# Patient Record
Sex: MALE | Race: BLACK OR AFRICAN AMERICAN
[De-identification: names, ages, dates, MRNs, and addresses within clinical notes are randomized per-mention and may not be internally consistent; named-entity substitution may affect disease eponyms.]

---

## 2019-07-13 ENCOUNTER — HOSPITAL ENCOUNTER (INPATIENT)
Dept: HOSPITAL 72 - EMR | Age: 59
LOS: 17 days | Discharge: NURSING, REHAB OR LONG-TERM CARE | DRG: 720 | End: 2019-07-30
Payer: MEDICAID

## 2019-07-13 VITALS — SYSTOLIC BLOOD PRESSURE: 122 MMHG | DIASTOLIC BLOOD PRESSURE: 70 MMHG

## 2019-07-13 VITALS — BODY MASS INDEX: 20.54 KG/M2 | HEIGHT: 73 IN | WEIGHT: 155.01 LBS

## 2019-07-13 DIAGNOSIS — E87.6: ICD-10-CM

## 2019-07-13 DIAGNOSIS — E11.622: ICD-10-CM

## 2019-07-13 DIAGNOSIS — Z59.0: ICD-10-CM

## 2019-07-13 DIAGNOSIS — F15.10: ICD-10-CM

## 2019-07-13 DIAGNOSIS — B19.20: ICD-10-CM

## 2019-07-13 DIAGNOSIS — E87.1: ICD-10-CM

## 2019-07-13 DIAGNOSIS — E88.09: ICD-10-CM

## 2019-07-13 DIAGNOSIS — A41.9: Primary | ICD-10-CM

## 2019-07-13 DIAGNOSIS — L97.829: ICD-10-CM

## 2019-07-13 DIAGNOSIS — L03.116: ICD-10-CM

## 2019-07-13 DIAGNOSIS — E11.610: ICD-10-CM

## 2019-07-13 DIAGNOSIS — D64.9: ICD-10-CM

## 2019-07-13 LAB
ADD MANUAL DIFF: YES
ALBUMIN SERPL-MCNC: 3 G/DL (ref 3.4–5)
ALBUMIN/GLOB SERPL: 0.7 {RATIO} (ref 1–2.7)
ALP SERPL-CCNC: 92 U/L (ref 46–116)
ALT SERPL-CCNC: 16 U/L (ref 12–78)
ANION GAP SERPL CALC-SCNC: 11 MMOL/L (ref 5–15)
APTT BLD: 36 SEC (ref 23–33)
AST SERPL-CCNC: 22 U/L (ref 15–37)
BILIRUB SERPL-MCNC: 0.9 MG/DL (ref 0.2–1)
BUN SERPL-MCNC: 17 MG/DL (ref 7–18)
CALCIUM SERPL-MCNC: 9.1 MG/DL (ref 8.5–10.1)
CHLORIDE SERPL-SCNC: 93 MMOL/L (ref 98–107)
CO2 SERPL-SCNC: 27 MMOL/L (ref 21–32)
CREAT SERPL-MCNC: 1 MG/DL (ref 0.55–1.3)
ERYTHROCYTE [DISTWIDTH] IN BLOOD BY AUTOMATED COUNT: 11.4 % (ref 11.6–14.8)
GLOBULIN SER-MCNC: 4.3 G/DL
HCT VFR BLD CALC: 36.6 % (ref 42–52)
HGB BLD-MCNC: 12.3 G/DL (ref 14.2–18)
INR PPP: 1 (ref 0.9–1.1)
MCV RBC AUTO: 90 FL (ref 80–99)
PLATELET # BLD: 191 K/UL (ref 150–450)
POTASSIUM SERPL-SCNC: 3.1 MMOL/L (ref 3.5–5.1)
RBC # BLD AUTO: 4.07 M/UL (ref 4.7–6.1)
SODIUM SERPL-SCNC: 131 MMOL/L (ref 136–145)
WBC # BLD AUTO: 18.8 K/UL (ref 4.8–10.8)

## 2019-07-13 PROCEDURE — 82728 ASSAY OF FERRITIN: CPT

## 2019-07-13 PROCEDURE — 82607 VITAMIN B-12: CPT

## 2019-07-13 PROCEDURE — 87070 CULTURE OTHR SPECIMN AEROBIC: CPT

## 2019-07-13 PROCEDURE — 85610 PROTHROMBIN TIME: CPT

## 2019-07-13 PROCEDURE — 80307 DRUG TEST PRSMV CHEM ANLYZR: CPT

## 2019-07-13 PROCEDURE — 82746 ASSAY OF FOLIC ACID SERUM: CPT

## 2019-07-13 PROCEDURE — 80202 ASSAY OF VANCOMYCIN: CPT

## 2019-07-13 PROCEDURE — 83880 ASSAY OF NATRIURETIC PEPTIDE: CPT

## 2019-07-13 PROCEDURE — 81001 URINALYSIS AUTO W/SCOPE: CPT

## 2019-07-13 PROCEDURE — 87081 CULTURE SCREEN ONLY: CPT

## 2019-07-13 PROCEDURE — 83540 ASSAY OF IRON: CPT

## 2019-07-13 PROCEDURE — 96375 TX/PRO/DX INJ NEW DRUG ADDON: CPT

## 2019-07-13 PROCEDURE — 96361 HYDRATE IV INFUSION ADD-ON: CPT

## 2019-07-13 PROCEDURE — 86709 HEPATITIS A IGM ANTIBODY: CPT

## 2019-07-13 PROCEDURE — 84443 ASSAY THYROID STIM HORMONE: CPT

## 2019-07-13 PROCEDURE — 82140 ASSAY OF AMMONIA: CPT

## 2019-07-13 PROCEDURE — 86803 HEPATITIS C AB TEST: CPT

## 2019-07-13 PROCEDURE — 93005 ELECTROCARDIOGRAM TRACING: CPT

## 2019-07-13 PROCEDURE — 82962 GLUCOSE BLOOD TEST: CPT

## 2019-07-13 PROCEDURE — 86705 HEP B CORE ANTIBODY IGM: CPT

## 2019-07-13 PROCEDURE — 99285 EMERGENCY DEPT VISIT HI MDM: CPT

## 2019-07-13 PROCEDURE — 83690 ASSAY OF LIPASE: CPT

## 2019-07-13 PROCEDURE — 36415 COLL VENOUS BLD VENIPUNCTURE: CPT

## 2019-07-13 PROCEDURE — 85025 COMPLETE CBC W/AUTO DIFF WBC: CPT

## 2019-07-13 PROCEDURE — 83550 IRON BINDING TEST: CPT

## 2019-07-13 PROCEDURE — 86850 RBC ANTIBODY SCREEN: CPT

## 2019-07-13 PROCEDURE — 93306 TTE W/DOPPLER COMPLETE: CPT

## 2019-07-13 PROCEDURE — 83735 ASSAY OF MAGNESIUM: CPT

## 2019-07-13 PROCEDURE — 80061 LIPID PANEL: CPT

## 2019-07-13 PROCEDURE — 87040 BLOOD CULTURE FOR BACTERIA: CPT

## 2019-07-13 PROCEDURE — 80048 BASIC METABOLIC PNL TOTAL CA: CPT

## 2019-07-13 PROCEDURE — 83036 HEMOGLOBIN GLYCOSYLATED A1C: CPT

## 2019-07-13 PROCEDURE — 86703 HIV-1/HIV-2 1 RESULT ANTBDY: CPT

## 2019-07-13 PROCEDURE — 85730 THROMBOPLASTIN TIME PARTIAL: CPT

## 2019-07-13 PROCEDURE — 87205 SMEAR GRAM STAIN: CPT

## 2019-07-13 PROCEDURE — 94664 DEMO&/EVAL PT USE INHALER: CPT

## 2019-07-13 PROCEDURE — 82977 ASSAY OF GGT: CPT

## 2019-07-13 PROCEDURE — 96366 THER/PROPH/DIAG IV INF ADDON: CPT

## 2019-07-13 PROCEDURE — 86140 C-REACTIVE PROTEIN: CPT

## 2019-07-13 PROCEDURE — 86901 BLOOD TYPING SEROLOGIC RH(D): CPT

## 2019-07-13 PROCEDURE — 93970 EXTREMITY STUDY: CPT

## 2019-07-13 PROCEDURE — 84550 ASSAY OF BLOOD/URIC ACID: CPT

## 2019-07-13 PROCEDURE — 83605 ASSAY OF LACTIC ACID: CPT

## 2019-07-13 PROCEDURE — 80053 COMPREHEN METABOLIC PANEL: CPT

## 2019-07-13 PROCEDURE — 87340 HEPATITIS B SURFACE AG IA: CPT

## 2019-07-13 PROCEDURE — 84100 ASSAY OF PHOSPHORUS: CPT

## 2019-07-13 PROCEDURE — 85651 RBC SED RATE NONAUTOMATED: CPT

## 2019-07-13 PROCEDURE — 85007 BL SMEAR W/DIFF WBC COUNT: CPT

## 2019-07-13 PROCEDURE — 86900 BLOOD TYPING SEROLOGIC ABO: CPT

## 2019-07-13 PROCEDURE — 80076 HEPATIC FUNCTION PANEL: CPT

## 2019-07-13 PROCEDURE — 87181 SC STD AGAR DILUTION PER AGT: CPT

## 2019-07-13 PROCEDURE — 96367 TX/PROPH/DG ADDL SEQ IV INF: CPT

## 2019-07-13 PROCEDURE — 96365 THER/PROPH/DIAG IV INF INIT: CPT

## 2019-07-13 SDOH — ECONOMIC STABILITY - HOUSING INSECURITY: HOMELESSNESS: Z59.0

## 2019-07-13 NOTE — NUR
ED Nurse Note:

Patient presents with complaints of left foot pain and swelling. Edema of the 
left foot apparent, patient reports feeling like he had recent fever. States 
leg/foot has been swollen for a month. Patient was BIBA from the streets, has 
no place to stay.
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
## 2019-07-14 VITALS — SYSTOLIC BLOOD PRESSURE: 107 MMHG | DIASTOLIC BLOOD PRESSURE: 70 MMHG

## 2019-07-14 VITALS — DIASTOLIC BLOOD PRESSURE: 76 MMHG | SYSTOLIC BLOOD PRESSURE: 129 MMHG

## 2019-07-14 VITALS — DIASTOLIC BLOOD PRESSURE: 73 MMHG | SYSTOLIC BLOOD PRESSURE: 126 MMHG

## 2019-07-14 VITALS — SYSTOLIC BLOOD PRESSURE: 102 MMHG | DIASTOLIC BLOOD PRESSURE: 65 MMHG

## 2019-07-14 VITALS — SYSTOLIC BLOOD PRESSURE: 101 MMHG | DIASTOLIC BLOOD PRESSURE: 69 MMHG

## 2019-07-14 VITALS — DIASTOLIC BLOOD PRESSURE: 70 MMHG | SYSTOLIC BLOOD PRESSURE: 109 MMHG

## 2019-07-14 LAB
APPEARANCE UR: CLEAR
APTT PPP: YELLOW S
GLUCOSE UR STRIP-MCNC: NEGATIVE MG/DL
KETONES UR QL STRIP: NEGATIVE
LEUKOCYTE ESTERASE UR QL STRIP: NEGATIVE
NITRITE UR QL STRIP: NEGATIVE
PH UR STRIP: 7 [PH] (ref 4.5–8)
PROT UR QL STRIP: (no result)
SP GR UR STRIP: 1.01 (ref 1–1.03)
UROBILINOGEN UR-MCNC: 12 MG/DL (ref 0–1)

## 2019-07-14 RX ADMIN — MORPHINE SULFATE PRN MG: 2 INJECTION, SOLUTION INTRAMUSCULAR; INTRAVENOUS at 09:38

## 2019-07-14 RX ADMIN — MORPHINE SULFATE PRN MG: 2 INJECTION, SOLUTION INTRAMUSCULAR; INTRAVENOUS at 13:46

## 2019-07-14 RX ADMIN — MORPHINE SULFATE PRN MG: 2 INJECTION, SOLUTION INTRAMUSCULAR; INTRAVENOUS at 17:37

## 2019-07-14 RX ADMIN — SODIUM CHLORIDE SCH MLS/HR: 0.9 INJECTION INTRAVENOUS at 09:39

## 2019-07-14 RX ADMIN — SODIUM CHLORIDE SCH MLS/HR: 9 INJECTION, SOLUTION INTRAVENOUS at 20:48

## 2019-07-14 RX ADMIN — HEPARIN SODIUM SCH UNITS: 5000 INJECTION INTRAVENOUS; SUBCUTANEOUS at 09:39

## 2019-07-14 RX ADMIN — HEPARIN SODIUM SCH UNITS: 5000 INJECTION INTRAVENOUS; SUBCUTANEOUS at 20:57

## 2019-07-14 RX ADMIN — DOCUSATE SODIUM SCH MG: 100 CAPSULE, LIQUID FILLED ORAL at 17:36

## 2019-07-14 RX ADMIN — MORPHINE SULFATE PRN MG: 2 INJECTION, SOLUTION INTRAMUSCULAR; INTRAVENOUS at 22:11

## 2019-07-14 NOTE — NUR
NURSE NOTES:

RECEIVED PATIENT FROM ER VIA GURNEY. ADM DX CELLULITES OF L FOOT, UNDER PRIMARY DR JIANG. 
A/O X3, ON RA, NO SOB, NO ACUTE DISTRESS. BELONGINGS CHECKED. ORIENTED PATIENT TO 
SURROUNDINGS. RAC SALINE LOCK IV INTACT, PATENT. BED IN LOWEST POSITION, LOCKED, ALARMS ON. 
CALL LIGHT IN REACH. LEFT MSG TO DR JIANG AND DR ARAMBULA FOR ADM ORDER.

## 2019-07-14 NOTE — DIAGNOSTIC IMAGING REPORT
Indication: Foot pain

 

Comparison: None

 

Findings: 3  views of the left foot were obtained. 

 

There is no acute fracture identified. There is truncation of the distal phalangeal

tuft of the hallux which may be postsurgical defect. There is generalized soft tissue

swelling present. There is loss of the plantar arch. There is slight diastases of the

talonavicular joint. There is arthrosis of the first MTP joint.

 

IMPRESSION:

 

No acute injury identified. Soft tissue swelling.

 

Multiple incidental findings as described

## 2019-07-14 NOTE — NUR
NURSE NOTES:

Dr. Weston evaluated pt, notified MD regarding WBC 18.8 and Dr. Coe ordered Cefepime 
and Vanco IV. Dr. Weston ordered MRI of foot, ankle, and leg, WCx and Wound care. Orders 
entered and carried out.



WC: NS and Xeroform



RN performed WCx and WC according to order.

## 2019-07-14 NOTE — NUR
NURSE NOTES:

Received report from IMER Busby. Pt in bed, awake, talkative, no admission orders for pt, 
physicians have been called multiple times for orders, RN will contact to follow up. Pt 
states has pain in left leg, discussed plan of care for pt and need for admission orders. Pt 
is not in respiratory distress, bed in lowest position, call light within reach.

## 2019-07-14 NOTE — HISTORY AND PHYSICAL REPORT
DATE OF ADMISSION:  07/13/2019

TIME SEEN:  At 9 a.m.



CONSULTANTS:

1. Pia Coe M.D.

2. Magdiel Randolph D.P.M.

3. Peterson King M.D.



CHIEF COMPLAINT:  Left foot cellulitis, sepsis, leukocytosis.



BRIEF HISTORY:  This is a 59-year-old male, who lives at home, presents

with three days of increased left foot cellulitis and he had a wound on

the dorsum of his left foot about three days ago and it got worse and

redness, swelling occurred.  The patient came to [place], diagnosed with

the above, and leukocytosis of 18, admitted to medical floor for further

treatment.  Currently, calm in bed, slight general pain.  No complaint

otherwise.



REVIEW OF SYSTEMS:  No chest pain.  No shortness of breath.  No nausea,

vomiting, or diarrhea.



PAST MEDICAL HISTORY:  Nothing.



PAST SURGICAL HISTORY:  Left hip.



MEDICATIONS:  Include vancomycin, cefepime, IV fluid, nitroglycerin,

temazepam, Zofran, morphine, albuterol, Tylenol, and clindamycin.



ALLERGIES:  Denies.



SOCIAL HISTORY:  Positive smoking.  Positive alcohol.  No intravenous drug

abuse.



FAMILY HISTORY:  Noncontributory.



PHYSICAL EXAMINATION:

GENERAL:  Calm in bed, oriented x3, no acute distress.

VITAL SIGNS:  Temperature is 99, pulse 95, respirations 18, blood pressure

102/65.

CARDIOVASCULAR:  No murmur.

LUNGS:  Distant and clear.

ABDOMEN:  Bowel sounds positive.  Soft, nontender, nondistended.

EXTREMITIES:  No cyanosis, clubbing.  A 1+ edema left foot dorsum, slight

_____ calf swelling up to mid shin, redness noted, slightly warm.

NEUROLOGIC:  The patient moves all extremities, slightly weak.



LABORATORY AND DIAGNOSTIC DATA:  White count 18, hemoglobin and hematocrit

12/36, platelets 191.  Sodium 131, potassium 3.1, chloride 93, glucose

128.  Lactic acid 2.9.  Albumin 3.0.  INR is 1.0, PTT is 36.



ASSESSMENT:  Left foot cellulitis, edema, leukocytosis, anemia,

malnutrition, hyponatremia, and hypokalemia.



PLAN:

1. Wound care.

2. Antibiotic per Infectious Disease.

3. Blood pressure and pain control.

4. Dietary followup.

5. Nephrology evaluation.

6. Podiatry evaluation.

7. CBC and BMP in the morning.









  ______________________________________________

  Freddy Muñiz D.O.





DR:  IDANIA

D:  07/14/2019 09:40

T:  07/15/2019 01:41

JOB#:  [id number]

CC:
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
## 2019-07-14 NOTE — NUR
NURSE NOTES:

Received report from IMER Joel. Patient alert, awake, and verbally responsive to let his 
needs known. Patient is breathing unlabored and evenly without signs of distress, 
discomfort, or SOB noted at this time. Patient verbalized mild pain on the left calf and 
food after the pain medication given on the previous shift at 1737. Patient was provided 
non-pharmacological measures such as rest, repositioning, elevating left leg, and 
distraction to alleviate the pain at this time. Patient's swollen left leg noted and left 
leg cellulitis noted. Patient's 20g IV on the RAC noted running fluid as ordered. Bed is 
placed at the lowest with brakes on and side rails up x 2 for safety measures. Call light 
placed within reach. Will continue to monitor for pain management and provide care as 
ordered.

## 2019-07-14 NOTE — NUR
NURSE NOTES:

Notified Dr. Carolin Munoz 131, K3.1, Cl 93, CRP 61.1, Lactic acid 2.50. asked about replacing 
K
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
## 2019-07-15 VITALS — DIASTOLIC BLOOD PRESSURE: 72 MMHG | SYSTOLIC BLOOD PRESSURE: 109 MMHG

## 2019-07-15 VITALS — DIASTOLIC BLOOD PRESSURE: 75 MMHG | SYSTOLIC BLOOD PRESSURE: 112 MMHG

## 2019-07-15 VITALS — SYSTOLIC BLOOD PRESSURE: 117 MMHG | DIASTOLIC BLOOD PRESSURE: 71 MMHG

## 2019-07-15 VITALS — SYSTOLIC BLOOD PRESSURE: 104 MMHG | DIASTOLIC BLOOD PRESSURE: 67 MMHG

## 2019-07-15 VITALS — SYSTOLIC BLOOD PRESSURE: 112 MMHG | DIASTOLIC BLOOD PRESSURE: 74 MMHG

## 2019-07-15 VITALS — SYSTOLIC BLOOD PRESSURE: 130 MMHG | DIASTOLIC BLOOD PRESSURE: 77 MMHG

## 2019-07-15 LAB
% IRON SATURATION: 19 % (ref 15–50)
ADD MANUAL DIFF: NO
ALBUMIN SERPL-MCNC: 2.2 G/DL (ref 3.4–5)
ALBUMIN/GLOB SERPL: 0.6 {RATIO} (ref 1–2.7)
ALP SERPL-CCNC: 80 U/L (ref 46–116)
ALT SERPL-CCNC: 15 U/L (ref 12–78)
AMMONIA PLAS-SCNC: 39 UMOL/L (ref 11–32)
ANION GAP SERPL CALC-SCNC: 7 MMOL/L (ref 5–15)
AST SERPL-CCNC: 23 U/L (ref 15–37)
BASOPHILS NFR BLD AUTO: 0.5 % (ref 0–2)
BILIRUB SERPL-MCNC: 0.7 MG/DL (ref 0.2–1)
BUN SERPL-MCNC: 9 MG/DL (ref 7–18)
CALCIUM SERPL-MCNC: 8.7 MG/DL (ref 8.5–10.1)
CHLORIDE SERPL-SCNC: 101 MMOL/L (ref 98–107)
CHOLEST SERPL-MCNC: 78 MG/DL (ref ?–200)
CO2 SERPL-SCNC: 28 MMOL/L (ref 21–32)
CREAT SERPL-MCNC: 0.9 MG/DL (ref 0.55–1.3)
EOSINOPHIL NFR BLD AUTO: 0.1 % (ref 0–3)
ERYTHROCYTE [DISTWIDTH] IN BLOOD BY AUTOMATED COUNT: 12.2 % (ref 11.6–14.8)
FERRITIN SERPL-MCNC: 324 NG/ML (ref 8–388)
GLOBULIN SER-MCNC: 4 G/DL
HCT VFR BLD CALC: 35.5 % (ref 42–52)
HDLC SERPL-MCNC: 12 MG/DL (ref 40–60)
HGB BLD-MCNC: 11.6 G/DL (ref 14.2–18)
IRON SERPL-MCNC: 25 UG/DL (ref 50–175)
LYMPHOCYTES NFR BLD AUTO: 12.5 % (ref 20–45)
MCV RBC AUTO: 93 FL (ref 80–99)
MONOCYTES NFR BLD AUTO: 14.3 % (ref 1–10)
NEUTROPHILS NFR BLD AUTO: 72.7 % (ref 45–75)
PHOSPHATE SERPL-MCNC: 3 MG/DL (ref 2.5–4.9)
PLATELET # BLD: 204 K/UL (ref 150–450)
POTASSIUM SERPL-SCNC: 3.6 MMOL/L (ref 3.5–5.1)
RBC # BLD AUTO: 3.84 M/UL (ref 4.7–6.1)
SODIUM SERPL-SCNC: 136 MMOL/L (ref 136–145)
TIBC SERPL-MCNC: 135 UG/DL (ref 250–450)
TRIGL SERPL-MCNC: 80 MG/DL (ref 30–150)
UNSATURATED IRON BINDING: 110 UG/DL (ref 112–346)
WBC # BLD AUTO: 12.6 K/UL (ref 4.8–10.8)

## 2019-07-15 RX ADMIN — HEPARIN SODIUM SCH UNITS: 5000 INJECTION INTRAVENOUS; SUBCUTANEOUS at 19:57

## 2019-07-15 RX ADMIN — DOCUSATE SODIUM SCH MG: 100 CAPSULE, LIQUID FILLED ORAL at 12:09

## 2019-07-15 RX ADMIN — DOCUSATE SODIUM SCH MG: 100 CAPSULE, LIQUID FILLED ORAL at 17:15

## 2019-07-15 RX ADMIN — HEPARIN SODIUM SCH UNITS: 5000 INJECTION INTRAVENOUS; SUBCUTANEOUS at 08:30

## 2019-07-15 RX ADMIN — MORPHINE SULFATE PRN MG: 2 INJECTION, SOLUTION INTRAMUSCULAR; INTRAVENOUS at 12:09

## 2019-07-15 RX ADMIN — SODIUM CHLORIDE SCH MLS/HR: 0.9 INJECTION INTRAVENOUS at 09:42

## 2019-07-15 RX ADMIN — MORPHINE SULFATE PRN MG: 2 INJECTION, SOLUTION INTRAMUSCULAR; INTRAVENOUS at 06:33

## 2019-07-15 RX ADMIN — DOCUSATE SODIUM SCH MG: 100 CAPSULE, LIQUID FILLED ORAL at 08:29

## 2019-07-15 RX ADMIN — MORPHINE SULFATE PRN MG: 2 INJECTION, SOLUTION INTRAMUSCULAR; INTRAVENOUS at 16:07

## 2019-07-15 RX ADMIN — SODIUM CHLORIDE SCH MLS/HR: 9 INJECTION, SOLUTION INTRAVENOUS at 19:56

## 2019-07-15 RX ADMIN — MORPHINE SULFATE PRN MG: 2 INJECTION, SOLUTION INTRAMUSCULAR; INTRAVENOUS at 02:16

## 2019-07-15 RX ADMIN — MORPHINE SULFATE PRN MG: 2 INJECTION, SOLUTION INTRAMUSCULAR; INTRAVENOUS at 20:13

## 2019-07-15 NOTE — NUR
Social Service Note



SW met with patient to assess for homelessness.  Patient is alert, oriented and verbally 
responsive.  Patient states he has been homeless for about two years.  Patient states he is 
able to live on the side of a friend's apartment located at [address] [place] [address] as long as he 
keeps it clean and he is quite at night.  Patient states he receives SSI of almost a $1,000 
a month.  Patient states he has poor money management and usually owe people money by the 
following month which makes housing options difficult.  Patient states he is out of money 
for the month.  Patient admits to substance abuse use, opiates and meth.  Patient states he 
only smokes meth a few times a month when he is cold and in pain.  Patient states he had a 
prescription for pain medication.   Patient last since at his clinic [facility], [address] [place] [address], [id number] [place] [id number], [phone] October 2018.  Pending MRI to rule 
out Osteo.  If patient requires IV antibiotics he will require SNF placement.  Patient is 
open to sober/independent living however states he can't commit to paying each month.  
Patient denies mental health disorder.  Patient provided his brother Kobi Terrazas 
[phone] as an emergency contact.  Will continue to monitor and assess patient needs 
upon discharge.

## 2019-07-15 NOTE — NUR
NURSE NOTES:

Pt resting in bed. Denies pain, no SOB. Dressing LLE , swelling, redness, elevated on 
pillow. call light within  reach, bed in low position, fall  precaution maintained. Will 
continue to monitor.

## 2019-07-15 NOTE — NUR
NURSE NOTES:

Patient had mild fever of 100.6 at midnight vitals. Tylenol was given at 0052 and cooling 
measures been applied such as light blanket, encouraging hydration, and ice packs. Patient's 
temperature at 0122 was 100.1. Patient's current temperature at 0258 is 98.6. Will continue 
to monitor and provide care as ordered.

## 2019-07-15 NOTE — INFECTIOUS DISEASES PROG NOTE
Assessment/Plan


Assessment/Plan





Assessment/Plan:


60 yo male who presnted to the ED on 7/12/19 with left leg cellulitis.





Left foot cellulitis


    Leukocytosis ;improving


    Low grade fevers


        -Foot xray: No acute injury identified. Soft tissue swelling. Multiple 

incidental findings as described











PLAN:





 - Continue Cefepime #2 and Vancomycin #2


 - f/u wound cx


 - Monitor CBC and Temps


- f/u MRI to R/O OM





Thank you for this consult. We will continue to follow the patient during this 

hospitalization.





Subjective


Allergies:  


Coded Allergies:  


     No Known Allergies (Unverified , 7/13/19)


Subjective


Tm 100.8


WBC improving


BCx NTD





Objective


Vital Signs





Last 24 Hour Vital Signs








  Date Time  Temp Pulse Resp B/P (MAP) Pulse Ox O2 Delivery O2 Flow Rate FiO2


 


7/15/19 16:37 98.1       


 


7/15/19 16:00 98.5 76 19 112/74 (87) 96   


 


7/15/19 12:00 98.1 64 19 109/72 (84) 97   


 


7/15/19 09:00      Room Air  


 


7/15/19 08:00 98.1 93 19 104/67 (79) 98   


 


7/15/19 07:30  83 17  93 Room Air  21


 


7/15/19 04:00 99.4 79 19 112/75 (87) 97   


 


7/15/19 01:22 100.1       


 


7/15/19 00:00 100.6 96 19 130/77 (94) 96   


 


7/14/19 21:00      Room Air  


 


7/14/19 20:00 100.8 104 19 129/76 (93) 96   








Height (Feet):  6


Height (Inches):  1.00


Weight (Pounds):  98


Objective


Gen:  NAD


HEENT: NCAT, MMM, EOMI, PERRL, No Oral lesion, no scleral icterus 


NECK:  full range of motion, supple, no meningismus, No LAD, No JVD


LUNGS:  CTAB, No W/C, No Accessory muscle use


CARDS: RRR, S1, S2, No M/R/G, 


ABD: Soft, NT, ND, No R/G, + BS, No HSM, No Masses


: Deferred


Ext: C/C/E, Pulses 2+ B/L (DP, Rad): LLE swelling with erythema and pain, 

Ulceration present with purulence.


NEURO: A/O x 4, Strength and Sensation Grossly intact


PSYCH: Normal mood and affect


SKIN:  Warm/dry, No rashes





Microbiology








 Date/Time


Source Procedure


Growth Status


 


 


 7/13/19 20:27


Blood Blood Culture - Preliminary


NO GROWTH AFTER 24 HOURS Resulted


 


 7/13/19 20:19


Blood Blood Culture - Preliminary


NO GROWTH AFTER 24 HOURS Resulted





 7/14/19 11:30


Leg Left Gram Stain - Final Resulted


 


 7/14/19 11:30


Leg Left Wound Culture


Pending Resulted


 


 7/13/19 20:25


Rectum  Received











Laboratory Tests








Test


  7/15/19


05:40


 


White Blood Count


  12.6 K/UL


(4.8-10.8)  H


 


Red Blood Count


  3.84 M/UL


(4.70-6.10)  L


 


Hemoglobin


  11.6 G/DL


(14.2-18.0)  L


 


Hematocrit


  35.5 %


(42.0-52.0)  L


 


Mean Corpuscular Volume 93 FL (80-99)  


 


Mean Corpuscular Hemoglobin


  30.3 PG


(27.0-31.0)


 


Mean Corpuscular Hemoglobin


Concent 32.7 G/DL


(32.0-36.0)


 


Red Cell Distribution Width


  12.2 %


(11.6-14.8)


 


Platelet Count


  204 K/UL


(150-450)


 


Mean Platelet Volume


  5.6 FL


(6.5-10.1)  L


 


Neutrophils (%) (Auto)


  72.7 %


(45.0-75.0)


 


Lymphocytes (%) (Auto)


  12.5 %


(20.0-45.0)  L


 


Monocytes (%) (Auto)


  14.3 %


(1.0-10.0)  H


 


Eosinophils (%) (Auto)


  0.1 %


(0.0-3.0)


 


Basophils (%) (Auto)


  0.5 %


(0.0-2.0)


 


Sodium Level


  136 MMOL/L


(136-145)


 


Potassium Level


  3.6 MMOL/L


(3.5-5.1)


 


Chloride Level


  101 MMOL/L


()


 


Carbon Dioxide Level


  28 MMOL/L


(21-32)


 


Anion Gap


  7 mmol/L


(5-15)


 


Blood Urea Nitrogen


  9 mg/dL (7-18)


 


 


Creatinine


  0.9 MG/DL


(0.55-1.30)


 


Estimat Glomerular Filtration


Rate > 60 mL/min


(>60)


 


Glucose Level


  95 MG/DL


()


 


Hemoglobin A1c


  6.4 %


(4.3-6.0)  H


 


Uric Acid


  3.5 MG/DL


(2.6-7.2)


 


Calcium Level


  8.7 MG/DL


(8.5-10.1)


 


Phosphorus Level


  3.0 MG/DL


(2.5-4.9)


 


Magnesium Level


  2.0 MG/DL


(1.8-2.4)


 


Iron Level


  25 ug/dL


()  L


 


Total Iron Binding Capacity


  135 ug/dL


(250-450)  L


 


Percent Iron Saturation 19 % (15-50)  


 


Unsaturated Iron Binding


  110 ug/dL


(112-346)  L


 


Ferritin


  324 NG/ML


(8-388)


 


Total Bilirubin


  0.7 MG/DL


(0.2-1.0)


 


Aspartate Amino Transf


(AST/SGOT) 23 U/L (15-37)


 


 


Alanine Aminotransferase


(ALT/SGPT) 15 U/L (12-78)


 


 


Alkaline Phosphatase


  80 U/L


()


 


Ammonia


  39 umol/L


(11-32)  H


 


C-Reactive Protein,


Quantitative 19.3 mg/dL


(0.00-0.90)  H


 


Pro-B-Type Natriuretic Peptide


  773 pg/mL


(0-125)  H


 


Total Protein


  6.2 G/DL


(6.4-8.2)  L


 


Albumin


  2.2 G/DL


(3.4-5.0)  L


 


Globulin 4.0 g/dL  


 


Albumin/Globulin Ratio


  0.6 (1.0-2.7)


L


 


Triglycerides Level


  80 MG/DL


()


 


Cholesterol Level


  78 MG/DL (<


200)


 


LDL Cholesterol


  47 mg/dL


(<100)


 


HDL Cholesterol


  12 MG/DL


(40-60)  L


 


Cholesterol/HDL Ratio


  6.5 (3.3-4.4)


H


 


Vitamin B12 Level


  427 PG/ML


(193-986)


 


Folate


  8.4 NG/ML


(8.6-58.9)  L


 


Thyroid Stimulating Hormone


(TSH) 0.516 uiU/mL


(0.358-3.740)











Current Medications








 Medications


  (Trade)  Dose


 Ordered  Sig/Herlinda


 Route


 PRN Reason  Start Time


 Stop Time Status Last Admin


Dose Admin


 


 Acetaminophen


  (Tylenol)  650 mg  Q4H  PRN


 ORAL


 fever  7/14/19 08:15


 8/13/19 08:14  7/15/19 00:52


 


 


 Albuterol/


 Ipratropium


  (Albuterol/


 Ipratropium)  3 ml  Q4H  PRN


 HHN


 Shortness of Breath  7/14/19 08:15


 7/19/19 08:14   


 


 


 Cefepime HCl 1 gm/


 Dextrose  55 ml @ 


 110 mls/hr  EVERY 12  HOURS


 IVPB


   7/16/19 09:00


 7/21/19 10:00   


 


 


 Dextrose


  (Dextrose 50%)  25 ml  Q30M  PRN


 IV


 Hypoglycemia  7/14/19 08:30


 8/13/19 08:16   


 


 


 Dextrose


  (Dextrose 50%)  50 ml  Q30M  PRN


 IV


 hypoglycemia  7/14/19 08:30


 8/13/19 08:29   


 


 


 Dextrose/


 Electrolytes  1,000 ml @ 


 50 mls/hr  Q20H


 IV


   7/15/19 10:00


 8/13/19 09:59  7/15/19 09:39


 


 


 Docusate Sodium


  (Colace)  100 mg  THREE TIMES A  DAY


 ORAL


   7/14/19 18:00


 8/13/19 17:59  7/15/19 12:09


 


 


 Folic Acid


  (Folate)  3 mg  DAILY


 ORAL


   7/15/19 09:15


 8/14/19 09:14  7/15/19 09:41


 


 


 Heparin Sodium


  (Porcine)


  (Heparin 5000


 units/ml)  5,000 units  EVERY 12  HOURS


 SUBQ


   7/14/19 09:00


 8/13/19 08:59  7/15/19 08:30


 


 


 Morphine Sulfate


  (Morphine


 Sulfate)  2 mg  Q4H  PRN


 IVP


 Moderate Pain (Pain Scale 4-6)  7/14/19 08:15


 7/21/19 08:14  7/15/19 16:07


 


 


 Nitroglycerin


  (Ntg)  0.4 mg  Q5M  PRN


 SL


 Prn Chest Pain  7/14/19 08:15


 8/13/19 08:14   


 


 


 Ondansetron HCl


  (Zofran)  4 mg  Q6H  PRN


 IVP


 Nausea & Vomiting  7/14/19 08:15


 8/13/19 08:14   


 


 


 Pantoprazole


  (Protonix)  40 mg  EVERY 12  HOURS


 ORAL


   7/14/19 21:00


 8/13/19 20:59  7/15/19 08:29


 


 


 Polyethylene


 Glycol


  (Miralax)  17 gm  DAILYPRN  PRN


 ORAL


 Constipation  7/14/19 08:15


 8/13/19 08:14  7/14/19 11:32


 


 


 Temazepam


  (Restoril)  15 mg  HSPRN  PRN


 ORAL


 Insomnia  7/14/19 08:15


 7/21/19 08:14   


 


 


 Vancomycin HCl


  (Vanco rx to


 dose)  1 ea  DAILY  PRN


 MISC


 .  7/14/19 08:30


 8/13/19 08:29   


 


 


 Vancomycin HCl 1


 gm/Dextrose  275 ml @ 


 183.3 mls/


 hr  Q24H


 IVPB


   7/14/19 20:00


 7/19/19 19:59  7/14/19 20:48


 

















Sarah Sinclair M.D. Jul 15, 2019 18:30
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
## 2019-07-15 NOTE — CONSULTATION
History of Present Illness


General


Date patient seen:  Jul 15, 2019


Chief Complaint:  Skin Rash/Abscess


Reason for Consultation:  cellulitis





Present Illness


HPI


59 year old male, homeless, no major medical problems presented to [place] [facility] 

with selling and redness of his left foot.  he was diagnosed to have cellulitis 

and sepsis and admitted for further management.


Allergies:  


Coded Allergies:  


     No Known Allergies (Unverified , 7/13/19)





Patient History


Healthcare decision maker





Resuscitation status


Full Code


Advanced Directive on File








Review of Systems


All Other Systems:  negative except mentioned in HPI





Physical Exam


General Appearance:  no apparent distress, alert


Lines, tubes and drains:  peripheral


HEENT:  normocephalic, atraumatic


Neck:  non-tender, normal alignment


Respiratory/Chest:  chest wall non-tender


Cardiovascular/Chest:  normal peripheral pulses, normal rate


Abdomen:  normal bowel sounds


Genitourinary/Rectal:  normal genital exam


Skin Exam:  rash





Last 24 Hour Vital Signs








  Date Time  Temp Pulse Resp B/P (MAP) Pulse Ox O2 Delivery O2 Flow Rate FiO2


 


7/15/19 12:00 98.1 64 19 109/72 (84) 97   


 


7/15/19 09:00      Room Air  


 


7/15/19 08:00 98.1 93 19 104/67 (79) 98   


 


7/15/19 07:30  83 17  93 Room Air  21


 


7/15/19 04:00 99.4 79 19 112/75 (87) 97   


 


7/15/19 01:22 100.1       


 


7/15/19 00:00 100.6 96 19 130/77 (94) 96   


 


7/14/19 21:00      Room Air  


 


7/14/19 20:00 100.8 104 19 129/76 (93) 96   


 


7/14/19 16:00 98.6  17 109/70 (83) 96   


 


7/14/19 14:16 98.2       

















Intake and Output  


 


 7/14/19 7/15/19





 18:59 06:59


 


Intake Total 1630 ml 2395.0 ml


 


Output Total 1200 ml 1300 ml


 


Balance 430 ml 1095.0 ml


 


  


 


Intake Oral 1520 ml 1520 ml


 


IV Total 110 ml 875.0 ml


 


Output Urine Total 1200 ml 1300 ml


 


# Voids 5 2











Laboratory Tests








Test


  7/14/19


15:00 7/15/19


05:40


 


Urine Color Yellow   


 


Urine Appearance Clear   


 


Urine pH 7 (4.5-8.0)   


 


Urine Specific Gravity


  1.015


(1.005-1.035) 


 


 


Urine Protein


  2+ (NEGATIVE)


H 


 


 


Urine Glucose (UA)


  Negative


(NEGATIVE) 


 


 


Urine Ketones


  Negative


(NEGATIVE) 


 


 


Urine Blood


  Negative


(NEGATIVE) 


 


 


Urine Nitrite


  Negative


(NEGATIVE) 


 


 


Urine Bilirubin


  Negative


(NEGATIVE) 


 


 


Urine Urobilinogen


  12 MG/DL


(0.0-1.0)  H 


 


 


Urine Leukocyte Esterase


  Negative


(NEGATIVE) 


 


 


Urine RBC


  0-2 /HPF (0 -


0)  H 


 


 


Urine WBC


  0-2 /HPF (0 -


0) 


 


 


Urine Squamous Epithelial


Cells None /LPF


(NONE/OCC) 


 


 


Urine Bacteria


  Occasional


/HPF (NONE) 


 


 


Urine Opiates Screen


  Positive


(NEGATIVE)  H 


 


 


Urine Barbiturates Screen


  Negative


(NEGATIVE) 


 


 


Phencyclidine (PCP) Screen


  Negative


(NEGATIVE) 


 


 


Urine Amphetamines Screen


  Positive


(NEGATIVE)  H 


 


 


Urine Benzodiazepines Screen


  Negative


(NEGATIVE) 


 


 


Urine Cocaine Screen


  Negative


(NEGATIVE) 


 


 


Urine Marijuana (THC) Screen


  Negative


(NEGATIVE) 


 


 


White Blood Count


  


  12.6 K/UL


(4.8-10.8)  H


 


Red Blood Count


  


  3.84 M/UL


(4.70-6.10)  L


 


Hemoglobin


  


  11.6 G/DL


(14.2-18.0)  L


 


Hematocrit


  


  35.5 %


(42.0-52.0)  L


 


Mean Corpuscular Volume  93 FL (80-99)  


 


Mean Corpuscular Hemoglobin


  


  30.3 PG


(27.0-31.0)


 


Mean Corpuscular Hemoglobin


Concent 


  32.7 G/DL


(32.0-36.0)


 


Red Cell Distribution Width


  


  12.2 %


(11.6-14.8)


 


Platelet Count


  


  204 K/UL


(150-450)


 


Mean Platelet Volume


  


  5.6 FL


(6.5-10.1)  L


 


Neutrophils (%) (Auto)


  


  72.7 %


(45.0-75.0)


 


Lymphocytes (%) (Auto)


  


  12.5 %


(20.0-45.0)  L


 


Monocytes (%) (Auto)


  


  14.3 %


(1.0-10.0)  H


 


Eosinophils (%) (Auto)


  


  0.1 %


(0.0-3.0)


 


Basophils (%) (Auto)


  


  0.5 %


(0.0-2.0)


 


Sodium Level


  


  136 MMOL/L


(136-145)


 


Potassium Level


  


  3.6 MMOL/L


(3.5-5.1)


 


Chloride Level


  


  101 MMOL/L


()


 


Carbon Dioxide Level


  


  28 MMOL/L


(21-32)


 


Anion Gap


  


  7 mmol/L


(5-15)


 


Blood Urea Nitrogen


  


  9 mg/dL (7-18)


 


 


Creatinine


  


  0.9 MG/DL


(0.55-1.30)


 


Estimat Glomerular Filtration


Rate 


  > 60 mL/min


(>60)


 


Glucose Level


  


  95 MG/DL


()


 


Hemoglobin A1c


  


  6.4 %


(4.3-6.0)  H


 


Uric Acid


  


  3.5 MG/DL


(2.6-7.2)


 


Calcium Level


  


  8.7 MG/DL


(8.5-10.1)


 


Phosphorus Level


  


  3.0 MG/DL


(2.5-4.9)


 


Magnesium Level


  


  2.0 MG/DL


(1.8-2.4)


 


Iron Level


  


  25 ug/dL


()  L


 


Total Iron Binding Capacity


  


  135 ug/dL


(250-450)  L


 


Percent Iron Saturation  19 % (15-50)  


 


Unsaturated Iron Binding


  


  110 ug/dL


(112-346)  L


 


Ferritin


  


  324 NG/ML


(8-388)


 


Total Bilirubin


  


  0.7 MG/DL


(0.2-1.0)


 


Aspartate Amino Transf


(AST/SGOT) 


  23 U/L (15-37)


 


 


Alanine Aminotransferase


(ALT/SGPT) 


  15 U/L (12-78)


 


 


Alkaline Phosphatase


  


  80 U/L


()


 


Ammonia


  


  39 umol/L


(11-32)  H


 


C-Reactive Protein,


Quantitative 


  19.3 mg/dL


(0.00-0.90)  H


 


Pro-B-Type Natriuretic Peptide


  


  773 pg/mL


(0-125)  H


 


Total Protein


  


  6.2 G/DL


(6.4-8.2)  L


 


Albumin


  


  2.2 G/DL


(3.4-5.0)  L


 


Globulin  4.0 g/dL  


 


Albumin/Globulin Ratio


  


  0.6 (1.0-2.7)


L


 


Triglycerides Level


  


  80 MG/DL


()


 


Cholesterol Level


  


  78 MG/DL (<


200)


 


LDL Cholesterol


  


  47 mg/dL


(<100)


 


HDL Cholesterol


  


  12 MG/DL


(40-60)  L


 


Cholesterol/HDL Ratio


  


  6.5 (3.3-4.4)


H


 


Vitamin B12 Level


  


  427 PG/ML


(193-986)


 


Folate


  


  8.4 NG/ML


(8.6-58.9)  L


 


Thyroid Stimulating Hormone


(TSH) 


  0.516 uiU/mL


(0.358-3.740)








Height (Feet):  6


Height (Inches):  1.00


Weight (Pounds):  98


Medications





Current Medications








 Medications


  (Trade)  Dose


 Ordered  Sig/Herlinda


 Route


 PRN Reason  Start Time


 Stop Time Status Last Admin


Dose Admin


 


 Acetaminophen


  (Tylenol)  650 mg  Q4H  PRN


 ORAL


 fever  7/14/19 08:15


 8/13/19 08:14  7/15/19 00:52


 


 


 Albuterol/


 Ipratropium


  (Albuterol/


 Ipratropium)  3 ml  Q4H  PRN


 HHN


 Shortness of Breath  7/14/19 08:15


 7/19/19 08:14   


 


 


 Cefepime HCl 2 gm/


 Dextrose  110 ml @ 


 220 mls/hr  Q24H


 IV


   7/14/19 10:00


 7/21/19 09:59  7/15/19 09:42


 


 


 Dextrose


  (Dextrose 50%)  25 ml  Q30M  PRN


 IV


 Hypoglycemia  7/14/19 08:30


 8/13/19 08:16   


 


 


 Dextrose


  (Dextrose 50%)  50 ml  Q30M  PRN


 IV


 hypoglycemia  7/14/19 08:30


 8/13/19 08:29   


 


 


 Dextrose/


 Electrolytes  1,000 ml @ 


 50 mls/hr  Q20H


 IV


   7/15/19 10:00


 8/13/19 09:59  7/15/19 09:39


 


 


 Docusate Sodium


  (Colace)  100 mg  THREE TIMES A  DAY


 ORAL


   7/14/19 18:00


 8/13/19 17:59  7/15/19 12:09


 


 


 Folic Acid


  (Folate)  3 mg  DAILY


 ORAL


   7/15/19 09:15


 8/14/19 09:14  7/15/19 09:41


 


 


 Heparin Sodium


  (Porcine)


  (Heparin 5000


 units/ml)  5,000 units  EVERY 12  HOURS


 SUBQ


   7/14/19 09:00


 8/13/19 08:59  7/15/19 08:30


 


 


 Morphine Sulfate


  (Morphine


 Sulfate)  2 mg  Q4H  PRN


 IVP


 Moderate Pain (Pain Scale 4-6)  7/14/19 08:15


 7/21/19 08:14  7/15/19 12:09


 


 


 Nitroglycerin


  (Ntg)  0.4 mg  Q5M  PRN


 SL


 Prn Chest Pain  7/14/19 08:15


 8/13/19 08:14   


 


 


 Ondansetron HCl


  (Zofran)  4 mg  Q6H  PRN


 IVP


 Nausea & Vomiting  7/14/19 08:15


 8/13/19 08:14   


 


 


 Pantoprazole


  (Protonix)  40 mg  EVERY 12  HOURS


 ORAL


   7/14/19 21:00


 8/13/19 20:59  7/15/19 08:29


 


 


 Polyethylene


 Glycol


  (Miralax)  17 gm  DAILYPRN  PRN


 ORAL


 Constipation  7/14/19 08:15


 8/13/19 08:14  7/14/19 11:32


 


 


 Temazepam


  (Restoril)  15 mg  HSPRN  PRN


 ORAL


 Insomnia  7/14/19 08:15


 7/21/19 08:14   


 


 


 Vancomycin HCl


  (Vanco rx to


 dose)  1 ea  DAILY  PRN


 MISC


 .  7/14/19 08:30


 8/13/19 08:29   


 


 


 Vancomycin HCl 1


 gm/Dextrose  275 ml @ 


 183.3 mls/


 hr  Q24H


 IVPB


   7/14/19 20:00


 7/19/19 19:59  7/14/19 20:48


 











Assessment/Plan


Problem List:  


(1) Sepsis


ICD Codes:  A41.9 - Sepsis, unspecified organism


SNOMED:  28602570


(2) Cellulitis of left foot


ICD Codes:  L03.116 - Cellulitis of left lower limb


SNOMED:  098504242


Assessment/Plan:


iv  abx


wound care


dvt prophylaxis


symptomatic treatment.











Pia Coe MD Jul 15, 2019 12:42

## 2019-07-15 NOTE — NEPHROLOGY PROGRESS NOTE
Assessment/Plan


Problem List:  


(1) Cellulitis of left foot


(2) Sepsis


(3) Hyponatremia


(4) Hypoalbuminemia


Assessment


Left foot cellulitis / Leukocytosis  / No fever


Low Na


Low K


Anemia


High Lactic


Low Albumin


Plan








recheck BMI


Isotonic solution


K supplement


monitor lytes


avoid nephrotoxics


urine tox screen positive for Amphetamines and Opiates


per orders





Objective


Objective





Last 24 Hour Vital Signs








  Date Time  Temp Pulse Resp B/P (MAP) Pulse Ox O2 Delivery O2 Flow Rate FiO2


 


7/15/19 12:00 98.1 64 19 109/72 (84) 97   


 


7/15/19 09:00      Room Air  


 


7/15/19 08:00 98.1 93 19 104/67 (79) 98   


 


7/15/19 07:30  83 17  93 Room Air  21


 


7/15/19 04:00 99.4 79 19 112/75 (87) 97   


 


7/15/19 01:22 100.1       


 


7/15/19 00:00 100.6 96 19 130/77 (94) 96   


 


7/14/19 21:00      Room Air  


 


7/14/19 20:00 100.8 104 19 129/76 (93) 96   


 


7/14/19 16:00 98.6  17 109/70 (83) 96   


 


7/14/19 14:16 98.2       

















Intake and Output  


 


 7/14/19 7/15/19





 18:59 06:59


 


Intake Total 1630 ml 2395.0 ml


 


Output Total 1200 ml 1300 ml


 


Balance 430 ml 1095.0 ml


 


  


 


Intake Oral 1520 ml 1520 ml


 


IV Total 110 ml 875.0 ml


 


Output Urine Total 1200 ml 1300 ml


 


# Voids 5 2








Laboratory Tests


7/14/19 15:00: 


Urine Color Yellow, Urine Appearance Clear, Urine pH 7, Urine Specific Gravity 

1.015, Urine Protein 2+H, Urine Glucose (UA) Negative, Urine Ketones Negative, 

Urine Blood Negative, Urine Nitrite Negative, Urine Bilirubin Negative, Urine 

Urobilinogen 12H, Urine Leukocyte Esterase Negative, Urine RBC 0-2H, Urine WBC 0

-2, Urine Squamous Epithelial Cells None, Urine Bacteria Occasional, Urine 

Opiates Screen PositiveH, Urine Barbiturates Screen Negative, Phencyclidine (PCP

) Screen Negative, Urine Amphetamines Screen PositiveH, Urine Benzodiazepines 

Screen Negative, Urine Cocaine Screen Negative, Urine Marijuana (THC) Screen 

Negative


7/15/19 05:40: 


White Blood Count 12.6H, Red Blood Count 3.84L, Hemoglobin 11.6L, Hematocrit 

35.5L, Mean Corpuscular Volume 93, Mean Corpuscular Hemoglobin 30.3, Mean 

Corpuscular Hemoglobin Concent 32.7, Red Cell Distribution Width 12.2, Platelet 

Count 204, Mean Platelet Volume 5.6L, Neutrophils (%) (Auto) 72.7, Lymphocytes (

%) (Auto) 12.5L, Monocytes (%) (Auto) 14.3H, Eosinophils (%) (Auto) 0.1, 

Basophils (%) (Auto) 0.5, Sodium Level 136, Potassium Level 3.6, Chloride Level 

101, Carbon Dioxide Level 28, Anion Gap 7, Blood Urea Nitrogen 9, Creatinine 0.9

, Estimat Glomerular Filtration Rate > 60, Glucose Level 95, Hemoglobin A1c 6.4H

, Uric Acid 3.5, Calcium Level 8.7, Phosphorus Level 3.0, Magnesium Level 2.0, 

Iron Level 25L, Total Iron Binding Capacity 135L, Percent Iron Saturation 19, 

Unsaturated Iron Binding 110L, Ferritin 324, Total Bilirubin 0.7, Aspartate 

Amino Transf (AST/SGOT) 23, Alanine Aminotransferase (ALT/SGPT) 15, Alkaline 

Phosphatase 80, Ammonia 39H, C-Reactive Protein, Quantitative 19.3H, Pro-B-Type 

Natriuretic Peptide 773H, Total Protein 6.2L, Albumin 2.2L, Globulin 4.0, 

Albumin/Globulin Ratio 0.6L, Triglycerides Level 80, Cholesterol Level 78, LDL 

Cholesterol 47, HDL Cholesterol 12L, Cholesterol/HDL Ratio 6.5H, Vitamin B12 

Level 427, Folate 8.4L, Thyroid Stimulating Hormone (TSH) 0.516


Height (Feet):  6


Height (Inches):  1.00


Weight (Pounds):  98











Manuel Norton MD Jul 15, 2019 12:44

## 2019-07-15 NOTE — GENERAL PROGRESS NOTE
Assessment/Plan


Problem List:  


(1) Sepsis


ICD Codes:  A41.9 - Sepsis, unspecified organism


SNOMED:  15686029


(2) Cellulitis of left foot


ICD Codes:  L03.116 - Cellulitis of left lower limb


SNOMED:  145639462


Status:  stable, progressing


Assessment/Plan:


wound care abx pain control cbc bmp am





Subjective


Constitutional:  Reports: weakness


Allergies:  


Coded Allergies:  


     No Known Allergies (Unverified , 7/13/19)


All Systems:  reviewed and negative except above


Subjective


sleepy calm





Objective





Last 24 Hour Vital Signs








  Date Time  Temp Pulse Resp B/P (MAP) Pulse Ox O2 Delivery O2 Flow Rate FiO2


 


7/15/19 09:00      Room Air  


 


7/15/19 08:00 98.1 93 19 104/67 (79) 98   


 


7/15/19 07:30  83 17  93 Room Air  21


 


7/15/19 04:00 99.4 79 19 112/75 (87) 97   


 


7/15/19 01:22 100.1       


 


7/15/19 00:00 100.6 96 19 130/77 (94) 96   


 


7/14/19 21:00      Room Air  


 


7/14/19 20:00 100.8 104 19 129/76 (93) 96   


 


7/14/19 16:00 98.6  17 109/70 (83) 96   


 


7/14/19 14:16 98.2       


 


7/14/19 12:00 98.2 93 26 101/69 (80) 98   

















Intake and Output  


 


 7/14/19 7/15/19





 19:00 07:00


 


Intake Total 1630 ml 2395.0 ml


 


Output Total 1200 ml 1300 ml


 


Balance 430 ml 1095.0 ml


 


  


 


Intake Oral 1520 ml 1520 ml


 


IV Total 110 ml 875.0 ml


 


Output Urine Total 1200 ml 1300 ml


 


# Voids 5 2








Laboratory Tests


7/14/19 15:00: 


Urine Color Yellow, Urine Appearance Clear, Urine pH 7, Urine Specific Gravity 

1.015, Urine Protein 2+H, Urine Glucose (UA) Negative, Urine Ketones Negative, 

Urine Blood Negative, Urine Nitrite Negative, Urine Bilirubin Negative, Urine 

Urobilinogen 12H, Urine Leukocyte Esterase Negative, Urine RBC 0-2H, Urine WBC 0

-2, Urine Squamous Epithelial Cells None, Urine Bacteria Occasional, Urine 

Opiates Screen PositiveH, Urine Barbiturates Screen Negative, Phencyclidine (PCP

) Screen Negative, Urine Amphetamines Screen PositiveH, Urine Benzodiazepines 

Screen Negative, Urine Cocaine Screen Negative, Urine Marijuana (THC) Screen 

Negative


7/15/19 05:40: 


White Blood Count 12.6H, Red Blood Count 3.84L, Hemoglobin 11.6L, Hematocrit 

35.5L, Mean Corpuscular Volume 93, Mean Corpuscular Hemoglobin 30.3, Mean 

Corpuscular Hemoglobin Concent 32.7, Red Cell Distribution Width 12.2, Platelet 

Count 204, Mean Platelet Volume 5.6L, Neutrophils (%) (Auto) 72.7, Lymphocytes (

%) (Auto) 12.5L, Monocytes (%) (Auto) 14.3H, Eosinophils (%) (Auto) 0.1, 

Basophils (%) (Auto) 0.5, Sodium Level 136, Potassium Level 3.6, Chloride Level 

101, Carbon Dioxide Level 28, Anion Gap 7, Blood Urea Nitrogen 9, Creatinine 0.9

, Estimat Glomerular Filtration Rate > 60, Glucose Level 95, Hemoglobin A1c 6.4H

, Uric Acid 3.5, Calcium Level 8.7, Phosphorus Level 3.0, Magnesium Level 2.0, 

Iron Level 25L, Total Iron Binding Capacity 135L, Percent Iron Saturation 19, 

Unsaturated Iron Binding 110L, Ferritin 324, Total Bilirubin 0.7, Aspartate 

Amino Transf (AST/SGOT) 23, Alanine Aminotransferase (ALT/SGPT) 15, Alkaline 

Phosphatase 80, Ammonia 39H, C-Reactive Protein, Quantitative 19.3H, Pro-B-Type 

Natriuretic Peptide 773H, Total Protein 6.2L, Albumin 2.2L, Globulin 4.0, 

Albumin/Globulin Ratio 0.6L, Triglycerides Level 80, Cholesterol Level 78, LDL 

Cholesterol 47, HDL Cholesterol 12L, Cholesterol/HDL Ratio 6.5H, Vitamin B12 

Level 427, Folate 8.4L, Thyroid Stimulating Hormone (TSH) 0.516


Height (Feet):  6


Height (Inches):  1.00


Weight (Pounds):  98


General Appearance:  lethargic


EENT:  normal ENT inspection


Neck:  normal alignment


Cardiovascular:  normal peripheral pulses, normal rate, regular rhythm


Respiratory/Chest:  chest wall non-tender, lungs clear, normal breath sounds


Abdomen:  normal bowel sounds, non tender, soft


Extremities:  normal inspection


Edema:  1+ Arm (L), 1+ Arm (R), 1+ Leg (L), 1+ Leg (R), 1+ Pedal (L), 1+ Pedal (

R), 1+ Generalized


Neurologic:  motor weakness


Skin:  normal pigmentation, warm/dry











Freddy Muñiz DO Jul 15, 2019 09:11

## 2019-07-16 VITALS — DIASTOLIC BLOOD PRESSURE: 82 MMHG | SYSTOLIC BLOOD PRESSURE: 129 MMHG

## 2019-07-16 VITALS — SYSTOLIC BLOOD PRESSURE: 129 MMHG | DIASTOLIC BLOOD PRESSURE: 75 MMHG

## 2019-07-16 VITALS — DIASTOLIC BLOOD PRESSURE: 93 MMHG | SYSTOLIC BLOOD PRESSURE: 146 MMHG

## 2019-07-16 VITALS — DIASTOLIC BLOOD PRESSURE: 82 MMHG | SYSTOLIC BLOOD PRESSURE: 141 MMHG

## 2019-07-16 VITALS — SYSTOLIC BLOOD PRESSURE: 137 MMHG | DIASTOLIC BLOOD PRESSURE: 84 MMHG

## 2019-07-16 VITALS — SYSTOLIC BLOOD PRESSURE: 123 MMHG | DIASTOLIC BLOOD PRESSURE: 77 MMHG

## 2019-07-16 LAB
ADD MANUAL DIFF: NO
ALBUMIN SERPL-MCNC: 2.2 G/DL (ref 3.4–5)
ALBUMIN/GLOB SERPL: 0.5 {RATIO} (ref 1–2.7)
ALP SERPL-CCNC: 88 U/L (ref 46–116)
ALT SERPL-CCNC: 20 U/L (ref 12–78)
ANION GAP SERPL CALC-SCNC: 7 MMOL/L (ref 5–15)
AST SERPL-CCNC: 30 U/L (ref 15–37)
BASOPHILS NFR BLD AUTO: 0.8 % (ref 0–2)
BILIRUB SERPL-MCNC: 0.5 MG/DL (ref 0.2–1)
BUN SERPL-MCNC: 8 MG/DL (ref 7–18)
CALCIUM SERPL-MCNC: 8.6 MG/DL (ref 8.5–10.1)
CHLORIDE SERPL-SCNC: 98 MMOL/L (ref 98–107)
CO2 SERPL-SCNC: 28 MMOL/L (ref 21–32)
CREAT SERPL-MCNC: 0.7 MG/DL (ref 0.55–1.3)
EOSINOPHIL NFR BLD AUTO: 0.2 % (ref 0–3)
ERYTHROCYTE [DISTWIDTH] IN BLOOD BY AUTOMATED COUNT: 12 % (ref 11.6–14.8)
GLOBULIN SER-MCNC: 4.4 G/DL
HCT VFR BLD CALC: 34.8 % (ref 42–52)
HGB BLD-MCNC: 11.5 G/DL (ref 14.2–18)
LYMPHOCYTES NFR BLD AUTO: 13.1 % (ref 20–45)
MCV RBC AUTO: 92 FL (ref 80–99)
MONOCYTES NFR BLD AUTO: 14.9 % (ref 1–10)
NEUTROPHILS NFR BLD AUTO: 70.9 % (ref 45–75)
PHOSPHATE SERPL-MCNC: 3.2 MG/DL (ref 2.5–4.9)
PLATELET # BLD: 255 K/UL (ref 150–450)
POTASSIUM SERPL-SCNC: 3.5 MMOL/L (ref 3.5–5.1)
RBC # BLD AUTO: 3.78 M/UL (ref 4.7–6.1)
SODIUM SERPL-SCNC: 133 MMOL/L (ref 136–145)
WBC # BLD AUTO: 12.4 K/UL (ref 4.8–10.8)

## 2019-07-16 RX ADMIN — HEPARIN SODIUM SCH UNITS: 5000 INJECTION INTRAVENOUS; SUBCUTANEOUS at 21:05

## 2019-07-16 RX ADMIN — SODIUM CHLORIDE SCH MLS/HR: 9 INJECTION, SOLUTION INTRAVENOUS at 20:00

## 2019-07-16 RX ADMIN — DIPHENHYDRAMINE HYDROCHLORIDE PRN MG: 50 INJECTION INTRAMUSCULAR; INTRAVENOUS at 17:12

## 2019-07-16 RX ADMIN — HEPARIN SODIUM SCH UNITS: 5000 INJECTION INTRAVENOUS; SUBCUTANEOUS at 08:58

## 2019-07-16 RX ADMIN — SODIUM CHLORIDE SCH MLS/HR: 0.9 INJECTION INTRAVENOUS at 08:59

## 2019-07-16 RX ADMIN — SODIUM CHLORIDE SCH MLS/HR: 0.9 INJECTION INTRAVENOUS at 21:06

## 2019-07-16 RX ADMIN — DOCUSATE SODIUM SCH MG: 100 CAPSULE, LIQUID FILLED ORAL at 13:09

## 2019-07-16 RX ADMIN — DIPHENHYDRAMINE HYDROCHLORIDE PRN MG: 50 INJECTION INTRAMUSCULAR; INTRAVENOUS at 13:09

## 2019-07-16 RX ADMIN — DIPHENHYDRAMINE HYDROCHLORIDE PRN MG: 50 INJECTION INTRAMUSCULAR; INTRAVENOUS at 09:03

## 2019-07-16 RX ADMIN — DOCUSATE SODIUM SCH MG: 100 CAPSULE, LIQUID FILLED ORAL at 08:58

## 2019-07-16 RX ADMIN — SODIUM CHLORIDE SCH MLS/HR: 9 INJECTION, SOLUTION INTRAVENOUS at 21:56

## 2019-07-16 RX ADMIN — DIPHENHYDRAMINE HYDROCHLORIDE PRN MG: 50 INJECTION INTRAMUSCULAR; INTRAVENOUS at 21:33

## 2019-07-16 RX ADMIN — DIPHENHYDRAMINE HYDROCHLORIDE PRN MG: 50 INJECTION INTRAMUSCULAR; INTRAVENOUS at 04:35

## 2019-07-16 RX ADMIN — DOCUSATE SODIUM SCH MG: 100 CAPSULE, LIQUID FILLED ORAL at 17:11

## 2019-07-16 RX ADMIN — DIPHENHYDRAMINE HYDROCHLORIDE PRN MG: 50 INJECTION INTRAMUSCULAR; INTRAVENOUS at 00:07

## 2019-07-16 NOTE — DIAGNOSTIC IMAGING REPORT
Indication: Wound on left leg, with discharge, redness and swelling around the ankle,

pain

 

Technique: Sagittal, coronal, and axial T1 FSE and FSE STIR images of the distal leg

 

Comparison: none

 

Findings: A marker marks an ulcer located anteromedial to the tibial shaft. At the

marker, there is some slight superficial edema of the skin surface and slight surface

irregularity, but the edema in this location is not extensive. There is more

extensive edema of the subcutaneous fat anteriorly and anterolaterally, becoming more

circumferential approaching the ankle. No abnormal bone marrow signal is

demonstrated. No localized fluid collection to suggest abscess is demonstrated. The

calcaneus tendon is intact.

 

Impression: Negative for evidence of osteomyelitis

 

Evidence of superficial soft tissue ulcer, marked by a marker at the anteromedial

shin region

 

Considerable subcutaneous fat edema. Given stated clinical history, probably on the

basis of cellulitis. Correlate with clinical findings. No evidence of drainable

abscess.

## 2019-07-16 NOTE — DIAGNOSTIC IMAGING REPORT
Indication: Dorsal left foot ulcer/blister, with cellulitis

 

Technique: Sagittal, coronal, and axial T1 FSE and FSE STIR images of the left

forefoot

 

Comparison: Foot radiograph dated 7/13/2019

 

Findings: . Superficial fluid collection in the dorsum of the right foot likely

relates to stated clinical history of a blister. Multiple other similar areas are

noted throughout the dorsum of the right foot. There is diffuse edema of the

subcutaneous fat, particularly dorsally and extending into the digits

circumferentially. There is also some deep compartment edema as well. No discrete

collection to suggest abscess demonstrated. No marrow signal abnormality to suggest

acute osteomyelitis is demonstrated

 

Impression: Extensive soft tissue edema, as described. This is in keeping with stated

clinical history of cellulitis. No findings to suggest abscess

 

Very superficial fluid collections likely relate to stated clinical history of dorsal

blisters.

 

No marrow abnormality to suggest acute osteomyelitis demonstrated.

## 2019-07-16 NOTE — PULMONOLOGY PROGRESS NOTE
Assessment/Plan


Problems:  


(1) Sepsis


(2) Cellulitis of left foot


Assessment/Plan


on abx


check cultures


wound care


dvt prophylaxis.





Subjective


ROS Limited/Unobtainable:  No


Constitutional:  Reports: no symptoms


HEENT:  Repors: no symptoms


Allergies:  


Coded Allergies:  


     No Known Allergies (Unverified , 7/13/19)





Objective





Last 24 Hour Vital Signs








  Date Time  Temp Pulse Resp B/P (MAP) Pulse Ox O2 Delivery O2 Flow Rate FiO2


 


7/16/19 12:00 97.4 53 19 129/82 (98) 98   


 


7/16/19 09:00      Room Air  


 


7/16/19 08:00 99.0 90 18 129/75 (93) 98   


 


7/16/19 07:05  86 15  95 Room Air  21


 


7/16/19 05:05 98.1       


 


7/16/19 04:00 99.1 89 20 123/77 (92) 95   


 


7/16/19 00:00 98.6 80 20 141/82 (101) 95   


 


7/15/19 20:55      Room Air  


 


7/15/19 20:47  90 16  96 Room Air  21


 


7/15/19 20:43 98.1       


 


7/15/19 20:00 99.0 91 20 117/71 (86) 96   


 


7/15/19 16:00 98.5 76 19 112/74 (87) 96   

















Intake and Output  


 


 7/15/19 7/16/19





 19:00 07:00


 


Intake Total 650 ml 1195.0 ml


 


Output Total  2500 ml


 


Balance 650 ml -1305.0 ml


 


  


 


Intake Oral  420 ml


 


IV Total 50 ml 775.0 ml


 


Other 600 ml 


 


Output Urine Total  2500 ml


 


# Voids  4








General Appearance:  WD/WN


HEENT:  normocephalic


Respiratory/Chest:  chest wall non-tender, normal breath sounds


Cardiovascular:  normal rate


Abdomen:  normal bowel sounds, no organomegaly


Extremities:  no cyanosis





Microbiology








 Date/Time


Source Procedure


Growth Status


 


 


 7/13/19 20:27


Blood Blood Culture - Preliminary


NO GROWTH AFTER 48 HOURS Resulted


 


 7/13/19 20:19


Blood Blood Culture - Preliminary


NO GROWTH AFTER 48 HOURS Resulted





 7/14/19 11:30


Leg Left Gram Stain - Final Resulted


 


 7/14/19 11:30 Wound Culture - Preliminary


Gram Negative Kenneth Resulted


 


 7/13/19 20:25


Rectum VRE Culture - Final


NO VANCOMYCIN RESISTANT ENTEROCOCCUS ... Complete


 


 7/13/19 20:25


Rectum  Received








Laboratory Tests


7/16/19 05:35: 


White Blood Count 12.4H, Red Blood Count 3.78L, Hemoglobin 11.5L, Hematocrit 

34.8L, Mean Corpuscular Volume 92, Mean Corpuscular Hemoglobin 30.4, Mean 

Corpuscular Hemoglobin Concent 33.0, Red Cell Distribution Width 12.0, Platelet 

Count 255, Mean Platelet Volume 5.5L, Neutrophils (%) (Auto) 70.9, Lymphocytes (

%) (Auto) 13.1L, Monocytes (%) (Auto) 14.9H, Eosinophils (%) (Auto) 0.2, 

Basophils (%) (Auto) 0.8, Erythrocyte Sedimentation Rate 47H, Sodium Level 133L

, Potassium Level 3.5, Chloride Level 98, Carbon Dioxide Level 28, Anion Gap 7, 

Blood Urea Nitrogen 8, Creatinine 0.7, Estimat Glomerular Filtration Rate > 60, 

Glucose Level 96, Uric Acid 3.2, Calcium Level 8.6, Phosphorus Level 3.2, 

Magnesium Level 1.9, Total Bilirubin 0.5, Aspartate Amino Transf (AST/SGOT) 30, 

Alanine Aminotransferase (ALT/SGPT) 20, Alkaline Phosphatase 88, C-Reactive 

Protein, Quantitative 16.4H, Total Protein 6.6, Albumin 2.2L, Globulin 4.4, 

Albumin/Globulin Ratio 0.5L





Current Medications








 Medications


  (Trade)  Dose


 Ordered  Sig/Hrelinda


 Route


 PRN Reason  Start Time


 Stop Time Status Last Admin


Dose Admin


 


 Acetaminophen


  (Tylenol)  650 mg  Q4H  PRN


 ORAL


 fever  7/14/19 08:15


 8/13/19 08:14  7/15/19 00:52


 


 


 Albuterol/


 Ipratropium


  (Albuterol/


 Ipratropium)  3 ml  Q4H  PRN


 HHN


 Shortness of Breath  7/14/19 08:15


 7/19/19 08:14   


 


 


 Cefepime HCl 1 gm/


 Dextrose  55 ml @ 


 110 mls/hr  EVERY 12  HOURS


 IVPB


   7/16/19 09:00


 7/21/19 10:00  7/16/19 08:59


 


 


 Dextrose


  (Dextrose 50%)  25 ml  Q30M  PRN


 IV


 Hypoglycemia  7/14/19 08:30


 8/13/19 08:16   


 


 


 Dextrose


  (Dextrose 50%)  50 ml  Q30M  PRN


 IV


 hypoglycemia  7/14/19 08:30


 8/13/19 08:29   


 


 


 Dextrose/


 Electrolytes  1,000 ml @ 


 50 mls/hr  Q20H


 IV


   7/15/19 10:00


 8/13/19 09:59  7/15/19 09:39


 


 


 Docusate Sodium


  (Colace)  100 mg  THREE TIMES A  DAY


 ORAL


   7/14/19 18:00


 8/13/19 17:59  7/16/19 13:09


 


 


 Folic Acid


  (Folate)  3 mg  DAILY


 ORAL


   7/15/19 09:15


 8/14/19 09:14  7/16/19 08:58


 


 


 Heparin Sodium


  (Porcine)


  (Heparin 5000


 units/ml)  5,000 units  EVERY 12  HOURS


 SUBQ


   7/14/19 09:00


 8/13/19 08:59  7/16/19 08:58


 


 


 Hydromorphone HCl


  (Dilaudid)  1 mg  Q4H  PRN


 IVP


 Severe Pain (Pain Scale 7-10)  7/15/19 22:15


 7/22/19 22:14  7/16/19 13:09


 


 


 Nitroglycerin


  (Ntg)  0.4 mg  Q5M  PRN


 SL


 Prn Chest Pain  7/14/19 08:15


 8/13/19 08:14   


 


 


 Ondansetron HCl


  (Zofran)  4 mg  Q6H  PRN


 IVP


 Nausea & Vomiting  7/14/19 08:15


 8/13/19 08:14   


 


 


 Pantoprazole


  (Protonix)  40 mg  EVERY 12  HOURS


 ORAL


   7/14/19 21:00


 8/13/19 20:59  7/16/19 08:58


 


 


 Polyethylene


 Glycol


  (Miralax)  17 gm  DAILYPRN  PRN


 ORAL


 Constipation  7/14/19 08:15


 8/13/19 08:14  7/14/19 11:32


 


 


 Temazepam


  (Restoril)  15 mg  HSPRN  PRN


 ORAL


 Insomnia  7/14/19 08:15


 7/21/19 08:14   


 


 


 Vancomycin HCl


  (Vanco rx to


 dose)  1 ea  DAILY  PRN


 MISC


 .  7/14/19 08:30


 8/13/19 08:29   


 


 


 Vancomycin HCl 1


 gm/Dextrose  275 ml @ 


 183.3 mls/


 hr  Q24H


 IVPB


   7/14/19 20:00


 7/19/19 19:59  7/15/19 19:56


 

















Pia Coe MD Jul 16, 2019 13:36

## 2019-07-16 NOTE — DIAGNOSTIC IMAGING REPORT
Indication: Left ankle cellulitis

 

Technique: Sagittal, axial, and coronal T1 FSE and FSE STIR images obtained of the

ankle and hindfoot

 

Comparison: none

 

Findings: Patchy areas of increased STIR and decreased T1 signal are seen within the

anterior talus, anterior calcaneus, posterior navicular bones, with minimal

involvement of the cuboid. The cuneiforms appear unremarkable. There is considerable

edema of the fat of the dorsum of the foot and circumferentially surrounding the

ankle. No definite discrete fluid collection demonstrated.

 

Impression: Patchy areas of marrow edema, as described. Periarticular distribution of

this is suggestive of arthropathy which may be degenerative, inflammatory, or, most

likely, on the basis of Charcot-type changes. Per technologist, there are no ulcers

in the area so osteomyelitis is deemed much less likely.

 

Extensive edema of the subcutaneous fat. Given stated clinical history of cellulitis,

most likely on the basis of such. Correlate with clinical findings. No drainable

abscess collection demonstrated.

## 2019-07-16 NOTE — GENERAL PROGRESS NOTE
Assessment/Plan


Problem List:  


(1) Sepsis


ICD Codes:  A41.9 - Sepsis, unspecified organism


SNOMED:  58142982


(2) Cellulitis of left foot


ICD Codes:  L03.116 - Cellulitis of left lower limb


SNOMED:  341172533


Status:  stable, progressing


Assessment/Plan:


wound care abx pain control cbc bmp am





Subjective


Constitutional:  Reports: weakness


Allergies:  


Coded Allergies:  


     No Known Allergies (Unverified , 7/13/19)


All Systems:  reviewed and negative except above


Subjective


sleepy calm





Objective





Last 24 Hour Vital Signs








  Date Time  Temp Pulse Resp B/P (MAP) Pulse Ox O2 Delivery O2 Flow Rate FiO2


 


7/16/19 12:00 97.4 53 19 129/82 (98) 98   


 


7/16/19 09:00      Room Air  


 


7/16/19 08:00 99.0 90 18 129/75 (93) 98   


 


7/16/19 07:05  86 15  95 Room Air  21


 


7/16/19 05:05 98.1       


 


7/16/19 04:00 99.1 89 20 123/77 (92) 95   


 


7/16/19 00:00 98.6 80 20 141/82 (101) 95   


 


7/15/19 20:55      Room Air  


 


7/15/19 20:47  90 16  96 Room Air  21


 


7/15/19 20:43 98.1       


 


7/15/19 20:00 99.0 91 20 117/71 (86) 96   


 


7/15/19 16:00 98.5 76 19 112/74 (87) 96   

















Intake and Output  


 


 7/15/19 7/16/19





 19:00 07:00


 


Intake Total 650 ml 1195.0 ml


 


Output Total  2500 ml


 


Balance 650 ml -1305.0 ml


 


  


 


Intake Oral  420 ml


 


IV Total 50 ml 775.0 ml


 


Other 600 ml 


 


Output Urine Total  2500 ml


 


# Voids  4








Laboratory Tests


7/16/19 05:35: 


White Blood Count 12.4H, Red Blood Count 3.78L, Hemoglobin 11.5L, Hematocrit 

34.8L, Mean Corpuscular Volume 92, Mean Corpuscular Hemoglobin 30.4, Mean 

Corpuscular Hemoglobin Concent 33.0, Red Cell Distribution Width 12.0, Platelet 

Count 255, Mean Platelet Volume 5.5L, Neutrophils (%) (Auto) 70.9, Lymphocytes (

%) (Auto) 13.1L, Monocytes (%) (Auto) 14.9H, Eosinophils (%) (Auto) 0.2, 

Basophils (%) (Auto) 0.8, Erythrocyte Sedimentation Rate 47H, Sodium Level 133L

, Potassium Level 3.5, Chloride Level 98, Carbon Dioxide Level 28, Anion Gap 7, 

Blood Urea Nitrogen 8, Creatinine 0.7, Estimat Glomerular Filtration Rate > 60, 

Glucose Level 96, Uric Acid 3.2, Calcium Level 8.6, Phosphorus Level 3.2, 

Magnesium Level 1.9, Total Bilirubin 0.5, Aspartate Amino Transf (AST/SGOT) 30, 

Alanine Aminotransferase (ALT/SGPT) 20, Alkaline Phosphatase 88, C-Reactive 

Protein, Quantitative 16.4H, Total Protein 6.6, Albumin 2.2L, Globulin 4.4, 

Albumin/Globulin Ratio 0.5L


Height (Feet):  6


Height (Inches):  1.00


Weight (Pounds):  169


General Appearance:  lethargic


EENT:  normal ENT inspection


Neck:  normal alignment


Cardiovascular:  normal peripheral pulses, normal rate, regular rhythm


Respiratory/Chest:  chest wall non-tender, lungs clear, normal breath sounds


Abdomen:  normal bowel sounds, non tender, soft


Extremities:  normal inspection


Edema:  1+ Arm (L), 1+ Arm (R), 1+ Leg (L), 1+ Leg (R), 1+ Pedal (L), 1+ Pedal (

R), 1+ Generalized


Neurologic:  responsive, motor weakness


Skin:  normal pigmentation, warm/dry


Objective


l foor sl red and swollen up to ankle











Freddy Muñiz DO Jul 16, 2019 14:25

## 2019-07-16 NOTE — NUR
SI:SEPSIS . CELLULITIS OF LEFT FOOT 

VS: /82, P 53, T 97.4, RR 19, SpO2 98

WBC 12.4, RBC 3.78, H&H 11.5/34.8, Na 133



IS:DILAUDID 1mg IVP

CEFEPIME 55ml IVPB

HEPARIN SUBQ

PROTONIX 40mg

FOLATE 3mg



PLAN:  WOUND CARE

ABX

DVT PROPHYLAXIS



***************MED/SURG STATUS***************

## 2019-07-16 NOTE — NUR
*** INSURANCE ***



UPDATED CLINICALS and REVIEW HAVE BEEN FAXED TO:

PLEASE FAX THE REVIEW AND CLINICAL TO





F/S FAXED TO [facility]: KATHERYN

P-  X [phone]

F ............REVIEW/CLINICAL

## 2019-07-16 NOTE — NEPHROLOGY PROGRESS NOTE
Assessment/Plan


Problem List:  


(1) Cellulitis of left foot


(2) Sepsis


(3) Hyponatremia


(4) Hypoalbuminemia


Assessment


Left foot cellulitis / Leukocytosis  / No fever


Low Na


Low K


Anemia


High Lactic


Low Albumin


Plan








recheck BMI


Isotonic solution


K supplement


monitor lytes


avoid nephrotoxics


urine tox screen positive for Amphetamines and Opiates


per orders





Subjective


ROS Limited/Unobtainable:  No


Constitutional:  Reports: malaise





Objective


Objective





Last 24 Hour Vital Signs








  Date Time  Temp Pulse Resp B/P (MAP) Pulse Ox O2 Delivery O2 Flow Rate FiO2


 


7/16/19 12:00 97.4 53 19 129/82 (98) 98   


 


7/16/19 09:00      Room Air  


 


7/16/19 08:00 99.0 90 18 129/75 (93) 98   


 


7/16/19 07:05  86 15  95 Room Air  21


 


7/16/19 05:05 98.1       


 


7/16/19 04:00 99.1 89 20 123/77 (92) 95   


 


7/16/19 00:00 98.6 80 20 141/82 (101) 95   


 


7/15/19 20:55      Room Air  


 


7/15/19 20:47  90 16  96 Room Air  21


 


7/15/19 20:43 98.1       


 


7/15/19 20:00 99.0 91 20 117/71 (86) 96   


 


7/15/19 16:00 98.5 76 19 112/74 (87) 96   

















Intake and Output  


 


 7/15/19 7/16/19





 19:00 07:00


 


Intake Total 650 ml 1195.0 ml


 


Output Total  2500 ml


 


Balance 650 ml -1305.0 ml


 


  


 


Intake Oral  420 ml


 


IV Total 50 ml 775.0 ml


 


Other 600 ml 


 


Output Urine Total  2500 ml


 


# Voids  4








Laboratory Tests


7/16/19 05:35: 


White Blood Count 12.4H, Red Blood Count 3.78L, Hemoglobin 11.5L, Hematocrit 

34.8L, Mean Corpuscular Volume 92, Mean Corpuscular Hemoglobin 30.4, Mean 

Corpuscular Hemoglobin Concent 33.0, Red Cell Distribution Width 12.0, Platelet 

Count 255, Mean Platelet Volume 5.5L, Neutrophils (%) (Auto) 70.9, Lymphocytes (

%) (Auto) 13.1L, Monocytes (%) (Auto) 14.9H, Eosinophils (%) (Auto) 0.2, 

Basophils (%) (Auto) 0.8, Erythrocyte Sedimentation Rate 47H, Sodium Level 133L

, Potassium Level 3.5, Chloride Level 98, Carbon Dioxide Level 28, Anion Gap 7, 

Blood Urea Nitrogen 8, Creatinine 0.7, Estimat Glomerular Filtration Rate > 60, 

Glucose Level 96, Uric Acid 3.2, Calcium Level 8.6, Phosphorus Level 3.2, 

Magnesium Level 1.9, Total Bilirubin 0.5, Aspartate Amino Transf (AST/SGOT) 30, 

Alanine Aminotransferase (ALT/SGPT) 20, Alkaline Phosphatase 88, C-Reactive 

Protein, Quantitative 16.4H, Total Protein 6.6, Albumin 2.2L, Globulin 4.4, 

Albumin/Globulin Ratio 0.5L


Height (Feet):  6


Height (Inches):  1.00


Weight (Pounds):  98


General Appearance:  no apparent distress


Cardiovascular:  normal rate


Respiratory/Chest:  lungs clear


Abdomen:  soft


Extremities:  other - unchanged


Objective


no change











Manuel Norton MD Jul 16, 2019 13:09

## 2019-07-16 NOTE — NUR
NURSE NOTES:

received patient resting in bed.patient awake, alert oriented, no sign of distress, denies 
pain, dressing on LLE dry and intact, elevated on pillow. call light within  reach, on fall 
precaution, bed in low position,  Will continue to monitor.



genevieve rea

## 2019-07-16 NOTE — NUR
HAND-OFF: 

Report given to Ms Brayan RN

patient resting comfortably no sign of distress



genevieve rea

## 2019-07-16 NOTE — NUR
NURSE NOTES:Patient received  from ESTEPHANIE SALVADOR. Patient denies any pain at this time . no s/s 
of distress  rac g#20 ivf infusing well . left foot dressing C/D/I/and elevated with two 
pillows . call light within reach . bed in low position  at all times .will continue to 
monitor .

## 2019-07-16 NOTE — INFECTIOUS DISEASES PROG NOTE
Assessment/Plan


Assessment/Plan





Assessment/Plan:


60 yo male who presnted to the ED on 7/12/19 with left leg cellulitis.





Left foot cellulitis


    Leukocytosis ;improving


    Low grade fevers


       -Tibia/fibula MRI: Negative for evidence of osteomyelitis. Evidence of 

superficial soft tissue ulcer, marked by a marker at the anteromedial shin 

region.Considerable subcutaneous fat edema. Given stated clinical history, 

probably on the basis of cellulitis. Correlate with clinical findings. No 

evidence of drainable abscess.


      -foot MRI:  Extensive soft tissue edema, as described. This is in keeping 

with stated clinical history of cellulitis. No findings to suggest abscess Very 

superficial fluid collections likely relate to stated clinical history of 

dorsal blisters. No marrow abnormality to suggest acute osteomyelitis 

demonstrated.


      -ANkle MRI:  Patchy areas of marrow edema, as described. Periarticular 

distribution of this is suggestive of arthropathy which may be degenerative, 

inflammatory, or, most likely, on the basis of Charcot-type changes. Per 

technologist, there are no ulcers in the area so osteomyelitis is deemed much 

less likely. Extensive edema of the subcutaneous fat. Given stated clinical 

history of cellulitis most likely on the basis of such. Correlate with clinical 

findings. No drainable


abscess collection demonstrated.


 


        -Foot xray: No acute injury identified. Soft tissue swelling. Multiple 

incidental findings as described











PLAN:





 - Continue Cefepime #3 and Vancomycin #3


 - f/u wound cx


 - Monitor CBC and Temps


 -Podiatry f/u





Thank you for this consult. We will continue to follow the patient during this 

hospitalization.





Subjective


Allergies:  


Coded Allergies:  


     No Known Allergies (Unverified , 7/13/19)


Subjective


Tm 100.8


WBC improving


BCx NTD





Objective


Vital Signs





Last 24 Hour Vital Signs








  Date Time  Temp Pulse Resp B/P (MAP) Pulse Ox O2 Delivery O2 Flow Rate FiO2


 


7/16/19 16:13 100.9 90 19 137/84 (101) 98   


 


7/16/19 12:00 97.4 53 19 129/82 (98) 98   


 


7/16/19 09:00      Room Air  


 


7/16/19 08:00 99.0 90 18 129/75 (93) 98   


 


7/16/19 07:05  86 15  95 Room Air  21


 


7/16/19 05:05 98.1       


 


7/16/19 04:00 99.1 89 20 123/77 (92) 95   


 


7/16/19 00:00 98.6 80 20 141/82 (101) 95   


 


7/15/19 20:55      Room Air  


 


7/15/19 20:47  90 16  96 Room Air  21


 


7/15/19 20:43 98.1       


 


7/15/19 20:00 99.0 91 20 117/71 (86) 96   








Height (Feet):  6


Height (Inches):  1.00


Weight (Pounds):  169


Objective


Gen:  NAD


HEENT: NCAT, MMM, EOMI, PERRL, No Oral lesion, no scleral icterus 


NECK:  full range of motion, supple, no meningismus, No LAD, No JVD


LUNGS:  CTAB, No W/C, No Accessory muscle use


CARDS: RRR, S1, S2, No M/R/G, 


ABD: Soft, NT, ND, No R/G, + BS, No HSM, No Masses


: Deferred


Ext: C/C/E, Pulses 2+ B/L (DP, Rad): LLE swelling with erythema and pain, 

Ulceration present with purulence.


NEURO: A/O x 4, Strength and Sensation Grossly intact


PSYCH: Normal mood and affect


SKIN:  Warm/dry, No rashes





Microbiology








 Date/Time


Source Procedure


Growth Status


 


 


 7/13/19 20:27


Blood Blood Culture - Preliminary


NO GROWTH AFTER 48 HOURS Resulted


 


 7/13/19 20:19


Blood Blood Culture - Preliminary


NO GROWTH AFTER 48 HOURS Resulted





 7/14/19 11:30


Leg Left Gram Stain - Final Resulted


 


 7/14/19 11:30 Wound Culture - Preliminary


Gram Negative Kenneth Resulted


 


 7/13/19 20:25


Rectum VRE Culture - Final


NO VANCOMYCIN RESISTANT ENTEROCOCCUS ... Complete


 


 7/13/19 20:25


Rectum  Received











Laboratory Tests








Test


  7/16/19


05:35


 


White Blood Count


  12.4 K/UL


(4.8-10.8)  H


 


Red Blood Count


  3.78 M/UL


(4.70-6.10)  L


 


Hemoglobin


  11.5 G/DL


(14.2-18.0)  L


 


Hematocrit


  34.8 %


(42.0-52.0)  L


 


Mean Corpuscular Volume 92 FL (80-99)  


 


Mean Corpuscular Hemoglobin


  30.4 PG


(27.0-31.0)


 


Mean Corpuscular Hemoglobin


Concent 33.0 G/DL


(32.0-36.0)


 


Red Cell Distribution Width


  12.0 %


(11.6-14.8)


 


Platelet Count


  255 K/UL


(150-450)


 


Mean Platelet Volume


  5.5 FL


(6.5-10.1)  L


 


Neutrophils (%) (Auto)


  70.9 %


(45.0-75.0)


 


Lymphocytes (%) (Auto)


  13.1 %


(20.0-45.0)  L


 


Monocytes (%) (Auto)


  14.9 %


(1.0-10.0)  H


 


Eosinophils (%) (Auto)


  0.2 %


(0.0-3.0)


 


Basophils (%) (Auto)


  0.8 %


(0.0-2.0)


 


Erythrocyte Sedimentation Rate


  47 MM/HR


(0-20)  H


 


Sodium Level


  133 MMOL/L


(136-145)  L


 


Potassium Level


  3.5 MMOL/L


(3.5-5.1)


 


Chloride Level


  98 MMOL/L


()


 


Carbon Dioxide Level


  28 MMOL/L


(21-32)


 


Anion Gap


  7 mmol/L


(5-15)


 


Blood Urea Nitrogen


  8 mg/dL (7-18)


 


 


Creatinine


  0.7 MG/DL


(0.55-1.30)


 


Estimat Glomerular Filtration


Rate > 60 mL/min


(>60)


 


Glucose Level


  96 MG/DL


()


 


Uric Acid


  3.2 MG/DL


(2.6-7.2)


 


Calcium Level


  8.6 MG/DL


(8.5-10.1)


 


Phosphorus Level


  3.2 MG/DL


(2.5-4.9)


 


Magnesium Level


  1.9 MG/DL


(1.8-2.4)


 


Total Bilirubin


  0.5 MG/DL


(0.2-1.0)


 


Aspartate Amino Transf


(AST/SGOT) 30 U/L (15-37)


 


 


Alanine Aminotransferase


(ALT/SGPT) 20 U/L (12-78)


 


 


Alkaline Phosphatase


  88 U/L


()


 


C-Reactive Protein,


Quantitative 16.4 mg/dL


(0.00-0.90)  H


 


Total Protein


  6.6 G/DL


(6.4-8.2)


 


Albumin


  2.2 G/DL


(3.4-5.0)  L


 


Globulin 4.4 g/dL  


 


Albumin/Globulin Ratio


  0.5 (1.0-2.7)


L











Current Medications








 Medications


  (Trade)  Dose


 Ordered  Sig/Herlinda


 Route


 PRN Reason  Start Time


 Stop Time Status Last Admin


Dose Admin


 


 Acetaminophen


  (Tylenol)  650 mg  Q4H  PRN


 ORAL


 fever  7/14/19 08:15


 8/13/19 08:14  7/15/19 00:52


 


 


 Albuterol/


 Ipratropium


  (Albuterol/


 Ipratropium)  3 ml  Q4H  PRN


 HHN


 Shortness of Breath  7/14/19 08:15


 7/19/19 08:14   


 


 


 Cefepime HCl 1 gm/


 Dextrose  55 ml @ 


 110 mls/hr  EVERY 12  HOURS


 IVPB


   7/16/19 09:00


 7/21/19 10:00  7/16/19 08:59


 


 


 Dextrose


  (Dextrose 50%)  25 ml  Q30M  PRN


 IV


 Hypoglycemia  7/14/19 08:30


 8/13/19 08:16   


 


 


 Dextrose


  (Dextrose 50%)  50 ml  Q30M  PRN


 IV


 hypoglycemia  7/14/19 08:30


 8/13/19 08:29   


 


 


 Dextrose/


 Electrolytes  1,000 ml @ 


 50 mls/hr  Q20H


 IV


   7/15/19 10:00


 8/13/19 09:59  7/15/19 09:39


 


 


 Docusate Sodium


  (Colace)  100 mg  THREE TIMES A  DAY


 ORAL


   7/14/19 18:00


 8/13/19 17:59  7/16/19 17:11


 


 


 Folic Acid


  (Folate)  3 mg  DAILY


 ORAL


   7/15/19 09:15


 8/14/19 09:14  7/16/19 08:58


 


 


 Heparin Sodium


  (Porcine)


  (Heparin 5000


 units/ml)  5,000 units  EVERY 12  HOURS


 SUBQ


   7/14/19 09:00


 8/13/19 08:59  7/16/19 08:58


 


 


 Hydromorphone HCl


  (Dilaudid)  1 mg  Q4H  PRN


 IVP


 Severe Pain (Pain Scale 7-10)  7/15/19 22:15


 7/22/19 22:14  7/16/19 17:12


 


 


 Nitroglycerin


  (Ntg)  0.4 mg  Q5M  PRN


 SL


 Prn Chest Pain  7/14/19 08:15


 8/13/19 08:14   


 


 


 Ondansetron HCl


  (Zofran)  4 mg  Q6H  PRN


 IVP


 Nausea & Vomiting  7/14/19 08:15


 8/13/19 08:14   


 


 


 Pantoprazole


  (Protonix)  40 mg  EVERY 12  HOURS


 ORAL


   7/14/19 21:00


 8/13/19 20:59  7/16/19 08:58


 


 


 Polyethylene


 Glycol


  (Miralax)  17 gm  DAILYPRN  PRN


 ORAL


 Constipation  7/14/19 08:15


 8/13/19 08:14  7/14/19 11:32


 


 


 Temazepam


  (Restoril)  15 mg  HSPRN  PRN


 ORAL


 Insomnia  7/14/19 08:15


 7/21/19 08:14   


 


 


 Vancomycin HCl


  (Vanco rx to


 dose)  1 ea  DAILY  PRN


 MISC


 .  7/14/19 08:30


 8/13/19 08:29   


 


 


 Vancomycin HCl 1


 gm/Dextrose  275 ml @ 


 183.3 mls/


 hr  Q24H


 IVPB


   7/14/19 20:00


 7/19/19 19:59  7/15/19 19:56


 

















Sarah Sinclair M.D. Jul 16, 2019 18:16

## 2019-07-17 VITALS — SYSTOLIC BLOOD PRESSURE: 105 MMHG | DIASTOLIC BLOOD PRESSURE: 68 MMHG

## 2019-07-17 VITALS — SYSTOLIC BLOOD PRESSURE: 120 MMHG | DIASTOLIC BLOOD PRESSURE: 72 MMHG

## 2019-07-17 VITALS — DIASTOLIC BLOOD PRESSURE: 85 MMHG | SYSTOLIC BLOOD PRESSURE: 139 MMHG

## 2019-07-17 VITALS — DIASTOLIC BLOOD PRESSURE: 52 MMHG | SYSTOLIC BLOOD PRESSURE: 124 MMHG

## 2019-07-17 VITALS — DIASTOLIC BLOOD PRESSURE: 79 MMHG | SYSTOLIC BLOOD PRESSURE: 127 MMHG

## 2019-07-17 VITALS — SYSTOLIC BLOOD PRESSURE: 134 MMHG | DIASTOLIC BLOOD PRESSURE: 77 MMHG

## 2019-07-17 LAB
ADD MANUAL DIFF: NO
ALBUMIN SERPL-MCNC: 2.1 G/DL (ref 3.4–5)
ALBUMIN/GLOB SERPL: 0.5 {RATIO} (ref 1–2.7)
ALP SERPL-CCNC: 89 U/L (ref 46–116)
ALT SERPL-CCNC: 19 U/L (ref 12–78)
ANION GAP SERPL CALC-SCNC: 6 MMOL/L (ref 5–15)
AST SERPL-CCNC: 28 U/L (ref 15–37)
BASOPHILS NFR BLD AUTO: 0.6 % (ref 0–2)
BILIRUB SERPL-MCNC: 0.5 MG/DL (ref 0.2–1)
BUN SERPL-MCNC: 8 MG/DL (ref 7–18)
CALCIUM SERPL-MCNC: 8.7 MG/DL (ref 8.5–10.1)
CHLORIDE SERPL-SCNC: 99 MMOL/L (ref 98–107)
CO2 SERPL-SCNC: 28 MMOL/L (ref 21–32)
CREAT SERPL-MCNC: 0.8 MG/DL (ref 0.55–1.3)
EOSINOPHIL NFR BLD AUTO: 0.5 % (ref 0–3)
ERYTHROCYTE [DISTWIDTH] IN BLOOD BY AUTOMATED COUNT: 12.3 % (ref 11.6–14.8)
GLOBULIN SER-MCNC: 4.5 G/DL
HCT VFR BLD CALC: 34.8 % (ref 42–52)
HGB BLD-MCNC: 11.6 G/DL (ref 14.2–18)
LYMPHOCYTES NFR BLD AUTO: 18.3 % (ref 20–45)
MCV RBC AUTO: 92 FL (ref 80–99)
MONOCYTES NFR BLD AUTO: 15 % (ref 1–10)
NEUTROPHILS NFR BLD AUTO: 65.6 % (ref 45–75)
PHOSPHATE SERPL-MCNC: 3.3 MG/DL (ref 2.5–4.9)
PLATELET # BLD: 296 K/UL (ref 150–450)
POTASSIUM SERPL-SCNC: 3.5 MMOL/L (ref 3.5–5.1)
RBC # BLD AUTO: 3.79 M/UL (ref 4.7–6.1)
SODIUM SERPL-SCNC: 133 MMOL/L (ref 136–145)
WBC # BLD AUTO: 11.4 K/UL (ref 4.8–10.8)

## 2019-07-17 RX ADMIN — SODIUM CHLORIDE SCH MLS/HR: 9 INJECTION, SOLUTION INTRAVENOUS at 22:17

## 2019-07-17 RX ADMIN — DIPHENHYDRAMINE HYDROCHLORIDE PRN MG: 50 INJECTION INTRAMUSCULAR; INTRAVENOUS at 01:34

## 2019-07-17 RX ADMIN — DIPHENHYDRAMINE HYDROCHLORIDE PRN MG: 50 INJECTION INTRAMUSCULAR; INTRAVENOUS at 20:22

## 2019-07-17 RX ADMIN — DIPHENHYDRAMINE HYDROCHLORIDE PRN MG: 50 INJECTION INTRAMUSCULAR; INTRAVENOUS at 05:34

## 2019-07-17 RX ADMIN — SODIUM CHLORIDE SCH MLS/HR: 9 INJECTION, SOLUTION INTRAVENOUS at 05:32

## 2019-07-17 RX ADMIN — HEPARIN SODIUM SCH UNITS: 5000 INJECTION INTRAVENOUS; SUBCUTANEOUS at 08:32

## 2019-07-17 RX ADMIN — HEPARIN SODIUM SCH UNITS: 5000 INJECTION INTRAVENOUS; SUBCUTANEOUS at 20:28

## 2019-07-17 RX ADMIN — DIPHENHYDRAMINE HYDROCHLORIDE PRN MG: 50 INJECTION INTRAMUSCULAR; INTRAVENOUS at 14:58

## 2019-07-17 RX ADMIN — DOCUSATE SODIUM SCH MG: 100 CAPSULE, LIQUID FILLED ORAL at 17:11

## 2019-07-17 RX ADMIN — DIPHENHYDRAMINE HYDROCHLORIDE PRN MG: 50 INJECTION INTRAMUSCULAR; INTRAVENOUS at 10:40

## 2019-07-17 RX ADMIN — DOCUSATE SODIUM SCH MG: 100 CAPSULE, LIQUID FILLED ORAL at 12:19

## 2019-07-17 RX ADMIN — SODIUM CHLORIDE SCH MLS/HR: 0.9 INJECTION INTRAVENOUS at 20:22

## 2019-07-17 RX ADMIN — DOCUSATE SODIUM SCH MG: 100 CAPSULE, LIQUID FILLED ORAL at 08:26

## 2019-07-17 RX ADMIN — SODIUM CHLORIDE SCH MLS/HR: 9 INJECTION, SOLUTION INTRAVENOUS at 12:17

## 2019-07-17 RX ADMIN — SODIUM CHLORIDE SCH MLS/HR: 0.9 INJECTION INTRAVENOUS at 08:26

## 2019-07-17 NOTE — PODIATRIC PROGRESS NOTE
Assessment/Plan


Patient


Ray Gonzalez is a 59 year old male who was admitted on Jul 13, 2019 at 22:

58 with


Assessment/Plan


A/


1) Cellulitis LLE


2) Chronic pain 





P/


1) MRI reviewed, no surgical indication. Leukocytosis improved and trending 

down. Cont abx per ID. Agree with SNF placement for continued care and abx 

management. 


2) Patient wishes to have his pain management help him with his chronic pain 

when he is sent to SNF


3) Clear to d/c from my standpoint


4) Started Ibuprofen 600mg TID with food to help with pain





Subjective


Allergies:  


Coded Allergies:  


     No Known Allergies (Unverified , 7/13/19)


Subjective


Patient states that pain is improved but does have pain that prevents him from 

walking. He denies f/c/n/v. He needs to get in touch with his pain management 

doctor to help with managing his chronic pain in his back and right LE





Objective


Exam





Last 24 Hour Vital Signs








  Date Time  Temp Pulse Resp B/P (MAP) Pulse Ox O2 Delivery O2 Flow Rate FiO2


 


7/17/19 11:51 98.4 83 18 127/79 (95) 96   


 


7/17/19 11:10 97.9       


 


7/17/19 09:00      Room Air  


 


7/17/19 08:00 97.9 89 18 134/77 (96) 97   


 


7/17/19 04:00 98.5 83 18 124/52 (76) 97   


 


7/17/19 00:00 98.2 95 17 139/85 (103) 97   


 


7/16/19 21:00      Room Air  


 


7/16/19 20:03  88 18  97 Room Air  21


 


7/16/19 20:00 98.3 96 17 146/93 (110) 97   


 


7/16/19 16:13 100.9 90 19 137/84 (101) 98   











Laboratory Tests








Test


  7/16/19


19:15 7/17/19


04:40


 


Vancomycin Level Trough


  0.8 ug/mL


(5.0-12.0)  L 


 


 


White Blood Count


  


  11.4 K/UL


(4.8-10.8)  H


 


Red Blood Count


  


  3.79 M/UL


(4.70-6.10)  L


 


Hemoglobin


  


  11.6 G/DL


(14.2-18.0)  L


 


Hematocrit


  


  34.8 %


(42.0-52.0)  L


 


Mean Corpuscular Volume  92 FL (80-99)  


 


Mean Corpuscular Hemoglobin


  


  30.5 PG


(27.0-31.0)


 


Mean Corpuscular Hemoglobin


Concent 


  33.3 G/DL


(32.0-36.0)


 


Red Cell Distribution Width


  


  12.3 %


(11.6-14.8)


 


Platelet Count


  


  296 K/UL


(150-450)


 


Mean Platelet Volume


  


  5.1 FL


(6.5-10.1)  L


 


Neutrophils (%) (Auto)


  


  65.6 %


(45.0-75.0)


 


Lymphocytes (%) (Auto)


  


  18.3 %


(20.0-45.0)  L


 


Monocytes (%) (Auto)


  


  15.0 %


(1.0-10.0)  H


 


Eosinophils (%) (Auto)


  


  0.5 %


(0.0-3.0)


 


Basophils (%) (Auto)


  


  0.6 %


(0.0-2.0)


 


Sodium Level


  


  133 MMOL/L


(136-145)  L


 


Potassium Level


  


  3.5 MMOL/L


(3.5-5.1)


 


Chloride Level


  


  99 MMOL/L


()


 


Carbon Dioxide Level


  


  28 MMOL/L


(21-32)


 


Anion Gap


  


  6 mmol/L


(5-15)


 


Blood Urea Nitrogen


  


  8 mg/dL (7-18)


 


 


Creatinine


  


  0.8 MG/DL


(0.55-1.30)


 


Estimat Glomerular Filtration


Rate 


  > 60 mL/min


(>60)


 


Glucose Level


  


  103 MG/DL


()


 


Calcium Level


  


  8.7 MG/DL


(8.5-10.1)


 


Phosphorus Level


  


  3.3 MG/DL


(2.5-4.9)


 


Magnesium Level


  


  1.9 MG/DL


(1.8-2.4)


 


Total Bilirubin


  


  0.5 MG/DL


(0.2-1.0)


 


Aspartate Amino Transf


(AST/SGOT) 


  28 U/L (15-37)


 


 


Alanine Aminotransferase


(ALT/SGPT) 


  19 U/L (12-78)


 


 


Alkaline Phosphatase


  


  89 U/L


()


 


C-Reactive Protein,


Quantitative 


  12.6 mg/dL


(0.00-0.90)  H


 


Pro-B-Type Natriuretic Peptide


  


  742 pg/mL


(0-125)  H


 


Total Protein


  


  6.6 G/DL


(6.4-8.2)


 


Albumin


  


  2.1 G/DL


(3.4-5.0)  L


 


Globulin  4.5 g/dL  


 


Albumin/Globulin Ratio


  


  0.5 (1.0-2.7)


L











Microbiology








 Date/Time


Source Procedure


Growth Status


 


 


 7/13/19 20:27


Blood Blood Culture - Preliminary


NO GROWTH AFTER 72 HOURS Resulted


 


 7/13/19 20:25


Nasal Nares MRSA Culture - Final


NO METHICILLIN RESISTANT STAPH AUREUS... Complete





 7/14/19 11:30


Leg Left Gram Stain - Final Resulted


 


 7/14/19 11:30 Wound Culture - Preliminary


Klebsiella Oxytoca


Citrobacter Freundii


Proteus Mirabilis


Streptococcus Group A


Staphylococcus Aureus Resulted











Dermatological


Dermatological Narrative


Right foot unremarkable for wounds or signs of acute bacterial infection


Left foot dry scab noted on dorsal left foot, no drainage, edema resolving and 

skin lines apparent. Much improved as compared to admission pictures. No 

fluctuance appreciated











Magdiel Randolph DPM Jul 17, 2019 13:26

## 2019-07-17 NOTE — NEPHROLOGY PROGRESS NOTE
Assessment/Plan


Problem List:  


(1) Cellulitis of left foot


(2) Sepsis


(3) Hyponatremia


(4) Hypoalbuminemia


Assessment


Left foot cellulitis / Leukocytosis  / No fever


Low Na


Low K


Anemia


High Lactic


Low Albumin


Plan








recheck BMI now 22.03


Isotonic solution


K supplement


DC IV fluids


monitor lytes


avoid nephrotoxics


urine tox screen positive for Amphetamines and Opiates


per orders


stable from renal stand if discharged





Subjective


ROS Limited/Unobtainable:  No


Constitutional:  Reports: malaise





Objective


Objective





Last 24 Hour Vital Signs








  Date Time  Temp Pulse Resp B/P (MAP) Pulse Ox O2 Delivery O2 Flow Rate FiO2


 


7/17/19 11:51 98.4 83 18 127/79 (95) 96   


 


7/17/19 11:10 97.9       


 


7/17/19 09:00      Room Air  


 


7/17/19 08:00 97.9 89 18 134/77 (96) 97   


 


7/17/19 04:00 98.5 83 18 124/52 (76) 97   


 


7/17/19 00:00 98.2 95 17 139/85 (103) 97   


 


7/16/19 21:00      Room Air  


 


7/16/19 20:03  88 18  97 Room Air  21


 


7/16/19 20:00 98.3 96 17 146/93 (110) 97   


 


7/16/19 16:13 100.9 90 19 137/84 (101) 98   

















Intake and Output  


 


 7/16/19 7/17/19





 19:00 07:00


 


Intake Total 1177 ml 1913.0 ml


 


Output Total 900 ml 600 ml


 


Balance 277 ml 1313.0 ml


 


  


 


Intake Oral 772 ml 1100 ml


 


IV Total 405 ml 813.0 ml


 


Output Urine Total 900 ml 600 ml


 


# Voids 3 7


 


# Bowel Movements 1 2











Current Medications








 Medications


  (Trade)  Dose


 Ordered  Sig/Herlinda


 Route


 PRN Reason  Start Time


 Stop Time Status Last Admin


Dose Admin


 


 Acetaminophen


  (Tylenol)  650 mg  Q4H  PRN


 ORAL


 fever  7/14/19 08:15


 8/13/19 08:14  7/15/19 00:52


 


 


 Albuterol/


 Ipratropium


  (Albuterol/


 Ipratropium)  3 ml  Q4H  PRN


 HHN


 Shortness of Breath  7/14/19 08:15


 7/19/19 08:14   


 


 


 Cefepime HCl 1 gm/


 Dextrose  55 ml @ 


 110 mls/hr  EVERY 12  HOURS


 IVPB


   7/16/19 09:00


 7/21/19 10:00  7/17/19 08:26


 


 


 Dextrose


  (Dextrose 50%)  25 ml  Q30M  PRN


 IV


 Hypoglycemia  7/14/19 08:30


 8/13/19 08:16   


 


 


 Dextrose


  (Dextrose 50%)  50 ml  Q30M  PRN


 IV


 hypoglycemia  7/14/19 08:30


 8/13/19 08:29   


 


 


 Dextrose/


 Electrolytes  1,000 ml @ 


 50 mls/hr  Q20H


 IV


   7/15/19 10:00


 8/13/19 09:59  7/17/19 01:23


 


 


 Docusate Sodium


  (Colace)  100 mg  THREE TIMES A  DAY


 ORAL


   7/14/19 18:00


 8/13/19 17:59  7/17/19 12:19


 


 


 Folic Acid


  (Folate)  3 mg  DAILY


 ORAL


   7/15/19 09:15


 8/14/19 09:14  7/17/19 08:27


 


 


 Heparin Sodium


  (Porcine)


  (Heparin 5000


 units/ml)  5,000 units  EVERY 12  HOURS


 SUBQ


   7/14/19 09:00


 8/13/19 08:59  7/17/19 08:32


 


 


 Hydromorphone HCl


  (Dilaudid)  1 mg  Q4H  PRN


 IVP


 Severe Pain (Pain Scale 7-10)  7/15/19 22:15


 7/22/19 22:14  7/17/19 10:40


 


 


 Nitroglycerin


  (Ntg)  0.4 mg  Q5M  PRN


 SL


 Prn Chest Pain  7/14/19 08:15


 8/13/19 08:14   


 


 


 Ondansetron HCl


  (Zofran)  4 mg  Q6H  PRN


 IVP


 Nausea & Vomiting  7/14/19 08:15


 8/13/19 08:14   


 


 


 Pantoprazole


  (Protonix)  40 mg  EVERY 12  HOURS


 ORAL


   7/14/19 21:00


 8/13/19 20:59  7/17/19 08:26


 


 


 Polyethylene


 Glycol


  (Miralax)  17 gm  DAILYPRN  PRN


 ORAL


 Constipation  7/14/19 08:15


 8/13/19 08:14  7/14/19 11:32


 


 


 Temazepam


  (Restoril)  15 mg  HSPRN  PRN


 ORAL


 Insomnia  7/14/19 08:15


 7/21/19 08:14   


 


 


 Vancomycin HCl


  (Vanco rx to


 dose)  1 ea  DAILY  PRN


 MISC


 .  7/14/19 08:30


 8/13/19 08:29   


 


 


 Vancomycin HCl 1


 gm/Dextrose  275 ml @ 


 183.3 mls/


 hr  Q8H


 IVPB


   7/16/19 21:00


 7/21/19 20:59  7/17/19 12:17


 





Laboratory Tests


7/16/19 19:15: Vancomycin Level Trough 0.8L


7/17/19 04:40: 


White Blood Count 11.4H, Red Blood Count 3.79L, Hemoglobin 11.6L, Hematocrit 

34.8L, Mean Corpuscular Volume 92, Mean Corpuscular Hemoglobin 30.5, Mean 

Corpuscular Hemoglobin Concent 33.3, Red Cell Distribution Width 12.3, Platelet 

Count 296, Mean Platelet Volume 5.1L, Neutrophils (%) (Auto) 65.6, Lymphocytes (

%) (Auto) 18.3L, Monocytes (%) (Auto) 15.0H, Eosinophils (%) (Auto) 0.5, 

Basophils (%) (Auto) 0.6, Sodium Level 133L, Potassium Level 3.5, Chloride 

Level 99, Carbon Dioxide Level 28, Anion Gap 6, Blood Urea Nitrogen 8, 

Creatinine 0.8, Estimat Glomerular Filtration Rate > 60, Glucose Level 103, 

Calcium Level 8.7, Phosphorus Level 3.3, Magnesium Level 1.9, Total Bilirubin 

0.5, Aspartate Amino Transf (AST/SGOT) 28, Alanine Aminotransferase (ALT/SGPT) 

19, Alkaline Phosphatase 89, C-Reactive Protein, Quantitative 12.6H, Pro-B-Type 

Natriuretic Peptide 742H, Total Protein 6.6, Albumin 2.1L, Globulin 4.5, Albumin

/Globulin Ratio 0.5L


Height (Feet):  6


Height (Inches):  1.00


Weight (Pounds):  169


General Appearance:  no apparent distress


Objective


no change











Manuel Norton MD Jul 17, 2019 12:25

## 2019-07-17 NOTE — PULMONOLOGY PROGRESS NOTE
Assessment/Plan


Problems:  


(1) Sepsis


(2) Cellulitis of left foot


Assessment/Plan


improving


MRI of ankle reviewed


on abx


check cultures


wound care


dvt prophylaxis.





Subjective


ROS Limited/Unobtainable:  No


Constitutional:  Reports: no symptoms


HEENT:  Repors: no symptoms


Respiratory:  Reports: no symptoms


Allergies:  


Coded Allergies:  


     No Known Allergies (Unverified , 7/13/19)





Objective





Last 24 Hour Vital Signs








  Date Time  Temp Pulse Resp B/P (MAP) Pulse Ox O2 Delivery O2 Flow Rate FiO2


 


7/17/19 11:51 98.4 83 18 127/79 (95) 96   


 


7/17/19 11:10 97.9       


 


7/17/19 09:00      Room Air  


 


7/17/19 08:00 97.9 89 18 134/77 (96) 97   


 


7/17/19 04:00 98.5 83 18 124/52 (76) 97   


 


7/17/19 00:00 98.2 95 17 139/85 (103) 97   


 


7/16/19 21:00      Room Air  


 


7/16/19 20:03  88 18  97 Room Air  21


 


7/16/19 20:00 98.3 96 17 146/93 (110) 97   


 


7/16/19 16:13 100.9 90 19 137/84 (101) 98   

















Intake and Output  


 


 7/16/19 7/17/19





 19:00 07:00


 


Intake Total 1177 ml 1913.0 ml


 


Output Total 900 ml 600 ml


 


Balance 277 ml 1313.0 ml


 


  


 


Intake Oral 772 ml 1100 ml


 


IV Total 405 ml 813.0 ml


 


Output Urine Total 900 ml 600 ml


 


# Voids 3 7


 


# Bowel Movements 1 2








General Appearance:  WD/WN


HEENT:  normocephalic, anicteric


Respiratory/Chest:  lungs clear, no accessory muscle use


Cardiovascular:  normal rate, regular rhythm


Abdomen:  soft, non tender, no organomegaly


Extremities:  no cyanosis


Skin:  other - getting better


Laboratory Tests


7/16/19 19:15: Vancomycin Level Trough 0.8L


7/17/19 04:40: 


White Blood Count 11.4H, Red Blood Count 3.79L, Hemoglobin 11.6L, Hematocrit 

34.8L, Mean Corpuscular Volume 92, Mean Corpuscular Hemoglobin 30.5, Mean 

Corpuscular Hemoglobin Concent 33.3, Red Cell Distribution Width 12.3, Platelet 

Count 296, Mean Platelet Volume 5.1L, Neutrophils (%) (Auto) 65.6, Lymphocytes (

%) (Auto) 18.3L, Monocytes (%) (Auto) 15.0H, Eosinophils (%) (Auto) 0.5, 

Basophils (%) (Auto) 0.6, Sodium Level 133L, Potassium Level 3.5, Chloride 

Level 99, Carbon Dioxide Level 28, Anion Gap 6, Blood Urea Nitrogen 8, 

Creatinine 0.8, Estimat Glomerular Filtration Rate > 60, Glucose Level 103, 

Calcium Level 8.7, Phosphorus Level 3.3, Magnesium Level 1.9, Total Bilirubin 

0.5, Aspartate Amino Transf (AST/SGOT) 28, Alanine Aminotransferase (ALT/SGPT) 

19, Alkaline Phosphatase 89, C-Reactive Protein, Quantitative 12.6H, Pro-B-Type 

Natriuretic Peptide 742H, Total Protein 6.6, Albumin 2.1L, Globulin 4.5, Albumin

/Globulin Ratio 0.5L





Current Medications








 Medications


  (Trade)  Dose


 Ordered  Sig/Herlinda


 Route


 PRN Reason  Start Time


 Stop Time Status Last Admin


Dose Admin


 


 Acetaminophen


  (Tylenol)  650 mg  Q4H  PRN


 ORAL


 fever  7/14/19 08:15


 8/13/19 08:14  7/15/19 00:52


 


 


 Albuterol/


 Ipratropium


  (Albuterol/


 Ipratropium)  3 ml  Q4H  PRN


 HHN


 Shortness of Breath  7/14/19 08:15


 7/19/19 08:14   


 


 


 Cefepime HCl 1 gm/


 Dextrose  55 ml @ 


 110 mls/hr  EVERY 12  HOURS


 IVPB


   7/16/19 09:00


 7/21/19 10:00  7/17/19 08:26


 


 


 Dextrose


  (Dextrose 50%)  25 ml  Q30M  PRN


 IV


 Hypoglycemia  7/14/19 08:30


 8/13/19 08:16   


 


 


 Dextrose


  (Dextrose 50%)  50 ml  Q30M  PRN


 IV


 hypoglycemia  7/14/19 08:30


 8/13/19 08:29   


 


 


 Dextrose/


 Electrolytes  1,000 ml @ 


 50 mls/hr  Q20H


 IV


   7/15/19 10:00


 8/13/19 09:59  7/17/19 01:23


 


 


 Docusate Sodium


  (Colace)  100 mg  THREE TIMES A  DAY


 ORAL


   7/14/19 18:00


 8/13/19 17:59  7/17/19 12:19


 


 


 Folic Acid


  (Folate)  3 mg  DAILY


 ORAL


   7/15/19 09:15


 8/14/19 09:14  7/17/19 08:27


 


 


 Heparin Sodium


  (Porcine)


  (Heparin 5000


 units/ml)  5,000 units  EVERY 12  HOURS


 SUBQ


   7/14/19 09:00


 8/13/19 08:59  7/17/19 08:32


 


 


 Hydromorphone HCl


  (Dilaudid)  1 mg  Q4H  PRN


 IVP


 Severe Pain (Pain Scale 7-10)  7/15/19 22:15


 7/22/19 22:14  7/17/19 10:40


 


 


 Nitroglycerin


  (Ntg)  0.4 mg  Q5M  PRN


 SL


 Prn Chest Pain  7/14/19 08:15


 8/13/19 08:14   


 


 


 Ondansetron HCl


  (Zofran)  4 mg  Q6H  PRN


 IVP


 Nausea & Vomiting  7/14/19 08:15


 8/13/19 08:14   


 


 


 Pantoprazole


  (Protonix)  40 mg  EVERY 12  HOURS


 ORAL


   7/14/19 21:00


 8/13/19 20:59  7/17/19 08:26


 


 


 Polyethylene


 Glycol


  (Miralax)  17 gm  DAILYPRN  PRN


 ORAL


 Constipation  7/14/19 08:15


 8/13/19 08:14  7/14/19 11:32


 


 


 Temazepam


  (Restoril)  15 mg  HSPRN  PRN


 ORAL


 Insomnia  7/14/19 08:15


 7/21/19 08:14   


 


 


 Vancomycin HCl


  (Vanco rx to


 dose)  1 ea  DAILY  PRN


 MISC


 .  7/14/19 08:30


 8/13/19 08:29   


 


 


 Vancomycin HCl 1


 gm/Dextrose  275 ml @ 


 183.3 mls/


 hr  Q8H


 IVPB


   7/16/19 21:00


 7/21/19 20:59  7/17/19 12:17


 

















Pia Coe MD Jul 17, 2019 12:25

## 2019-07-17 NOTE — NUR
NURSE NOTES:

Pt resting in bed. A/O x 4, calm, pain level 3/10 on left ankle. LLE elevated on pillows, 
dressing on, no drainage, redness and swelling noted, able to move toes, N/V intact. 
Voiding. IVF infusing. call light within reach, bed in low position, bed alarm on . fall 
precaution maintained. will continue to monitor.

## 2019-07-17 NOTE — GENERAL PROGRESS NOTE
Assessment/Plan


Problem List:  


(1) Sepsis


ICD Codes:  A41.9 - Sepsis, unspecified organism


SNOMED:  29483213


(2) Cellulitis of left foot


ICD Codes:  L03.116 - Cellulitis of left lower limb


SNOMED:  599048940


Status:  stable, progressing


Assessment/Plan:


wound care abx pain control cbc bmp am  dc plan snf





Subjective


Constitutional:  Reports: weakness


Allergies:  


Coded Allergies:  


     No Known Allergies (Unverified , 7/13/19)


All Systems:  reviewed and negative except above


Subjective


sleepy calm





Objective





Last 24 Hour Vital Signs








  Date Time  Temp Pulse Resp B/P (MAP) Pulse Ox O2 Delivery O2 Flow Rate FiO2


 


7/17/19 06:04 98.2       


 


7/17/19 00:00 98.2 95 17 139/85 (103) 97   


 


7/16/19 21:00      Room Air  


 


7/16/19 20:03  88 18  97 Room Air  21


 


7/16/19 20:00 98.3 96 17 146/93 (110) 97   


 


7/16/19 16:13 100.9 90 19 137/84 (101) 98   


 


7/16/19 12:00 97.4 53 19 129/82 (98) 98   


 


7/16/19 09:00      Room Air  


 


7/16/19 08:00 99.0 90 18 129/75 (93) 98   


 


7/16/19 07:05  86 15  95 Room Air  21

















Intake and Output  


 


 7/16/19 7/17/19





 19:00 07:00


 


Intake Total 1177 ml 1530.0 ml


 


Output Total 900 ml 600 ml


 


Balance 277 ml 930.0 ml


 


  


 


Intake Oral 772 ml 1100 ml


 


IV Total 405 ml 430.0 ml


 


Output Urine Total 900 ml 600 ml


 


# Voids 3 7


 


# Bowel Movements 1 2








Laboratory Tests


7/16/19 19:15: Vancomycin Level Trough 0.8L


7/17/19 04:40: 


White Blood Count 11.4H, Red Blood Count 3.79L, Hemoglobin 11.6L, Hematocrit 

34.8L, Mean Corpuscular Volume 92, Mean Corpuscular Hemoglobin 30.5, Mean 

Corpuscular Hemoglobin Concent 33.3, Red Cell Distribution Width 12.3, Platelet 

Count 296, Mean Platelet Volume 5.1L, Neutrophils (%) (Auto) 65.6, Lymphocytes (

%) (Auto) 18.3L, Monocytes (%) (Auto) 15.0H, Eosinophils (%) (Auto) 0.5, 

Basophils (%) (Auto) 0.6, Sodium Level 133L, Potassium Level 3.5, Chloride 

Level 99, Carbon Dioxide Level 28, Anion Gap 6, Blood Urea Nitrogen 8, 

Creatinine 0.8, Estimat Glomerular Filtration Rate > 60, Glucose Level 103, 

Calcium Level 8.7, Phosphorus Level 3.3, Magnesium Level 1.9, Total Bilirubin 

0.5, Aspartate Amino Transf (AST/SGOT) 28, Alanine Aminotransferase (ALT/SGPT) 

19, Alkaline Phosphatase 89, C-Reactive Protein, Quantitative 12.6H, Pro-B-Type 

Natriuretic Peptide 742H, Total Protein 6.6, Albumin 2.1L, Globulin 4.5, Albumin

/Globulin Ratio 0.5L


Height (Feet):  6


Height (Inches):  1.00


Weight (Pounds):  169


General Appearance:  lethargic


EENT:  normal ENT inspection


Neck:  non-tender


Cardiovascular:  normal peripheral pulses, normal rate, regular rhythm


Respiratory/Chest:  chest wall non-tender, lungs clear, normal breath sounds


Abdomen:  normal bowel sounds, non tender, soft


Extremities:  normal inspection


Edema:  no edema noted Arm (L), no edema noted Arm (R), no edema noted Leg (L), 

no edema noted Leg (R), no edema noted Pedal (L), no edema noted Pedal (R), no 

edema noted Generalized


Neurologic:  motor weakness


Skin:  normal pigmentation, warm/dry


Objective


l foor sl red and swollen up to ankle











Freddy Muñiz DO Jul 17, 2019 07:02

## 2019-07-17 NOTE — NUR
NURSE NOTES:

Pt is in bed, awake and verbal. No acute distress noted. Left foot appears swollen and red.  
Vitals stable. Pain medication given as ordered PRN. Bed locked low in position side rails 
up and call light within reach. Pt will be monitored.

## 2019-07-17 NOTE — NUR
SI:LEFT FOOT CELLULITIS

VS: /77, P 80, T 98.0, RR 18, SpO2 96

WBC 11.4, RBC 3.79, H&H 11.6/34.8, Na 133



IS:DILAUDID 1mg IVP

MOTRIN 600mg

VANCOMYCIN 275ml IVPB

HEPARIN SUBQ

CEFEPIME 55ml IVPB

D5/ELECTROLYTES x1L IV



PLAN:  CONT. CEFEPIME & VANCO



*****************MED/SURG STATUS*****************

## 2019-07-17 NOTE — NUR
HOMELESS COORDINATOR 



HC spoke with patient and patient is alert and oriented. Patient does not have a contact 
number. Patient states he is chronically homeless and does want resources for shelter. 
Patient contributes his homelessness to his mail being mixed up  and unable to pay rent. 
Patient states he has been homeless for 2 years. Patient has a , Kobi 
(cousin) [phone]. Patient states his cousin is aware of his whereabouts and there's no 
need to contact him. Patient states he receives 1,000 in SSI a month. patient states he is 
not using any Substance Abuse but tested positive for meth. patient refuses resources for 
Substance Abuse. Patient denied any mental health disorders and refuses resources. Patient 
does state he suffers from depression and is currently only taking medication for back pain. 
Patient states he does not want to return to pervious living condition. HC will try to find 
placement options and provided shelter resources.  Patient is scheduled for an appointment 
Aug 6,19 @ 10:45  [facility] Address: [address] [place] [address] [address], [address] [phone]



Patient continues to require medical intervention. Will continue to monitor and assist as 
needed.

## 2019-07-17 NOTE — NUR
*-* INSURANCE *-*



ALL CLINICALS AND REVIEWS HAVE BEEN FAXED TO:



[facility]: KATHERYN

P-  [phone]

F ............REVIEW/CLINICAL

## 2019-07-17 NOTE — NUR
RD ASSESSMENT & RECOMMENDATIONS

SEE CARE ACTIVITY FOR COMPLETE ASSESSMENT



DAILY ESTIMATED NEEDS:

Needs based on Wound/ 75.5kg 

25-30  kcals/kg 

7815-7061  total kcals

1.25-1.5  g protein/kg

  g total protein 

25-30  mL/kg

1501-4846  total fluid mLs



NUTRITION DIAGNOSIS:

Increased protein intake needs R/T wound healing as evidenced by pt

admitted w/ traumatic ulceration to the left shin.





CURRENT DIET:CCHO MED    



 



PO DIET RECOMMENDATIONS:

Maintain CCHO Med diet 



 



ADDITIONAL RECOMMENDATIONS:

* Standing wt for accurate CBW 

* Wound healing: add MVI x 1, Vit C 250mg QD 

                      : add Fabian 1pkt BID 

* Monitor BGs closely, need for hypoglycemics: A1C of 6.4 

       -> maintain carb controlled diet

## 2019-07-18 VITALS — DIASTOLIC BLOOD PRESSURE: 73 MMHG | SYSTOLIC BLOOD PRESSURE: 114 MMHG

## 2019-07-18 VITALS — DIASTOLIC BLOOD PRESSURE: 76 MMHG | SYSTOLIC BLOOD PRESSURE: 122 MMHG

## 2019-07-18 VITALS — SYSTOLIC BLOOD PRESSURE: 105 MMHG | DIASTOLIC BLOOD PRESSURE: 63 MMHG

## 2019-07-18 VITALS — DIASTOLIC BLOOD PRESSURE: 72 MMHG | SYSTOLIC BLOOD PRESSURE: 117 MMHG

## 2019-07-18 VITALS — SYSTOLIC BLOOD PRESSURE: 114 MMHG | DIASTOLIC BLOOD PRESSURE: 69 MMHG

## 2019-07-18 VITALS — DIASTOLIC BLOOD PRESSURE: 70 MMHG | SYSTOLIC BLOOD PRESSURE: 120 MMHG

## 2019-07-18 LAB
ADD MANUAL DIFF: NO
ANION GAP SERPL CALC-SCNC: 6 MMOL/L (ref 5–15)
BASOPHILS NFR BLD AUTO: 0.8 % (ref 0–2)
BUN SERPL-MCNC: 13 MG/DL (ref 7–18)
CALCIUM SERPL-MCNC: 8.8 MG/DL (ref 8.5–10.1)
CHLORIDE SERPL-SCNC: 103 MMOL/L (ref 98–107)
CO2 SERPL-SCNC: 32 MMOL/L (ref 21–32)
CREAT SERPL-MCNC: 0.8 MG/DL (ref 0.55–1.3)
EOSINOPHIL NFR BLD AUTO: 1.5 % (ref 0–3)
ERYTHROCYTE [DISTWIDTH] IN BLOOD BY AUTOMATED COUNT: 12.5 % (ref 11.6–14.8)
HCT VFR BLD CALC: 37.3 % (ref 42–52)
HGB BLD-MCNC: 12.1 G/DL (ref 14.2–18)
LYMPHOCYTES NFR BLD AUTO: 21.6 % (ref 20–45)
MCV RBC AUTO: 94 FL (ref 80–99)
MONOCYTES NFR BLD AUTO: 14.6 % (ref 1–10)
NEUTROPHILS NFR BLD AUTO: 61.6 % (ref 45–75)
PLATELET # BLD: 369 K/UL (ref 150–450)
POTASSIUM SERPL-SCNC: 4 MMOL/L (ref 3.5–5.1)
RBC # BLD AUTO: 3.98 M/UL (ref 4.7–6.1)
SODIUM SERPL-SCNC: 140 MMOL/L (ref 136–145)
WBC # BLD AUTO: 8.3 K/UL (ref 4.8–10.8)

## 2019-07-18 RX ADMIN — DIPHENHYDRAMINE HYDROCHLORIDE PRN MG: 50 INJECTION INTRAMUSCULAR; INTRAVENOUS at 10:29

## 2019-07-18 RX ADMIN — SODIUM CHLORIDE SCH MLS/HR: 9 INJECTION, SOLUTION INTRAVENOUS at 23:34

## 2019-07-18 RX ADMIN — SODIUM CHLORIDE SCH MLS/HR: 0.9 INJECTION INTRAVENOUS at 08:10

## 2019-07-18 RX ADMIN — HEPARIN SODIUM SCH UNITS: 5000 INJECTION INTRAVENOUS; SUBCUTANEOUS at 08:02

## 2019-07-18 RX ADMIN — DOCUSATE SODIUM SCH MG: 100 CAPSULE, LIQUID FILLED ORAL at 12:13

## 2019-07-18 RX ADMIN — DIPHENHYDRAMINE HYDROCHLORIDE PRN MG: 50 INJECTION INTRAMUSCULAR; INTRAVENOUS at 20:46

## 2019-07-18 RX ADMIN — DOCUSATE SODIUM SCH MG: 100 CAPSULE, LIQUID FILLED ORAL at 17:07

## 2019-07-18 RX ADMIN — DIPHENHYDRAMINE HYDROCHLORIDE PRN MG: 50 INJECTION INTRAMUSCULAR; INTRAVENOUS at 14:48

## 2019-07-18 RX ADMIN — DIPHENHYDRAMINE HYDROCHLORIDE PRN MG: 50 INJECTION INTRAMUSCULAR; INTRAVENOUS at 06:14

## 2019-07-18 RX ADMIN — DOCUSATE SODIUM SCH MG: 100 CAPSULE, LIQUID FILLED ORAL at 08:00

## 2019-07-18 RX ADMIN — SODIUM CHLORIDE SCH MLS/HR: 0.9 INJECTION INTRAVENOUS at 20:45

## 2019-07-18 RX ADMIN — SODIUM CHLORIDE SCH MLS/HR: 9 INJECTION, SOLUTION INTRAVENOUS at 06:13

## 2019-07-18 RX ADMIN — SODIUM CHLORIDE SCH MLS/HR: 9 INJECTION, SOLUTION INTRAVENOUS at 12:15

## 2019-07-18 RX ADMIN — HEPARIN SODIUM SCH UNITS: 5000 INJECTION INTRAVENOUS; SUBCUTANEOUS at 20:56

## 2019-07-18 RX ADMIN — DIPHENHYDRAMINE HYDROCHLORIDE PRN MG: 50 INJECTION INTRAMUSCULAR; INTRAVENOUS at 00:52

## 2019-07-18 NOTE — NUR
NURSE NOTES:

Dr. Sinclair's office is callewd left message with abiodun to inform that pt is positive for 
MRSA wound.

## 2019-07-18 NOTE — INFECTIOUS DISEASES PROG NOTE
Assessment/Plan


Assessment/Plan





Assessment/Plan:


60 yo male who presnted to the ED on 7/12/19 with left leg cellulitis.





Left foot cellulitis


    -wound cx: MRSA, GAS, P.  mirabilsi (Gonzalez S), C. freundi (R ancef, otherwise 

S), K. oxytoca (R amp, otherwise S)


    Leukocytosis ;SP


    Low grade fevers; improving


       -Tibia/fibula MRI: Negative for evidence of osteomyelitis. Evidence of 

superficial soft tissue ulcer, marked by a marker at the anteromedial shin 

region.Considerable subcutaneous fat edema. Given stated clinical history, 

probably on the basis of cellulitis. Correlate with clinical findings. No 

evidence of drainable abscess.


      -foot MRI:  Extensive soft tissue edema, as described. This is in keeping 

with stated clinical history of cellulitis. No findings to suggest abscess Very 

superficial fluid collections likely relate to stated clinical history of 

dorsal blisters. No marrow abnormality to suggest acute osteomyelitis 

demonstrated.


      -ANkle MRI:  Patchy areas of marrow edema, as described. Periarticular 

distribution of this is suggestive of arthropathy which may be degenerative, 

inflammatory, or, most likely, on the basis of Charcot-type changes. Per 

technologist, there are no ulcers in the area so osteomyelitis is deemed much 

less likely. Extensive edema of the subcutaneous fat. Given stated clinical 

history of cellulitis most likely on the basis of such. Correlate with clinical 

findings. No drainable


abscess collection demonstrated.


 


        -Foot xray: No acute injury identified. Soft tissue swelling. Multiple 

incidental findings as described











PLAN:





 - Continue Cefepime #5and Vancomycin #5


   -ok to discharge on PO Bactrim DS 1 tab bid and Augmentin 875/125mg PO bid 

for 9 more days





 - f/u wound cx


 - Monitor CBC and Temps


 -Podiatry f/u





Thank you for this consult. We will continue to follow the patient during this 

hospitalization.





Subjective


Allergies:  


Coded Allergies:  


     No Known Allergies (Unverified , 7/13/19)


Subjective


afebrile >36hrs


WBC improving


BCx NTD





Objective


Vital Signs





Last 24 Hour Vital Signs








  Date Time  Temp Pulse Resp B/P (MAP) Pulse Ox O2 Delivery O2 Flow Rate FiO2


 


7/18/19 12:00 98.0 80 18 114/69 (84) 98   


 


7/18/19 10:59 98.1       


 


7/18/19 09:00      Room Air  


 


7/18/19 08:30 98.1       


 


7/18/19 07:39 98.1 81 18 114/73 (87) 99   


 


7/18/19 04:00 98.2 82 18 122/76 (91) 98   


 


7/18/19 00:00 98.1 82 18 117/72 (87) 98   


 


7/17/19 21:00      Room Air  


 


7/17/19 20:00 97.9 79 18 105/68 (80) 98   


 


7/17/19 20:00  80 18  95 Room Air  21


 


7/17/19 15:39 98.0 80 18 120/72 (88) 96   


 


7/17/19 14:21 98.4       








Height (Feet):  6


Height (Inches):  1.00


Weight (Pounds):  169


Objective


Gen:  NAD


HEENT: NCAT, MMM, EOMI, PERRL, No Oral lesion, no scleral icterus 


NECK:  full range of motion, supple, no meningismus, No LAD, No JVD


LUNGS:  CTAB, No W/C, No Accessory muscle use


CARDS: RRR, S1, S2, No M/R/G, 


ABD: Soft, NT, ND, No R/G, + BS, No HSM, No Masses


: Deferred


Ext: C/C/E, Pulses 2+ B/L (DP, Rad): LLE swelling with erythema and pain, 

Ulceration present with purulence.


NEURO: A/O x 4, Strength and Sensation Grossly intact


PSYCH: Normal mood and affect


SKIN:  Warm/dry, No rashes





Laboratory Tests








Test


  7/18/19


04:02


 


White Blood Count


  8.3 K/UL


(4.8-10.8)


 


Red Blood Count


  3.98 M/UL


(4.70-6.10)  L


 


Hemoglobin


  12.1 G/DL


(14.2-18.0)  L


 


Hematocrit


  37.3 %


(42.0-52.0)  L


 


Mean Corpuscular Volume 94 FL (80-99)  


 


Mean Corpuscular Hemoglobin


  30.5 PG


(27.0-31.0)


 


Mean Corpuscular Hemoglobin


Concent 32.6 G/DL


(32.0-36.0)


 


Red Cell Distribution Width


  12.5 %


(11.6-14.8)


 


Platelet Count


  369 K/UL


(150-450)


 


Mean Platelet Volume


  5.6 FL


(6.5-10.1)  L


 


Neutrophils (%) (Auto)


  61.6 %


(45.0-75.0)


 


Lymphocytes (%) (Auto)


  21.6 %


(20.0-45.0)


 


Monocytes (%) (Auto)


  14.6 %


(1.0-10.0)  H


 


Eosinophils (%) (Auto)


  1.5 %


(0.0-3.0)


 


Basophils (%) (Auto)


  0.8 %


(0.0-2.0)


 


Sodium Level


  140 MMOL/L


(136-145)


 


Potassium Level


  4.0 MMOL/L


(3.5-5.1)


 


Chloride Level


  103 MMOL/L


()


 


Carbon Dioxide Level


  32 MMOL/L


(21-32)


 


Anion Gap


  6 mmol/L


(5-15)


 


Blood Urea Nitrogen


  13 mg/dL


(7-18)


 


Creatinine


  0.8 MG/DL


(0.55-1.30)


 


Estimat Glomerular Filtration


Rate > 60 mL/min


(>60)


 


Glucose Level


  81 MG/DL


()


 


Calcium Level


  8.8 MG/DL


(8.5-10.1)


 


Vancomycin Level Trough


  12.2 ug/mL


(5.0-12.0)  H











Current Medications








 Medications


  (Trade)  Dose


 Ordered  Sig/Herlinda


 Route


 PRN Reason  Start Time


 Stop Time Status Last Admin


Dose Admin


 


 Acetaminophen


  (Tylenol)  650 mg  Q4H  PRN


 ORAL


 fever  7/14/19 08:15


 8/13/19 08:14  7/15/19 00:52


 


 


 Albuterol/


 Ipratropium


  (Albuterol/


 Ipratropium)  3 ml  Q4H  PRN


 HHN


 Shortness of Breath  7/14/19 08:15


 7/19/19 08:14   


 


 


 Cefepime HCl 1 gm/


 Dextrose  55 ml @ 


 110 mls/hr  EVERY 12  HOURS


 IVPB


   7/16/19 09:00


 7/21/19 10:00  7/18/19 08:10


 


 


 Dextrose


  (Dextrose 50%)  25 ml  Q30M  PRN


 IV


 Hypoglycemia  7/14/19 08:30


 8/13/19 08:16   


 


 


 Dextrose


  (Dextrose 50%)  50 ml  Q30M  PRN


 IV


 hypoglycemia  7/14/19 08:30


 8/13/19 08:29   


 


 


 Docusate Sodium


  (Colace)  100 mg  THREE TIMES A  DAY


 ORAL


   7/14/19 18:00


 8/13/19 17:59  7/18/19 12:13


 


 


 Folic Acid


  (Folate)  3 mg  DAILY


 ORAL


   7/15/19 09:15


 8/14/19 09:14  7/18/19 08:01


 


 


 Heparin Sodium


  (Porcine)


  (Heparin 5000


 units/ml)  5,000 units  EVERY 12  HOURS


 SUBQ


   7/14/19 09:00


 8/13/19 08:59  7/18/19 08:02


 


 


 Hydromorphone HCl


  (Dilaudid)  1 mg  Q4H  PRN


 IVP


 Severe Pain (Pain Scale 7-10)  7/15/19 22:15


 7/22/19 22:14  7/18/19 10:29


 


 


 Ibuprofen


  (Motrin)  600 mg  TID


 ORAL


   7/17/19 18:00


 8/16/19 17:59  7/18/19 12:13


 


 


 Nitroglycerin


  (Ntg)  0.4 mg  Q5M  PRN


 SL


 Prn Chest Pain  7/14/19 08:15


 8/13/19 08:14   


 


 


 Ondansetron HCl


  (Zofran)  4 mg  Q6H  PRN


 IVP


 Nausea & Vomiting  7/14/19 08:15


 8/13/19 08:14   


 


 


 Pantoprazole


  (Protonix)  40 mg  EVERY 12  HOURS


 ORAL


   7/14/19 21:00


 8/13/19 20:59  7/18/19 08:00


 


 


 Polyethylene


 Glycol


  (Miralax)  17 gm  DAILYPRN  PRN


 ORAL


 Constipation  7/14/19 08:15


 8/13/19 08:14  7/14/19 11:32


 


 


 Potassium Chloride


  (K-Dur)  40 meq  DAILY


 ORAL


   7/18/19 09:00


 8/17/19 08:59  7/18/19 08:13


 


 


 Temazepam


  (Restoril)  15 mg  HSPRN  PRN


 ORAL


 Insomnia  7/14/19 08:15


 7/21/19 08:14  7/17/19 20:21


 


 


 Vancomycin HCl


  (Vanco rx to


 dose)  1 ea  DAILY  PRN


 MISC


 .  7/14/19 08:30


 8/13/19 08:29   


 


 


 Vancomycin HCl 1


 gm/Dextrose  275 ml @ 


 183.3 mls/


 hr  Q8H


 IVPB


   7/16/19 21:00


 7/21/19 20:59  7/18/19 12:15


 

















Sarah Sinclair M.D. Jul 18, 2019 12:27

## 2019-07-18 NOTE — NUR
NURSE NOTES:

Pt is in bed awake. Microbiology lab called to inform that pt is positive for MRSA wounds. 
Pi is in contact isolation now.

## 2019-07-18 NOTE — NEPHROLOGY PROGRESS NOTE
Assessment/Plan


Problem List:  


(1) Cellulitis of left foot


(2) Sepsis


(3) Hyponatremia


(4) Hypoalbuminemia


Assessment


Left foot cellulitis / Leukocytosis  / No fever


Low Na


Low K


Anemia


High Lactic


Low Albumin


Plan








recheck BMI now 22.03


Isotonic solution


K supplement


DC IV fluids


monitor lytes


avoid nephrotoxics


urine tox screen positive for Amphetamines and Opiates


per orders


stable from renal stand if discharged





Subjective


ROS Limited/Unobtainable:  No





Objective


Objective





Last 24 Hour Vital Signs








  Date Time  Temp Pulse Resp B/P (MAP) Pulse Ox O2 Delivery O2 Flow Rate FiO2


 


7/18/19 15:18 98.1       


 


7/18/19 13:56  84 18  96 Room Air  21


 


7/18/19 12:43 98.1       


 


7/18/19 12:00 98.0 80 18 114/69 (84) 98   


 


7/18/19 09:00      Room Air  


 


7/18/19 07:39 98.1 81 18 114/73 (87) 99   


 


7/18/19 04:00 98.2 82 18 122/76 (91) 98   


 


7/18/19 00:00 98.1 82 18 117/72 (87) 98   


 


7/17/19 21:00      Room Air  


 


7/17/19 20:00 97.9 79 18 105/68 (80) 98   


 


7/17/19 20:00  80 18  95 Room Air  21


 


7/17/19 15:39 98.0 80 18 120/72 (88) 96   

















Intake and Output  


 


 7/17/19 7/18/19





 19:00 07:00


 


Intake Total 662.00 ml 1130.0 ml


 


Output Total 500 ml 1000 ml


 


Balance 162.00 ml 130.0 ml


 


  


 


Intake Oral 240 ml 800 ml


 


IV Total 422.00 ml 330.0 ml


 


Output Urine Total 500 ml 1000 ml


 


# Voids 2 3








Laboratory Tests


7/18/19 04:02: 


White Blood Count 8.3, Red Blood Count 3.98L, Hemoglobin 12.1L, Hematocrit 37.3L

, Mean Corpuscular Volume 94, Mean Corpuscular Hemoglobin 30.5, Mean 

Corpuscular Hemoglobin Concent 32.6, Red Cell Distribution Width 12.5, Platelet 

Count 369, Mean Platelet Volume 5.6L, Neutrophils (%) (Auto) 61.6, Lymphocytes (

%) (Auto) 21.6, Monocytes (%) (Auto) 14.6H, Eosinophils (%) (Auto) 1.5, 

Basophils (%) (Auto) 0.8, Sodium Level 140, Potassium Level 4.0, Chloride Level 

103, Carbon Dioxide Level 32, Anion Gap 6, Blood Urea Nitrogen 13, Creatinine 

0.8, Estimat Glomerular Filtration Rate > 60, Glucose Level 81, Calcium Level 

8.8, Vancomycin Level Trough 12.2H


Height (Feet):  6


Height (Inches):  1.00


Weight (Pounds):  169


General Appearance:  no apparent distress


Objective


no change











Manuel Norton MD Jul 18, 2019 15:30

## 2019-07-18 NOTE — GENERAL PROGRESS NOTE
Assessment/Plan


Problem List:  


(1) Sepsis


ICD Codes:  A41.9 - Sepsis, unspecified organism


SNOMED:  67898225


(2) Cellulitis of left foot


ICD Codes:  L03.116 - Cellulitis of left lower limb


SNOMED:  872916870


Status:  stable, progressing


Assessment/Plan:


wound care abx pain control cbc bmp am  dc plan snf





Subjective


Constitutional:  Reports: weakness


Allergies:  


Coded Allergies:  


     No Known Allergies (Unverified , 7/13/19)


All Systems:  reviewed and negative except above


Subjective


sleepy calm





Objective





Last 24 Hour Vital Signs








  Date Time  Temp Pulse Resp B/P (MAP) Pulse Ox O2 Delivery O2 Flow Rate FiO2


 


7/18/19 13:56  84 18  96 Room Air  21


 


7/18/19 12:43 98.1       


 


7/18/19 12:00 98.0 80 18 114/69 (84) 98   


 


7/18/19 10:59 98.1       


 


7/18/19 09:00      Room Air  


 


7/18/19 07:39 98.1 81 18 114/73 (87) 99   


 


7/18/19 04:00 98.2 82 18 122/76 (91) 98   


 


7/18/19 00:00 98.1 82 18 117/72 (87) 98   


 


7/17/19 21:00      Room Air  


 


7/17/19 20:00 97.9 79 18 105/68 (80) 98   


 


7/17/19 20:00  80 18  95 Room Air  21


 


7/17/19 15:39 98.0 80 18 120/72 (88) 96   

















Intake and Output  


 


 7/17/19 7/18/19





 19:00 07:00


 


Intake Total 662.00 ml 1130.0 ml


 


Output Total 500 ml 1000 ml


 


Balance 162.00 ml 130.0 ml


 


  


 


Intake Oral 240 ml 800 ml


 


IV Total 422.00 ml 330.0 ml


 


Output Urine Total 500 ml 1000 ml


 


# Voids 2 3








Laboratory Tests


7/18/19 04:02: 


White Blood Count 8.3, Red Blood Count 3.98L, Hemoglobin 12.1L, Hematocrit 37.3L

, Mean Corpuscular Volume 94, Mean Corpuscular Hemoglobin 30.5, Mean 

Corpuscular Hemoglobin Concent 32.6, Red Cell Distribution Width 12.5, Platelet 

Count 369, Mean Platelet Volume 5.6L, Neutrophils (%) (Auto) 61.6, Lymphocytes (

%) (Auto) 21.6, Monocytes (%) (Auto) 14.6H, Eosinophils (%) (Auto) 1.5, 

Basophils (%) (Auto) 0.8, Sodium Level 140, Potassium Level 4.0, Chloride Level 

103, Carbon Dioxide Level 32, Anion Gap 6, Blood Urea Nitrogen 13, Creatinine 

0.8, Estimat Glomerular Filtration Rate > 60, Glucose Level 81, Calcium Level 

8.8, Vancomycin Level Trough 12.2H


Height (Feet):  6


Height (Inches):  1.00


Weight (Pounds):  169


General Appearance:  lethargic


EENT:  normal ENT inspection


Neck:  normal alignment


Cardiovascular:  normal peripheral pulses, normal rate, regular rhythm


Respiratory/Chest:  chest wall non-tender, lungs clear, normal breath sounds


Abdomen:  normal bowel sounds, non tender, soft


Extremities:  normal inspection


Edema:  no edema noted Arm (L), no edema noted Arm (R), no edema noted Leg (L), 

no edema noted Leg (R), no edema noted Pedal (L), no edema noted Pedal (R), no 

edema noted Generalized


Neurologic:  motor weakness


Skin:  normal pigmentation, warm/dry


Objective


l foor sl red and swollen up to ankle











Freddy Muñiz DO Jul 18, 2019 14:27

## 2019-07-18 NOTE — NUR
NURSE NOTES:

pts awake, A/O x 4, calm, denies pain at this time. no SOB noted. redness/swelling left 
foot, dressing CDI. call light within reach, bed alarm on . will continue to monitor.

## 2019-07-18 NOTE — PULMONOLOGY PROGRESS NOTE
Assessment/Plan


Problems:  


(1) Sepsis


(2) Cellulitis of left foot


Assessment/Plan


improving


MRI of ankle reviewed


on abx


check cultures


wound care


dvt prophylaxis.





Subjective


ROS Limited/Unobtainable:  No


Allergies:  


Coded Allergies:  


     No Known Allergies (Unverified , 7/13/19)





Objective





Last 24 Hour Vital Signs








  Date Time  Temp Pulse Resp B/P (MAP) Pulse Ox O2 Delivery O2 Flow Rate FiO2


 


7/18/19 16:00 98.2 78 18 120/70 (87) 96   


 


7/18/19 15:18 98.1       


 


7/18/19 13:56  84 18  96 Room Air  21


 


7/18/19 12:43 98.1       


 


7/18/19 12:00 98.0 80 18 114/69 (84) 98   


 


7/18/19 09:00      Room Air  


 


7/18/19 07:39 98.1 81 18 114/73 (87) 99   


 


7/18/19 04:00 98.2 82 18 122/76 (91) 98   


 


7/18/19 00:00 98.1 82 18 117/72 (87) 98   


 


7/17/19 21:00      Room Air  


 


7/17/19 20:00 97.9 79 18 105/68 (80) 98   


 


7/17/19 20:00  80 18  95 Room Air  21

















Intake and Output  


 


 7/17/19 7/18/19





 19:00 07:00


 


Intake Total 662.00 ml 1130.0 ml


 


Output Total 500 ml 1000 ml


 


Balance 162.00 ml 130.0 ml


 


  


 


Intake Oral 240 ml 800 ml


 


IV Total 422.00 ml 330.0 ml


 


Output Urine Total 500 ml 1000 ml


 


# Voids 2 3








Objective


General Appearance:  WD/WN


HEENT:  normocephalic, atraumatic


Respiratory/Chest:  chest wall non-tender, lungs clear, normal breath sounds


Breasts:  no masses


Cardiovascular:  normal peripheral pulses, normal rate


Abdomen:  normal bowel sounds, soft, non tender


Genitourinary:  normal external genitalia


Extremities:  no cyanosis, less edema


Laboratory Tests


7/18/19 04:02: 


White Blood Count 8.3, Red Blood Count 3.98L, Hemoglobin 12.1L, Hematocrit 37.3L

, Mean Corpuscular Volume 94, Mean Corpuscular Hemoglobin 30.5, Mean 

Corpuscular Hemoglobin Concent 32.6, Red Cell Distribution Width 12.5, Platelet 

Count 369, Mean Platelet Volume 5.6L, Neutrophils (%) (Auto) 61.6, Lymphocytes (

%) (Auto) 21.6, Monocytes (%) (Auto) 14.6H, Eosinophils (%) (Auto) 1.5, 

Basophils (%) (Auto) 0.8, Sodium Level 140, Potassium Level 4.0, Chloride Level 

103, Carbon Dioxide Level 32, Anion Gap 6, Blood Urea Nitrogen 13, Creatinine 

0.8, Estimat Glomerular Filtration Rate > 60, Glucose Level 81, Calcium Level 

8.8, Vancomycin Level Trough 12.2H





Current Medications








 Medications


  (Trade)  Dose


 Ordered  Sig/Herlinda


 Route


 PRN Reason  Start Time


 Stop Time Status Last Admin


Dose Admin


 


 Acetaminophen


  (Tylenol)  650 mg  Q4H  PRN


 ORAL


 fever  7/14/19 08:15


 8/13/19 08:14  7/15/19 00:52


 


 


 Albuterol/


 Ipratropium


  (Albuterol/


 Ipratropium)  3 ml  Q4H  PRN


 HHN


 Shortness of Breath  7/14/19 08:15


 7/19/19 08:14   


 


 


 Cefepime HCl 1 gm/


 Dextrose  55 ml @ 


 110 mls/hr  EVERY 12  HOURS


 IVPB


   7/16/19 09:00


 7/21/19 10:00  7/18/19 08:10


 


 


 Dextrose


  (Dextrose 50%)  25 ml  Q30M  PRN


 IV


 Hypoglycemia  7/14/19 08:30


 8/13/19 08:16   


 


 


 Dextrose


  (Dextrose 50%)  50 ml  Q30M  PRN


 IV


 hypoglycemia  7/14/19 08:30


 8/13/19 08:29   


 


 


 Docusate Sodium


  (Colace)  100 mg  THREE TIMES A  DAY


 ORAL


   7/14/19 18:00


 8/13/19 17:59  7/18/19 12:13


 


 


 Folic Acid


  (Folate)  3 mg  DAILY


 ORAL


   7/15/19 09:15


 8/14/19 09:14  7/18/19 08:01


 


 


 Heparin Sodium


  (Porcine)


  (Heparin 5000


 units/ml)  5,000 units  EVERY 12  HOURS


 SUBQ


   7/14/19 09:00


 8/13/19 08:59  7/18/19 08:02


 


 


 Hydromorphone HCl


  (Dilaudid)  1 mg  Q4H  PRN


 IVP


 Severe Pain (Pain Scale 7-10)  7/15/19 22:15


 7/22/19 22:14  7/18/19 14:48


 


 


 Ibuprofen


  (Motrin)  600 mg  TID


 ORAL


   7/17/19 18:00


 8/16/19 17:59  7/18/19 12:13


 


 


 Nitroglycerin


  (Ntg)  0.4 mg  Q5M  PRN


 SL


 Prn Chest Pain  7/14/19 08:15


 8/13/19 08:14   


 


 


 Ondansetron HCl


  (Zofran)  4 mg  Q6H  PRN


 IVP


 Nausea & Vomiting  7/14/19 08:15


 8/13/19 08:14   


 


 


 Pantoprazole


  (Protonix)  40 mg  EVERY 12  HOURS


 ORAL


   7/14/19 21:00


 8/13/19 20:59  7/18/19 08:00


 


 


 Polyethylene


 Glycol


  (Miralax)  17 gm  DAILYPRN  PRN


 ORAL


 Constipation  7/14/19 08:15


 8/13/19 08:14  7/14/19 11:32


 


 


 Potassium Chloride


  (K-Dur)  40 meq  DAILY


 ORAL


   7/18/19 09:00


 8/17/19 08:59  7/18/19 08:13


 


 


 Temazepam


  (Restoril)  15 mg  HSPRN  PRN


 ORAL


 Insomnia  7/14/19 08:15


 7/21/19 08:14  7/17/19 20:21


 


 


 Vancomycin HCl


  (Vanco rx to


 dose)  1 ea  DAILY  PRN


 MISC


 .  7/14/19 08:30


 8/13/19 08:29   


 


 


 Vancomycin HCl 1


 gm/Dextrose  275 ml @ 


 183.3 mls/


 hr  Q8H


 IVPB


   7/16/19 21:00


 7/21/19 20:59  7/18/19 12:15


 

















Pia Coe MD Jul 18, 2019 16:54

## 2019-07-18 NOTE — NUR
SI:SEPSIS . CELLULITIS

VS: /70, P 96, T 98.2, RR 18, SpO2 96

RBC 3.98, H&H 12.1/37.3, 



IS:K-DUR 40meq

MOTRIN 600mg

VANCOMYCIN 275ml IVPB

CEFEPIME 55ml IVPB

DILAUDID 1mg IVP

HEPARIN SUBQ



****************MED/SURG STATUS*****************

## 2019-07-18 NOTE — NUR
NURSE NOTES:

Pt is in bed, awake and verbal. No acute distress noted. Left foot appears swollen and red.  
Dressing changed.Vitals stable. Pain medication given as ordered PRN. Bed locked low in 
position side rails up and call light within reach. Pt will be monitored.

## 2019-07-19 VITALS — SYSTOLIC BLOOD PRESSURE: 124 MMHG | DIASTOLIC BLOOD PRESSURE: 75 MMHG

## 2019-07-19 VITALS — DIASTOLIC BLOOD PRESSURE: 73 MMHG | SYSTOLIC BLOOD PRESSURE: 111 MMHG

## 2019-07-19 VITALS — DIASTOLIC BLOOD PRESSURE: 68 MMHG | SYSTOLIC BLOOD PRESSURE: 109 MMHG

## 2019-07-19 VITALS — DIASTOLIC BLOOD PRESSURE: 79 MMHG | SYSTOLIC BLOOD PRESSURE: 119 MMHG

## 2019-07-19 VITALS — SYSTOLIC BLOOD PRESSURE: 117 MMHG | DIASTOLIC BLOOD PRESSURE: 76 MMHG

## 2019-07-19 VITALS — DIASTOLIC BLOOD PRESSURE: 64 MMHG | SYSTOLIC BLOOD PRESSURE: 103 MMHG

## 2019-07-19 LAB
ADD MANUAL DIFF: NO
ANION GAP SERPL CALC-SCNC: 5 MMOL/L (ref 5–15)
BASOPHILS NFR BLD AUTO: 0.6 % (ref 0–2)
BUN SERPL-MCNC: 14 MG/DL (ref 7–18)
CALCIUM SERPL-MCNC: 8.6 MG/DL (ref 8.5–10.1)
CHLORIDE SERPL-SCNC: 103 MMOL/L (ref 98–107)
CO2 SERPL-SCNC: 30 MMOL/L (ref 21–32)
CREAT SERPL-MCNC: 0.8 MG/DL (ref 0.55–1.3)
EOSINOPHIL NFR BLD AUTO: 1.4 % (ref 0–3)
ERYTHROCYTE [DISTWIDTH] IN BLOOD BY AUTOMATED COUNT: 12.7 % (ref 11.6–14.8)
HCT VFR BLD CALC: 37.3 % (ref 42–52)
HGB BLD-MCNC: 11.9 G/DL (ref 14.2–18)
LYMPHOCYTES NFR BLD AUTO: 16.9 % (ref 20–45)
MCV RBC AUTO: 94 FL (ref 80–99)
MONOCYTES NFR BLD AUTO: 9.9 % (ref 1–10)
NEUTROPHILS NFR BLD AUTO: 71.1 % (ref 45–75)
PLATELET # BLD: 423 K/UL (ref 150–450)
POTASSIUM SERPL-SCNC: 3.9 MMOL/L (ref 3.5–5.1)
RBC # BLD AUTO: 3.97 M/UL (ref 4.7–6.1)
SODIUM SERPL-SCNC: 138 MMOL/L (ref 136–145)
WBC # BLD AUTO: 10.3 K/UL (ref 4.8–10.8)

## 2019-07-19 RX ADMIN — SODIUM CHLORIDE SCH MLS/HR: 0.9 INJECTION INTRAVENOUS at 08:41

## 2019-07-19 RX ADMIN — SODIUM CHLORIDE SCH MLS/HR: 9 INJECTION, SOLUTION INTRAVENOUS at 12:27

## 2019-07-19 RX ADMIN — HEPARIN SODIUM SCH UNITS: 5000 INJECTION INTRAVENOUS; SUBCUTANEOUS at 20:59

## 2019-07-19 RX ADMIN — DOCUSATE SODIUM SCH MG: 100 CAPSULE, LIQUID FILLED ORAL at 08:41

## 2019-07-19 RX ADMIN — DOCUSATE SODIUM SCH MG: 100 CAPSULE, LIQUID FILLED ORAL at 17:48

## 2019-07-19 RX ADMIN — DIPHENHYDRAMINE HYDROCHLORIDE PRN MG: 50 INJECTION INTRAMUSCULAR; INTRAVENOUS at 17:48

## 2019-07-19 RX ADMIN — SODIUM CHLORIDE SCH MLS/HR: 9 INJECTION, SOLUTION INTRAVENOUS at 21:56

## 2019-07-19 RX ADMIN — DOCUSATE SODIUM SCH MG: 100 CAPSULE, LIQUID FILLED ORAL at 12:27

## 2019-07-19 RX ADMIN — DIPHENHYDRAMINE HYDROCHLORIDE PRN MG: 50 INJECTION INTRAMUSCULAR; INTRAVENOUS at 21:53

## 2019-07-19 RX ADMIN — SODIUM CHLORIDE SCH MLS/HR: 9 INJECTION, SOLUTION INTRAVENOUS at 05:24

## 2019-07-19 RX ADMIN — DIPHENHYDRAMINE HYDROCHLORIDE PRN MG: 50 INJECTION INTRAMUSCULAR; INTRAVENOUS at 09:41

## 2019-07-19 RX ADMIN — DIPHENHYDRAMINE HYDROCHLORIDE PRN MG: 50 INJECTION INTRAMUSCULAR; INTRAVENOUS at 00:42

## 2019-07-19 RX ADMIN — HEPARIN SODIUM SCH UNITS: 5000 INJECTION INTRAVENOUS; SUBCUTANEOUS at 08:44

## 2019-07-19 RX ADMIN — SODIUM CHLORIDE SCH MLS/HR: 0.9 INJECTION INTRAVENOUS at 20:57

## 2019-07-19 RX ADMIN — DIPHENHYDRAMINE HYDROCHLORIDE PRN MG: 50 INJECTION INTRAMUSCULAR; INTRAVENOUS at 05:23

## 2019-07-19 RX ADMIN — DIPHENHYDRAMINE HYDROCHLORIDE PRN MG: 50 INJECTION INTRAMUSCULAR; INTRAVENOUS at 13:51

## 2019-07-19 NOTE — GENERAL PROGRESS NOTE
Assessment/Plan


Problem List:  


(1) Sepsis


ICD Codes:  A41.9 - Sepsis, unspecified organism


SNOMED:  74525510


(2) Cellulitis of left foot


ICD Codes:  L03.116 - Cellulitis of left lower limb


SNOMED:  544728722


Status:  stable, progressing


Assessment/Plan:


wound care abx pain control cbc bmp am  dc snf if clear





Subjective


Constitutional:  Reports: weakness


Allergies:  


Coded Allergies:  


     No Known Allergies (Unverified , 7/13/19)


All Systems:  reviewed and negative except above


Subjective


sleepy calm





Objective





Last 24 Hour Vital Signs








  Date Time  Temp Pulse Resp B/P (MAP) Pulse Ox O2 Delivery O2 Flow Rate FiO2


 


7/19/19 08:18  80 16  96 Nasal Cannula  28


 


7/19/19 04:00 97.8 78 18 117/76 (90) 100   


 


7/19/19 00:00 98.4 63 17 103/64 (77) 98   


 


7/18/19 21:00      Room Air  


 


7/18/19 20:50  83 18  95 Room Air  21


 


7/18/19 20:00 97.7 83 18 105/63 (77) 98   


 


7/18/19 17:38 98.2       


 


7/18/19 16:00 98.2 78 18 120/70 (87) 96   


 


7/18/19 15:18 98.1       


 


7/18/19 13:56  84 18  96 Room Air  21


 


7/18/19 12:00 98.0 80 18 114/69 (84) 98   


 


7/18/19 09:00      Room Air  

















Intake and Output  


 


 7/18/19 7/19/19





 19:00 07:00


 


Intake Total 903.3 ml 275.0 ml


 


Output Total 1300 ml 450 ml


 


Balance -396.7 ml -175.0 ml


 


  


 


Intake Oral 720 ml 


 


IV Total 183.3 ml 275.0 ml


 


Output Urine Total 1300 ml 450 ml


 


# Voids 3 








Laboratory Tests


7/19/19 05:10: 


White Blood Count 10.3, Red Blood Count 3.97L, Hemoglobin 11.9L, Hematocrit 

37.3L, Mean Corpuscular Volume 94, Mean Corpuscular Hemoglobin 30.1, Mean 

Corpuscular Hemoglobin Concent 32.0, Red Cell Distribution Width 12.7, Platelet 

Count 423, Mean Platelet Volume 4.8L, Neutrophils (%) (Auto) 71.1, Lymphocytes (

%) (Auto) 16.9L, Monocytes (%) (Auto) 9.9, Eosinophils (%) (Auto) 1.4, 

Basophils (%) (Auto) 0.6, Sodium Level 138, Potassium Level 3.9, Chloride Level 

103, Carbon Dioxide Level 30, Anion Gap 5, Blood Urea Nitrogen 14, Creatinine 

0.8, Estimat Glomerular Filtration Rate > 60, Glucose Level 98, Calcium Level 

8.6


Height (Feet):  6


Height (Inches):  1.00


Weight (Pounds):  169


General Appearance:  lethargic


EENT:  normal ENT inspection


Neck:  normal alignment


Cardiovascular:  normal peripheral pulses, normal rate, regular rhythm


Respiratory/Chest:  chest wall non-tender, lungs clear, normal breath sounds


Abdomen:  normal bowel sounds, non tender, soft


Extremities:  normal inspection


Edema:  1+ Arm (L), 1+ Arm (R), 1+ Leg (L), 1+ Leg (R), 1+ Pedal (L), 1+ Pedal (

R), 1+ Generalized


Edema:  trace edema


Neurologic:  motor weakness


Skin:  normal pigmentation, warm/dry


Objective


l foor sl red and swollen up to ankle











Freddy Muñiz DO Jul 19, 2019 08:25

## 2019-07-19 NOTE — NEPHROLOGY PROGRESS NOTE
Assessment/Plan


Problem List:  


(1) Cellulitis of left foot


(2) Sepsis


(3) Hyponatremia


(4) Hypoalbuminemia


Assessment


Left foot cellulitis / Leukocytosis  / No fever


Low Na


Low K


Anemia


High Lactic


Low Albumin


Plan








recheck BMI now 22.03


Isotonic solution


K supplement


DC IV fluids


monitor lytes


avoid nephrotoxics


urine tox screen positive for Amphetamines and Opiates


per orders


stable from renal stand if discharged





Subjective


ROS Limited/Unobtainable:  No


Constitutional:  Reports: malaise





Objective


Objective





Last 24 Hour Vital Signs








  Date Time  Temp Pulse Resp B/P (MAP) Pulse Ox O2 Delivery O2 Flow Rate FiO2


 


7/19/19 10:11 97.8       


 


7/19/19 09:11 97.8       


 


7/19/19 09:00      Room Air  


 


7/19/19 08:18  80 16  96 Nasal Cannula  28


 


7/19/19 08:00 98.0 75 19 109/68 (82) 100   


 


7/19/19 04:00 97.8 78 18 117/76 (90) 100   


 


7/19/19 00:00 98.4 63 17 103/64 (77) 98   


 


7/18/19 21:00      Room Air  


 


7/18/19 20:50  83 18  95 Room Air  21


 


7/18/19 20:00 97.7 83 18 105/63 (77) 98   


 


7/18/19 16:00 98.2 78 18 120/70 (87) 96   


 


7/18/19 13:56  84 18  96 Room Air  21


 


7/18/19 12:00 98.0 80 18 114/69 (84) 98   

















Intake and Output  


 


 7/18/19 7/19/19





 19:00 07:00


 


Intake Total 903.3 ml 275.0 ml


 


Output Total 1300 ml 450 ml


 


Balance -396.7 ml -175.0 ml


 


  


 


Intake Oral 720 ml 


 


IV Total 183.3 ml 275.0 ml


 


Output Urine Total 1300 ml 450 ml


 


# Voids 3 











Current Medications








 Medications


  (Trade)  Dose


 Ordered  Sig/Herlinda


 Route


 PRN Reason  Start Time


 Stop Time Status Last Admin


Dose Admin


 


 Acetaminophen


  (Tylenol)  650 mg  Q4H  PRN


 ORAL


 fever  7/14/19 08:15


 8/13/19 08:14  7/15/19 00:52


 


 


 Cefepime HCl 1 gm/


 Dextrose  55 ml @ 


 110 mls/hr  EVERY 12  HOURS


 IVPB


   7/16/19 09:00


 7/21/19 10:00  7/19/19 08:41


 


 


 Dextrose


  (Dextrose 50%)  25 ml  Q30M  PRN


 IV


 Hypoglycemia  7/14/19 08:30


 8/13/19 08:16   


 


 


 Dextrose


  (Dextrose 50%)  50 ml  Q30M  PRN


 IV


 hypoglycemia  7/14/19 08:30


 8/13/19 08:29   


 


 


 Docusate Sodium


  (Colace)  100 mg  THREE TIMES A  DAY


 ORAL


   7/14/19 18:00


 8/13/19 17:59  7/19/19 08:41


 


 


 Folic Acid


  (Folate)  3 mg  DAILY


 ORAL


   7/15/19 09:15


 8/14/19 09:14  7/19/19 08:40


 


 


 Heparin Sodium


  (Porcine)


  (Heparin 5000


 units/ml)  5,000 units  EVERY 12  HOURS


 SUBQ


   7/14/19 09:00


 8/13/19 08:59  7/19/19 08:44


 


 


 Hydromorphone HCl


  (Dilaudid)  1 mg  Q4H  PRN


 IVP


 Severe Pain (Pain Scale 7-10)  7/15/19 22:15


 7/22/19 22:14  7/19/19 09:41


 


 


 Ibuprofen


  (Motrin)  600 mg  TID


 ORAL


   7/17/19 18:00


 8/16/19 17:59  7/19/19 08:41


 


 


 Nitroglycerin


  (Ntg)  0.4 mg  Q5M  PRN


 SL


 Prn Chest Pain  7/14/19 08:15


 8/13/19 08:14   


 


 


 Ondansetron HCl


  (Zofran)  4 mg  Q6H  PRN


 IVP


 Nausea & Vomiting  7/14/19 08:15


 8/13/19 08:14   


 


 


 Pantoprazole


  (Protonix)  40 mg  EVERY 12  HOURS


 ORAL


   7/14/19 21:00


 8/13/19 20:59  7/19/19 08:41


 


 


 Polyethylene


 Glycol


  (Miralax)  17 gm  DAILYPRN  PRN


 ORAL


 Constipation  7/14/19 08:15


 8/13/19 08:14  7/14/19 11:32


 


 


 Potassium Chloride


  (K-Dur)  40 meq  DAILY


 ORAL


   7/18/19 09:00


 8/17/19 08:59  7/19/19 08:40


 


 


 Temazepam


  (Restoril)  15 mg  HSPRN  PRN


 ORAL


 Insomnia  7/14/19 08:15


 7/21/19 08:14  7/18/19 20:45


 


 


 Vancomycin HCl


  (Vanco rx to


 dose)  1 ea  DAILY  PRN


 MISC


 .  7/14/19 08:30


 8/13/19 08:29   


 


 


 Vancomycin HCl 1


 gm/Dextrose  275 ml @ 


 183.3 mls/


 hr  Q8H


 IVPB


   7/16/19 21:00


 7/21/19 20:59  7/19/19 05:24


 





Laboratory Tests


7/19/19 05:10: 


White Blood Count 10.3, Red Blood Count 3.97L, Hemoglobin 11.9L, Hematocrit 

37.3L, Mean Corpuscular Volume 94, Mean Corpuscular Hemoglobin 30.1, Mean 

Corpuscular Hemoglobin Concent 32.0, Red Cell Distribution Width 12.7, Platelet 

Count 423, Mean Platelet Volume 4.8L, Neutrophils (%) (Auto) 71.1, Lymphocytes (

%) (Auto) 16.9L, Monocytes (%) (Auto) 9.9, Eosinophils (%) (Auto) 1.4, 

Basophils (%) (Auto) 0.6, Sodium Level 138, Potassium Level 3.9, Chloride Level 

103, Carbon Dioxide Level 30, Anion Gap 5, Blood Urea Nitrogen 14, Creatinine 

0.8, Estimat Glomerular Filtration Rate > 60, Glucose Level 98, Calcium Level 

8.6


Height (Feet):  6


Height (Inches):  1.00


Weight (Pounds):  169


General Appearance:  no apparent distress


Cardiovascular:  normal rate


Respiratory/Chest:  lungs clear


Abdomen:  soft


Objective


no change











Manuel Norton MD Jul 19, 2019 11:07

## 2019-07-19 NOTE — NUR
NURSE NOTES:

PT AXOX4, CALM, RESTING IN BED. IN NO APPARENT DISTRESS AT THIS TIME. PT EDUCATED ON PRN 
SCHEDULE OF DILAUDID. PT VERBALIZED UNDERSTANDING. CALL LIGHT WITHIN REACH. WILL CONTINUE TO 
MONITOR.

## 2019-07-19 NOTE — NUR
NURSE NOTES:

Received patient resting in bed. AAO x 4. On room air. Dressing is intact on L foot. IV on R 
FA is intact. No acute distress noted at this time. Bed locked, lowest position, side rails 
up x 2, bed alarm on, call light within reach. Will continue to monitor.

## 2019-07-19 NOTE — NUR
NURSE NOTES:

pt in bed with no sob nor in any form of distress noted. breathing regular and unlabored. 
prn pain meds given as needed with effectiveness. kept wound clean and dry. will continue to 
monitor

## 2019-07-19 NOTE — INFECTIOUS DISEASES PROG NOTE
Assessment/Plan


Assessment/Plan





Assessment/Plan:


58 yo male who presnted to the ED on 7/12/19 with left leg cellulitis.





Left foot cellulitis


    -wound cx: MRSA, GAS, P.  mirabilsi (Gonzalez S), C. freundi (R ancef, otherwise 

S), K. oxytoca (R amp, otherwise S)


    Leukocytosis ;SP


    Low grade fevers; improving


       -Tibia/fibula MRI: Negative for evidence of osteomyelitis. Evidence of 

superficial soft tissue ulcer, marked by a marker at the anteromedial shin 

region.Considerable subcutaneous fat edema. Given stated clinical history, 

probably on the basis of cellulitis. Correlate with clinical findings. No 

evidence of drainable abscess.


      -foot MRI:  Extensive soft tissue edema, as described. This is in keeping 

with stated clinical history of cellulitis. No findings to suggest abscess Very 

superficial fluid collections likely relate to stated clinical history of 

dorsal blisters. No marrow abnormality to suggest acute osteomyelitis 

demonstrated.


      -ANkle MRI:  Patchy areas of marrow edema, as described. Periarticular 

distribution of this is suggestive of arthropathy which may be degenerative, 

inflammatory, or, most likely, on the basis of Charcot-type changes. Per 

technologist, there are no ulcers in the area so osteomyelitis is deemed much 

less likely. Extensive edema of the subcutaneous fat. Given stated clinical 

history of cellulitis most likely on the basis of such. Correlate with clinical 

findings. No drainable


abscess collection demonstrated.


 


        -Foot xray: No acute injury identified. Soft tissue swelling. Multiple 

incidental findings as described











PLAN:





 - Continue Cefepime #6 and Vancomycin #6


   -ok to discharge on PO Bactrim DS 1 tab bid and Augmentin 875/125mg PO bid 

for 8 more days





 - f/u wound cx


 - Monitor CBC and Temps


 -Podiatry f/u





Thank you for this consult. We will continue to follow the patient during this 

hospitalization.





Subjective


Allergies:  


Coded Allergies:  


     No Known Allergies (Unverified , 7/13/19)


Subjective


afebrile >48hrs


leukocytosis resolved


BCx NTD





Objective


Vital Signs





Last 24 Hour Vital Signs








  Date Time  Temp Pulse Resp B/P (MAP) Pulse Ox O2 Delivery O2 Flow Rate FiO2


 


7/19/19 14:21 97.8       


 


7/19/19 12:57 97.8       


 


7/19/19 12:00 97.1 81 18 119/79 (92) 98   


 


7/19/19 09:00      Room Air  


 


7/19/19 08:18  80 16  96 Nasal Cannula  28


 


7/19/19 08:00 98.0 75 19 109/68 (82) 100   


 


7/19/19 04:00 97.8 78 18 117/76 (90) 100   


 


7/19/19 00:00 98.4 63 17 103/64 (77) 98   


 


7/18/19 21:00      Room Air  


 


7/18/19 20:50  83 18  95 Room Air  21


 


7/18/19 20:00 97.7 83 18 105/63 (77) 98   


 


7/18/19 16:00 98.2 78 18 120/70 (87) 96   








Height (Feet):  6


Height (Inches):  1.00


Weight (Pounds):  169


Objective


Gen:  NAD


HEENT: NCAT, MMM, EOMI, PERRL, No Oral lesion, no scleral icterus 


NECK:  full range of motion, supple, no meningismus, No LAD, No JVD


LUNGS:  CTAB, No W/C, No Accessory muscle use


CARDS: RRR, S1, S2, No M/R/G, 


ABD: Soft, NT, ND, No R/G, + BS, No HSM, No Masses


: Deferred


Ext: C/C/E, Pulses 2+ B/L (DP, Rad): LLE swelling with erythema and pain, 

Ulceration present with purulence.


NEURO: A/O x 4, Strength and Sensation Grossly intact


PSYCH: Normal mood and affect


SKIN:  Warm/dry, No rashes





Laboratory Tests








Test


  7/19/19


05:10


 


White Blood Count


  10.3 K/UL


(4.8-10.8)


 


Red Blood Count


  3.97 M/UL


(4.70-6.10)  L


 


Hemoglobin


  11.9 G/DL


(14.2-18.0)  L


 


Hematocrit


  37.3 %


(42.0-52.0)  L


 


Mean Corpuscular Volume 94 FL (80-99)  


 


Mean Corpuscular Hemoglobin


  30.1 PG


(27.0-31.0)


 


Mean Corpuscular Hemoglobin


Concent 32.0 G/DL


(32.0-36.0)


 


Red Cell Distribution Width


  12.7 %


(11.6-14.8)


 


Platelet Count


  423 K/UL


(150-450)


 


Mean Platelet Volume


  4.8 FL


(6.5-10.1)  L


 


Neutrophils (%) (Auto)


  71.1 %


(45.0-75.0)


 


Lymphocytes (%) (Auto)


  16.9 %


(20.0-45.0)  L


 


Monocytes (%) (Auto)


  9.9 %


(1.0-10.0)


 


Eosinophils (%) (Auto)


  1.4 %


(0.0-3.0)


 


Basophils (%) (Auto)


  0.6 %


(0.0-2.0)


 


Sodium Level


  138 MMOL/L


(136-145)


 


Potassium Level


  3.9 MMOL/L


(3.5-5.1)


 


Chloride Level


  103 MMOL/L


()


 


Carbon Dioxide Level


  30 MMOL/L


(21-32)


 


Anion Gap


  5 mmol/L


(5-15)


 


Blood Urea Nitrogen


  14 mg/dL


(7-18)


 


Creatinine


  0.8 MG/DL


(0.55-1.30)


 


Estimat Glomerular Filtration


Rate > 60 mL/min


(>60)


 


Glucose Level


  98 MG/DL


()


 


Calcium Level


  8.6 MG/DL


(8.5-10.1)











Current Medications








 Medications


  (Trade)  Dose


 Ordered  Sig/Herlinda


 Route


 PRN Reason  Start Time


 Stop Time Status Last Admin


Dose Admin


 


 Acetaminophen


  (Tylenol)  650 mg  Q4H  PRN


 ORAL


 fever  7/14/19 08:15


 8/13/19 08:14  7/15/19 00:52


 


 


 Cefepime HCl 1 gm/


 Dextrose  55 ml @ 


 110 mls/hr  EVERY 12  HOURS


 IVPB


   7/16/19 09:00


 7/21/19 10:00  7/19/19 08:41


 


 


 Dextrose


  (Dextrose 50%)  25 ml  Q30M  PRN


 IV


 Hypoglycemia  7/14/19 08:30


 8/13/19 08:16   


 


 


 Dextrose


  (Dextrose 50%)  50 ml  Q30M  PRN


 IV


 hypoglycemia  7/14/19 08:30


 8/13/19 08:29   


 


 


 Docusate Sodium


  (Colace)  100 mg  THREE TIMES A  DAY


 ORAL


   7/14/19 18:00


 8/13/19 17:59  7/19/19 12:27


 


 


 Folic Acid


  (Folate)  3 mg  DAILY


 ORAL


   7/15/19 09:15


 8/14/19 09:14  7/19/19 08:40


 


 


 Heparin Sodium


  (Porcine)


  (Heparin 5000


 units/ml)  5,000 units  EVERY 12  HOURS


 SUBQ


   7/14/19 09:00


 8/13/19 08:59  7/19/19 08:44


 


 


 Hydromorphone HCl


  (Dilaudid)  1 mg  Q4H  PRN


 IVP


 Severe Pain (Pain Scale 7-10)  7/15/19 22:15


 7/22/19 22:14  7/19/19 13:51


 


 


 Ibuprofen


  (Motrin)  600 mg  TID


 ORAL


   7/17/19 18:00


 8/16/19 17:59  7/19/19 12:27


 


 


 Nitroglycerin


  (Ntg)  0.4 mg  Q5M  PRN


 SL


 Prn Chest Pain  7/14/19 08:15


 8/13/19 08:14   


 


 


 Ondansetron HCl


  (Zofran)  4 mg  Q6H  PRN


 IVP


 Nausea & Vomiting  7/14/19 08:15


 8/13/19 08:14   


 


 


 Pantoprazole


  (Protonix)  40 mg  EVERY 12  HOURS


 ORAL


   7/14/19 21:00


 8/13/19 20:59  7/19/19 08:41


 


 


 Polyethylene


 Glycol


  (Miralax)  17 gm  DAILYPRN  PRN


 ORAL


 Constipation  7/14/19 08:15


 8/13/19 08:14  7/14/19 11:32


 


 


 Potassium Chloride


  (K-Dur)  40 meq  DAILY


 ORAL


   7/18/19 09:00


 8/17/19 08:59  7/19/19 08:40


 


 


 Temazepam


  (Restoril)  15 mg  HSPRN  PRN


 ORAL


 Insomnia  7/14/19 08:15


 7/21/19 08:14  7/18/19 20:45


 


 


 Vancomycin HCl


  (Vanco rx to


 dose)  1 ea  DAILY  PRN


 MISC


 .  7/14/19 08:30


 8/13/19 08:29   


 


 


 Vancomycin HCl 1


 gm/Dextrose  275 ml @ 


 183.3 mls/


 hr  Q8H


 IVPB


   7/16/19 21:00


 7/21/19 20:59  7/19/19 12:27


 

















Sarah Sinclair M.D. Jul 19, 2019 15:22

## 2019-07-19 NOTE — NUR
SI:SEPSIS . CELLULITIS

          VS: /79, P 81, T 97.1, RR 18, SpO2 95

          RBC 3.94, H&H 11.9/36.4, 



IS:     VANCOMYCIN 275ml IVPB



K-DUR 40meq

          MOTRIN 600mg

          CEFEPIME 55ml IVPB

             HEPARIN SUBQ



****************MED/SURG STATUS*****************

## 2019-07-19 NOTE — PULMONOLOGY PROGRESS NOTE
Assessment/Plan


Problems:  


(1) Sepsis


(2) Cellulitis of left foot


Assessment/Plan


improving


MRI of ankle reviewed


on abx


check cultures


wound care


dvt prophylaxis.





Subjective


ROS Limited/Unobtainable:  No


Constitutional:  Reports: no symptoms


HEENT:  Repors: no symptoms


Allergies:  


Coded Allergies:  


     No Known Allergies (Unverified , 7/13/19)





Objective





Last 24 Hour Vital Signs








  Date Time  Temp Pulse Resp B/P (MAP) Pulse Ox O2 Delivery O2 Flow Rate FiO2


 


7/19/19 12:57 97.8       


 


7/19/19 12:00 97.1 81 18 119/79 (92) 98   


 


7/19/19 10:11 97.8       


 


7/19/19 09:00      Room Air  


 


7/19/19 08:18  80 16  96 Nasal Cannula  28


 


7/19/19 08:00 98.0 75 19 109/68 (82) 100   


 


7/19/19 04:00 97.8 78 18 117/76 (90) 100   


 


7/19/19 00:00 98.4 63 17 103/64 (77) 98   


 


7/18/19 21:00      Room Air  


 


7/18/19 20:50  83 18  95 Room Air  21


 


7/18/19 20:00 97.7 83 18 105/63 (77) 98   


 


7/18/19 16:00 98.2 78 18 120/70 (87) 96   

















Intake and Output  


 


 7/18/19 7/19/19





 19:00 07:00


 


Intake Total 903.3 ml 275.0 ml


 


Output Total 1300 ml 450 ml


 


Balance -396.7 ml -175.0 ml


 


  


 


Intake Oral 720 ml 


 


IV Total 183.3 ml 275.0 ml


 


Output Urine Total 1300 ml 450 ml


 


# Voids 3 








Objective


General Appearance:  WD/WN


HEENT:  normocephalic, atraumatic


Respiratory/Chest:  chest wall non-tender, lungs clear, normal breath sounds


Breasts:  no masses


Cardiovascular:  normal peripheral pulses, normal rate


Abdomen:  normal bowel sounds, soft, non tender


Genitourinary:  normal external genitalia


Extremities:  no cyanosis, less edema


Laboratory Tests


7/19/19 05:10: 


White Blood Count 10.3, Red Blood Count 3.97L, Hemoglobin 11.9L, Hematocrit 

37.3L, Mean Corpuscular Volume 94, Mean Corpuscular Hemoglobin 30.1, Mean 

Corpuscular Hemoglobin Concent 32.0, Red Cell Distribution Width 12.7, Platelet 

Count 423, Mean Platelet Volume 4.8L, Neutrophils (%) (Auto) 71.1, Lymphocytes (

%) (Auto) 16.9L, Monocytes (%) (Auto) 9.9, Eosinophils (%) (Auto) 1.4, 

Basophils (%) (Auto) 0.6, Sodium Level 138, Potassium Level 3.9, Chloride Level 

103, Carbon Dioxide Level 30, Anion Gap 5, Blood Urea Nitrogen 14, Creatinine 

0.8, Estimat Glomerular Filtration Rate > 60, Glucose Level 98, Calcium Level 

8.6





Current Medications








 Medications


  (Trade)  Dose


 Ordered  Sig/Herlinda


 Route


 PRN Reason  Start Time


 Stop Time Status Last Admin


Dose Admin


 


 Acetaminophen


  (Tylenol)  650 mg  Q4H  PRN


 ORAL


 fever  7/14/19 08:15


 8/13/19 08:14  7/15/19 00:52


 


 


 Cefepime HCl 1 gm/


 Dextrose  55 ml @ 


 110 mls/hr  EVERY 12  HOURS


 IVPB


   7/16/19 09:00


 7/21/19 10:00  7/19/19 08:41


 


 


 Dextrose


  (Dextrose 50%)  25 ml  Q30M  PRN


 IV


 Hypoglycemia  7/14/19 08:30


 8/13/19 08:16   


 


 


 Dextrose


  (Dextrose 50%)  50 ml  Q30M  PRN


 IV


 hypoglycemia  7/14/19 08:30


 8/13/19 08:29   


 


 


 Docusate Sodium


  (Colace)  100 mg  THREE TIMES A  DAY


 ORAL


   7/14/19 18:00


 8/13/19 17:59  7/19/19 12:27


 


 


 Folic Acid


  (Folate)  3 mg  DAILY


 ORAL


   7/15/19 09:15


 8/14/19 09:14  7/19/19 08:40


 


 


 Heparin Sodium


  (Porcine)


  (Heparin 5000


 units/ml)  5,000 units  EVERY 12  HOURS


 SUBQ


   7/14/19 09:00


 8/13/19 08:59  7/19/19 08:44


 


 


 Hydromorphone HCl


  (Dilaudid)  1 mg  Q4H  PRN


 IVP


 Severe Pain (Pain Scale 7-10)  7/15/19 22:15


 7/22/19 22:14  7/19/19 13:51


 


 


 Ibuprofen


  (Motrin)  600 mg  TID


 ORAL


   7/17/19 18:00


 8/16/19 17:59  7/19/19 12:27


 


 


 Nitroglycerin


  (Ntg)  0.4 mg  Q5M  PRN


 SL


 Prn Chest Pain  7/14/19 08:15


 8/13/19 08:14   


 


 


 Ondansetron HCl


  (Zofran)  4 mg  Q6H  PRN


 IVP


 Nausea & Vomiting  7/14/19 08:15


 8/13/19 08:14   


 


 


 Pantoprazole


  (Protonix)  40 mg  EVERY 12  HOURS


 ORAL


   7/14/19 21:00


 8/13/19 20:59  7/19/19 08:41


 


 


 Polyethylene


 Glycol


  (Miralax)  17 gm  DAILYPRN  PRN


 ORAL


 Constipation  7/14/19 08:15


 8/13/19 08:14  7/14/19 11:32


 


 


 Potassium Chloride


  (K-Dur)  40 meq  DAILY


 ORAL


   7/18/19 09:00


 8/17/19 08:59  7/19/19 08:40


 


 


 Temazepam


  (Restoril)  15 mg  HSPRN  PRN


 ORAL


 Insomnia  7/14/19 08:15


 7/21/19 08:14  7/18/19 20:45


 


 


 Vancomycin HCl


  (Vanco rx to


 dose)  1 ea  DAILY  PRN


 MISC


 .  7/14/19 08:30


 8/13/19 08:29   


 


 


 Vancomycin HCl 1


 gm/Dextrose  275 ml @ 


 183.3 mls/


 hr  Q8H


 IVPB


   7/16/19 21:00


 7/21/19 20:59  7/19/19 12:27


 

















Pia Coe MD Jul 19, 2019 14:08

## 2019-07-20 VITALS — SYSTOLIC BLOOD PRESSURE: 109 MMHG | DIASTOLIC BLOOD PRESSURE: 71 MMHG

## 2019-07-20 VITALS — SYSTOLIC BLOOD PRESSURE: 119 MMHG | DIASTOLIC BLOOD PRESSURE: 74 MMHG

## 2019-07-20 VITALS — DIASTOLIC BLOOD PRESSURE: 70 MMHG | SYSTOLIC BLOOD PRESSURE: 119 MMHG

## 2019-07-20 VITALS — SYSTOLIC BLOOD PRESSURE: 117 MMHG | DIASTOLIC BLOOD PRESSURE: 80 MMHG

## 2019-07-20 VITALS — DIASTOLIC BLOOD PRESSURE: 64 MMHG | SYSTOLIC BLOOD PRESSURE: 106 MMHG

## 2019-07-20 LAB
ADD MANUAL DIFF: NO
ANION GAP SERPL CALC-SCNC: 4 MMOL/L (ref 5–15)
BASOPHILS NFR BLD AUTO: 0.7 % (ref 0–2)
BUN SERPL-MCNC: 16 MG/DL (ref 7–18)
CALCIUM SERPL-MCNC: 9.1 MG/DL (ref 8.5–10.1)
CHLORIDE SERPL-SCNC: 101 MMOL/L (ref 98–107)
CO2 SERPL-SCNC: 30 MMOL/L (ref 21–32)
CREAT SERPL-MCNC: 0.8 MG/DL (ref 0.55–1.3)
EOSINOPHIL NFR BLD AUTO: 1.3 % (ref 0–3)
ERYTHROCYTE [DISTWIDTH] IN BLOOD BY AUTOMATED COUNT: 12.3 % (ref 11.6–14.8)
HCT VFR BLD CALC: 36.4 % (ref 42–52)
HGB BLD-MCNC: 11.9 G/DL (ref 14.2–18)
LYMPHOCYTES NFR BLD AUTO: 14 % (ref 20–45)
MCV RBC AUTO: 93 FL (ref 80–99)
MONOCYTES NFR BLD AUTO: 9.1 % (ref 1–10)
NEUTROPHILS NFR BLD AUTO: 74.9 % (ref 45–75)
PLATELET # BLD: 486 K/UL (ref 150–450)
POTASSIUM SERPL-SCNC: 4.5 MMOL/L (ref 3.5–5.1)
RBC # BLD AUTO: 3.94 M/UL (ref 4.7–6.1)
SODIUM SERPL-SCNC: 135 MMOL/L (ref 136–145)
WBC # BLD AUTO: 10.2 K/UL (ref 4.8–10.8)

## 2019-07-20 RX ADMIN — SODIUM CHLORIDE SCH MLS/HR: 0.9 INJECTION INTRAVENOUS at 09:00

## 2019-07-20 RX ADMIN — DIPHENHYDRAMINE HYDROCHLORIDE PRN MG: 50 INJECTION INTRAMUSCULAR; INTRAVENOUS at 12:39

## 2019-07-20 RX ADMIN — DOCUSATE SODIUM SCH MG: 100 CAPSULE, LIQUID FILLED ORAL at 12:39

## 2019-07-20 RX ADMIN — SODIUM CHLORIDE SCH MLS/HR: 9 INJECTION, SOLUTION INTRAVENOUS at 05:53

## 2019-07-20 RX ADMIN — DOCUSATE SODIUM SCH MG: 100 CAPSULE, LIQUID FILLED ORAL at 17:03

## 2019-07-20 RX ADMIN — DIPHENHYDRAMINE HYDROCHLORIDE PRN MG: 50 INJECTION INTRAMUSCULAR; INTRAVENOUS at 01:58

## 2019-07-20 RX ADMIN — HEPARIN SODIUM SCH UNITS: 5000 INJECTION INTRAVENOUS; SUBCUTANEOUS at 20:57

## 2019-07-20 RX ADMIN — DIPHENHYDRAMINE HYDROCHLORIDE PRN MG: 50 INJECTION INTRAMUSCULAR; INTRAVENOUS at 17:03

## 2019-07-20 RX ADMIN — HEPARIN SODIUM SCH UNITS: 5000 INJECTION INTRAVENOUS; SUBCUTANEOUS at 09:01

## 2019-07-20 RX ADMIN — SODIUM CHLORIDE SCH MLS/HR: 9 INJECTION, SOLUTION INTRAVENOUS at 20:49

## 2019-07-20 RX ADMIN — DIPHENHYDRAMINE HYDROCHLORIDE PRN MG: 50 INJECTION INTRAMUSCULAR; INTRAVENOUS at 21:21

## 2019-07-20 RX ADMIN — DOCUSATE SODIUM SCH MG: 100 CAPSULE, LIQUID FILLED ORAL at 08:59

## 2019-07-20 RX ADMIN — DIPHENHYDRAMINE HYDROCHLORIDE PRN MG: 50 INJECTION INTRAMUSCULAR; INTRAVENOUS at 08:18

## 2019-07-20 RX ADMIN — SODIUM CHLORIDE SCH MLS/HR: 9 INJECTION, SOLUTION INTRAVENOUS at 12:40

## 2019-07-20 RX ADMIN — SODIUM CHLORIDE SCH MLS/HR: 0.9 INJECTION INTRAVENOUS at 23:38

## 2019-07-20 NOTE — NEPHROLOGY PROGRESS NOTE
Assessment/Plan


Problem List:  


(1) Cellulitis of left foot


(2) Sepsis


(3) Hyponatremia


(4) Hypoalbuminemia


Assessment


Left foot cellulitis / Leukocytosis  / No fever


Low Na


Low K


Anemia


High Lactic


Low Albumin


Plan








recheck BMI now 22.03


Isotonic solution


K supplement


DC IV fluids


monitor lytes


avoid nephrotoxics


urine tox screen positive for Amphetamines and Opiates


per orders


stable from renal stand if discharged





Subjective


ROS Limited/Unobtainable:  No





Objective


Objective





Last 24 Hour Vital Signs








  Date Time  Temp Pulse Resp B/P (MAP) Pulse Ox O2 Delivery O2 Flow Rate FiO2


 


7/20/19 12:00 97.4 72 22 117/80 (92) 99   


 


7/20/19 09:29 96.5       


 


7/20/19 09:00      Room Air  


 


7/20/19 08:48 96.5       


 


7/20/19 08:00 97.8 83 19 119/74 (89) 98   


 


7/20/19 04:00 96.5 83 19 109/71 (84) 99   


 


7/20/19 00:00 97.8 82 19 119/70 (86) 97   


 


7/19/19 21:00      Room Air  


 


7/19/19 20:00 97.9 85 20 111/73 (86) 97   


 


7/19/19 16:00 97.9 73 20 124/75 (91) 95   

















Intake and Output  


 


 7/19/19 7/20/19





 19:00 07:00


 


Intake Total 1530.0 ml 2130 ml


 


Output Total  950 ml


 


Balance 1530.0 ml 1180 ml


 


  


 


Intake Oral 1200 ml 1200 ml


 


IV Total 330.0 ml 330 ml


 


Other  600 ml


 


Output Urine Total  950 ml


 


# Voids 4 2


 


# Bowel Movements  1








Laboratory Tests


7/20/19 06:12: 


White Blood Count 10.2, Red Blood Count 3.94L, Hemoglobin 11.9L, Hematocrit 

36.4L, Mean Corpuscular Volume 93, Mean Corpuscular Hemoglobin 30.3, Mean 

Corpuscular Hemoglobin Concent 32.7, Red Cell Distribution Width 12.3, Platelet 

Count 486H, Mean Platelet Volume 4.9L, Neutrophils (%) (Auto) 74.9, Lymphocytes 

(%) (Auto) 14.0L, Monocytes (%) (Auto) 9.1, Eosinophils (%) (Auto) 1.3, 

Basophils (%) (Auto) 0.7, Sodium Level 135L, Potassium Level 4.5, Chloride 

Level 101, Carbon Dioxide Level 30, Anion Gap 4L, Blood Urea Nitrogen 16, 

Creatinine 0.8, Estimat Glomerular Filtration Rate > 60, Glucose Level 107H, 

Calcium Level 9.1


Height (Feet):  6


Height (Inches):  1.00


Weight (Pounds):  169


General Appearance:  no apparent distress


Objective


no change











Manuel Norton MD Jul 20, 2019 15:38

## 2019-07-20 NOTE — INFECTIOUS DISEASES PROG NOTE
Assessment/Plan


Assessment/Plan





Assessment/Plan:


60 yo male who presnted to the ED on 7/12/19 with left leg cellulitis.





Left foot cellulitis


    -wound cx: MRSA, GAS, P.  mirabilsi (Gonzalez S), C. freundi (R ancef, otherwise 

S), K. oxytoca (R amp, otherwise S)


    Leukocytosis ;SP


    Low grade fevers; SP


       -Tibia/fibula MRI: Negative for evidence of osteomyelitis. Evidence of 

superficial soft tissue ulcer, marked by a marker at the anteromedial shin 

region.Considerable subcutaneous fat edema. Given stated clinical history, 

probably on the basis of cellulitis. Correlate with clinical findings. No 

evidence of drainable abscess.


      -foot MRI:  Extensive soft tissue edema, as described. This is in keeping 

with stated clinical history of cellulitis. No findings to suggest abscess Very 

superficial fluid collections likely relate to stated clinical history of 

dorsal blisters. No marrow abnormality to suggest acute osteomyelitis 

demonstrated.


      -ANkle MRI:  Patchy areas of marrow edema, as described. Periarticular 

distribution of this is suggestive of arthropathy which may be degenerative, 

inflammatory, or, most likely, on the basis of Charcot-type changes. Per 

technologist, there are no ulcers in the area so osteomyelitis is deemed much 

less likely. Extensive edema of the subcutaneous fat. Given stated clinical 

history of cellulitis most likely on the basis of such. Correlate with clinical 

findings. No drainable


abscess collection demonstrated.


 


        -Foot xray: No acute injury identified. Soft tissue swelling. Multiple 

incidental findings as described











PLAN:





 - Continue Cefepime #7 and Vancomycin #7


   -ok to discharge on PO Bactrim DS 1 tab bid and Augmentin 875/125mg PO bid 

for 7 more days





 - f/u wound cx


 - Monitor CBC and Temps


 -Podiatry f/u





Thank you for this consult. We will continue to follow the patient during this 

hospitalization.





Subjective


Allergies:  


Coded Allergies:  


     No Known Allergies (Unverified , 7/13/19)


Subjective


afebrile 


leukocytosis resolved


BCx Neg





Objective


Vital Signs





Last 24 Hour Vital Signs








  Date Time  Temp Pulse Resp B/P (MAP) Pulse Ox O2 Delivery O2 Flow Rate FiO2


 


7/20/19 20:00 97.9 75 20 106/64 (78) 98   


 


7/20/19 17:33 97.3       


 


7/20/19 17:33 97.3       


 


7/20/19 16:00 97.3 99 20 109/71 (84) 97   


 


7/20/19 12:00 97.4 72 22 117/80 (92) 99   


 


7/20/19 09:00      Room Air  


 


7/20/19 08:00 97.8 83 19 119/74 (89) 98   


 


7/20/19 04:00 96.5 83 19 109/71 (84) 99   


 


7/20/19 00:00 97.8 82 19 119/70 (86) 97   








Height (Feet):  6


Height (Inches):  1.00


Weight (Pounds):  169


Objective


Gen:  NAD


HEENT: NCAT, MMM, EOMI, PERRL, No Oral lesion, no scleral icterus 


NECK:  full range of motion, supple, no meningismus, No LAD, No JVD


LUNGS:  CTAB, No W/C, No Accessory muscle use


CARDS: RRR, S1, S2, No M/R/G, 


ABD: Soft, NT, ND, No R/G, + BS, No HSM, No Masses


: Deferred


Ext: C/C/E, Pulses 2+ B/L (DP, Rad): LLE swelling with erythema and pain, 

Ulceration present with purulence.


NEURO: A/O x 4, Strength and Sensation Grossly intact


PSYCH: Normal mood and affect


SKIN:  Warm/dry, No rashes





Laboratory Tests








Test


  7/20/19


06:12


 


White Blood Count


  10.2 K/UL


(4.8-10.8)


 


Red Blood Count


  3.94 M/UL


(4.70-6.10)  L


 


Hemoglobin


  11.9 G/DL


(14.2-18.0)  L


 


Hematocrit


  36.4 %


(42.0-52.0)  L


 


Mean Corpuscular Volume 93 FL (80-99)  


 


Mean Corpuscular Hemoglobin


  30.3 PG


(27.0-31.0)


 


Mean Corpuscular Hemoglobin


Concent 32.7 G/DL


(32.0-36.0)


 


Red Cell Distribution Width


  12.3 %


(11.6-14.8)


 


Platelet Count


  486 K/UL


(150-450)  H


 


Mean Platelet Volume


  4.9 FL


(6.5-10.1)  L


 


Neutrophils (%) (Auto)


  74.9 %


(45.0-75.0)


 


Lymphocytes (%) (Auto)


  14.0 %


(20.0-45.0)  L


 


Monocytes (%) (Auto)


  9.1 %


(1.0-10.0)


 


Eosinophils (%) (Auto)


  1.3 %


(0.0-3.0)


 


Basophils (%) (Auto)


  0.7 %


(0.0-2.0)


 


Sodium Level


  135 MMOL/L


(136-145)  L


 


Potassium Level


  4.5 MMOL/L


(3.5-5.1)


 


Chloride Level


  101 MMOL/L


()


 


Carbon Dioxide Level


  30 MMOL/L


(21-32)


 


Anion Gap


  4 mmol/L


(5-15)  L


 


Blood Urea Nitrogen


  16 mg/dL


(7-18)


 


Creatinine


  0.8 MG/DL


(0.55-1.30)


 


Estimat Glomerular Filtration


Rate > 60 mL/min


(>60)


 


Glucose Level


  107 MG/DL


()  H


 


Calcium Level


  9.1 MG/DL


(8.5-10.1)











Current Medications








 Medications


  (Trade)  Dose


 Ordered  Sig/Herlinda


 Route


 PRN Reason  Start Time


 Stop Time Status Last Admin


Dose Admin


 


 Acetaminophen


  (Tylenol)  650 mg  Q4H  PRN


 ORAL


 fever  7/14/19 08:15


 8/13/19 08:14  7/15/19 00:52


 


 


 Cefepime HCl 1 gm/


 Dextrose  55 ml @ 


 110 mls/hr  EVERY 12  HOURS


 IVPB


   7/16/19 09:00


 7/21/19 10:00  7/20/19 09:00


 


 


 Dextrose


  (Dextrose 50%)  25 ml  Q30M  PRN


 IV


 Hypoglycemia  7/14/19 08:30


 8/13/19 08:16   


 


 


 Dextrose


  (Dextrose 50%)  50 ml  Q30M  PRN


 IV


 hypoglycemia  7/14/19 08:30


 8/13/19 08:29   


 


 


 Docusate Sodium


  (Colace)  100 mg  THREE TIMES A  DAY


 ORAL


   7/14/19 18:00


 8/13/19 17:59  7/20/19 17:03


 


 


 Folic Acid


  (Folate)  3 mg  DAILY


 ORAL


   7/15/19 09:15


 8/14/19 09:14  7/20/19 09:00


 


 


 Heparin Sodium


  (Porcine)


  (Heparin 5000


 units/ml)  5,000 units  EVERY 12  HOURS


 SUBQ


   7/14/19 09:00


 8/13/19 08:59  7/20/19 20:57


 


 


 Hydromorphone HCl


  (Dilaudid)  1 mg  Q4H  PRN


 IVP


 Severe Pain (Pain Scale 7-10)  7/15/19 22:15


 7/22/19 22:14  7/20/19 21:21


 


 


 Ibuprofen


  (Motrin)  600 mg  TID


 ORAL


   7/17/19 18:00


 8/16/19 17:59  7/20/19 17:03


 


 


 Nitroglycerin


  (Ntg)  0.4 mg  Q5M  PRN


 SL


 Prn Chest Pain  7/14/19 08:15


 8/13/19 08:14   


 


 


 Ondansetron HCl


  (Zofran)  4 mg  Q6H  PRN


 IVP


 Nausea & Vomiting  7/14/19 08:15


 8/13/19 08:14   


 


 


 Pantoprazole


  (Protonix)  40 mg  EVERY 12  HOURS


 ORAL


   7/14/19 21:00


 8/13/19 20:59  7/20/19 20:48


 


 


 Polyethylene


 Glycol


  (Miralax)  17 gm  DAILYPRN  PRN


 ORAL


 Constipation  7/14/19 08:15


 8/13/19 08:14  7/14/19 11:32


 


 


 Potassium Chloride


  (K-Dur)  40 meq  DAILY


 ORAL


   7/18/19 09:00


 8/17/19 08:59  7/20/19 08:59


 


 


 Temazepam


  (Restoril)  15 mg  HSPRN  PRN


 ORAL


 Insomnia  7/14/19 08:15


 7/21/19 08:14  7/18/19 20:45


 


 


 Vancomycin HCl


  (Vanco rx to


 dose)  1 ea  DAILY  PRN


 MISC


 .  7/14/19 08:30


 8/13/19 08:29   


 


 


 Vancomycin HCl 1


 gm/Dextrose  275 ml @ 


 183.3 mls/


 hr  Q8H


 IVPB


   7/16/19 21:00


 7/21/19 20:59  7/20/19 20:49


 

















Sarah Sinclair M.D. Jul 20, 2019 22:15

## 2019-07-20 NOTE — NUR
NURSE NOTES:

changed left lower leg wound: cleansed with NS, applied therahoney, secured with Optifoam.

## 2019-07-20 NOTE — NUR
NURSE NOTES:

received pt in bed, in room air, complains of 8/10 level of pain, medication given. Patient 
left lower leg dressing is dry and intact. Bed locked at the lowest position possible, call 
light within easy reach, siderails up x2. Will continue to monitor patient and follow up 
with the plan of care.

## 2019-07-20 NOTE — NUR
SI:SEPSIS . CELLULITIS

VS: /63, P 115, T 100.1, RR 20, SpO2 96

RBC 3.94, H&H 11.9/36.4, Na 135



IS:VANCOMYCIN 275ml IVPB

K-DUR 40meq

FOLATE 3mg

HEPARIN SUBQ

MOTRIN 600mg

DILAUDID 1mg IVP



*************MED/SURG STATUS*************

## 2019-07-20 NOTE — GENERAL PROGRESS NOTE
Assessment/Plan


Problem List:  


(1) Sepsis


ICD Codes:  A41.9 - Sepsis, unspecified organism


SNOMED:  26192781


(2) Cellulitis of left foot


ICD Codes:  L03.116 - Cellulitis of left lower limb


SNOMED:  392149861


Status:  stable, progressing


Assessment/Plan:


wound care abx pain control cbc bmp am  dc snf if clear





Subjective


Constitutional:  Reports: weakness


Allergies:  


Coded Allergies:  


     No Known Allergies (Unverified , 7/13/19)


All Systems:  reviewed and negative except above


Subjective


sleepy calm





Objective





Last 24 Hour Vital Signs








  Date Time  Temp Pulse Resp B/P (MAP) Pulse Ox O2 Delivery O2 Flow Rate FiO2


 


7/20/19 09:29 96.5       


 


7/20/19 08:48 96.5       


 


7/20/19 08:00 97.8 83 19 119/74 (89) 98   


 


7/20/19 04:00 96.5 83 19 109/71 (84) 99   


 


7/20/19 00:00 97.8 82 19 119/70 (86) 97   


 


7/19/19 21:00      Room Air  


 


7/19/19 20:00 97.9 85 20 111/73 (86) 97   


 


7/19/19 16:00 97.9 73 20 124/75 (91) 95   

















Intake and Output  


 


 7/19/19 7/20/19





 19:00 07:00


 


Intake Total 1530.0 ml 2130 ml


 


Output Total  950 ml


 


Balance 1530.0 ml 1180 ml


 


  


 


Intake Oral 1200 ml 1200 ml


 


IV Total 330.0 ml 330 ml


 


Other  600 ml


 


Output Urine Total  950 ml


 


# Voids 4 2


 


# Bowel Movements  1








Laboratory Tests


7/20/19 06:12: 


White Blood Count 10.2, Red Blood Count 3.94L, Hemoglobin 11.9L, Hematocrit 

36.4L, Mean Corpuscular Volume 93, Mean Corpuscular Hemoglobin 30.3, Mean 

Corpuscular Hemoglobin Concent 32.7, Red Cell Distribution Width 12.3, Platelet 

Count 486H, Mean Platelet Volume 4.9L, Neutrophils (%) (Auto) 74.9, Lymphocytes 

(%) (Auto) 14.0L, Monocytes (%) (Auto) 9.1, Eosinophils (%) (Auto) 1.3, 

Basophils (%) (Auto) 0.7, Sodium Level 135L, Potassium Level 4.5, Chloride 

Level 101, Carbon Dioxide Level 30, Anion Gap 4L, Blood Urea Nitrogen 16, 

Creatinine 0.8, Estimat Glomerular Filtration Rate > 60, Glucose Level 107H, 

Calcium Level 9.1


Height (Feet):  6


Height (Inches):  1.00


Weight (Pounds):  169


General Appearance:  lethargic


EENT:  normal ENT inspection


Neck:  normal alignment


Cardiovascular:  normal peripheral pulses, normal rate, regular rhythm


Respiratory/Chest:  chest wall non-tender, lungs clear, normal breath sounds


Abdomen:  normal bowel sounds, non tender, soft


Extremities:  normal inspection


Edema:  no edema noted Arm (L), no edema noted Arm (R), no edema noted Leg (L), 

no edema noted Leg (R), no edema noted Pedal (L), no edema noted Pedal (R), no 

edema noted Generalized


Neurologic:  motor weakness


Skin:  normal pigmentation, warm/dry


Objective


l foor sl red and swollen up to ankle











Freddy Muñiz DO Jul 20, 2019 12:18

## 2019-07-21 VITALS — DIASTOLIC BLOOD PRESSURE: 64 MMHG | SYSTOLIC BLOOD PRESSURE: 104 MMHG

## 2019-07-21 VITALS — DIASTOLIC BLOOD PRESSURE: 63 MMHG | SYSTOLIC BLOOD PRESSURE: 122 MMHG

## 2019-07-21 VITALS — SYSTOLIC BLOOD PRESSURE: 116 MMHG | DIASTOLIC BLOOD PRESSURE: 80 MMHG

## 2019-07-21 VITALS — DIASTOLIC BLOOD PRESSURE: 60 MMHG | SYSTOLIC BLOOD PRESSURE: 110 MMHG

## 2019-07-21 VITALS — DIASTOLIC BLOOD PRESSURE: 64 MMHG | SYSTOLIC BLOOD PRESSURE: 109 MMHG

## 2019-07-21 VITALS — DIASTOLIC BLOOD PRESSURE: 63 MMHG | SYSTOLIC BLOOD PRESSURE: 120 MMHG

## 2019-07-21 LAB
ADD MANUAL DIFF: NO
ALBUMIN SERPL-MCNC: 2.6 G/DL (ref 3.4–5)
ALP SERPL-CCNC: 100 U/L (ref 46–116)
ALT SERPL-CCNC: 30 U/L (ref 12–78)
ANION GAP SERPL CALC-SCNC: 6 MMOL/L (ref 5–15)
AST SERPL-CCNC: 25 U/L (ref 15–37)
BASOPHILS NFR BLD AUTO: 0.5 % (ref 0–2)
BILIRUB DIRECT SERPL-MCNC: < 0.1 MG/DL (ref 0–0.3)
BILIRUB SERPL-MCNC: 0.4 MG/DL (ref 0.2–1)
BUN SERPL-MCNC: 12 MG/DL (ref 7–18)
CALCIUM SERPL-MCNC: 9.5 MG/DL (ref 8.5–10.1)
CHLORIDE SERPL-SCNC: 100 MMOL/L (ref 98–107)
CO2 SERPL-SCNC: 31 MMOL/L (ref 21–32)
CREAT SERPL-MCNC: 0.9 MG/DL (ref 0.55–1.3)
EOSINOPHIL NFR BLD AUTO: 0.4 % (ref 0–3)
ERYTHROCYTE [DISTWIDTH] IN BLOOD BY AUTOMATED COUNT: 12.4 % (ref 11.6–14.8)
GAMMA GLUTAMYL TRANSPEPTIDASE: 36 U/L (ref 5–85)
HCT VFR BLD CALC: 39.1 % (ref 42–52)
HGB BLD-MCNC: 12.5 G/DL (ref 14.2–18)
LYMPHOCYTES NFR BLD AUTO: 14.3 % (ref 20–45)
MCV RBC AUTO: 94 FL (ref 80–99)
MONOCYTES NFR BLD AUTO: 4.9 % (ref 1–10)
NEUTROPHILS NFR BLD AUTO: 80.1 % (ref 45–75)
PHOSPHATE SERPL-MCNC: 4.1 MG/DL (ref 2.5–4.9)
PLATELET # BLD: 548 K/UL (ref 150–450)
POTASSIUM SERPL-SCNC: 4.3 MMOL/L (ref 3.5–5.1)
RBC # BLD AUTO: 4.17 M/UL (ref 4.7–6.1)
SODIUM SERPL-SCNC: 136 MMOL/L (ref 136–145)
WBC # BLD AUTO: 12.6 K/UL (ref 4.8–10.8)

## 2019-07-21 RX ADMIN — DOCUSATE SODIUM SCH MG: 100 CAPSULE, LIQUID FILLED ORAL at 08:11

## 2019-07-21 RX ADMIN — SULFAMETHOXAZOLE AND TRIMETHOPRIM SCH TAB: 800; 160 TABLET ORAL at 20:16

## 2019-07-21 RX ADMIN — DIPHENHYDRAMINE HYDROCHLORIDE PRN MG: 50 INJECTION INTRAMUSCULAR; INTRAVENOUS at 05:55

## 2019-07-21 RX ADMIN — DOCUSATE SODIUM SCH MG: 100 CAPSULE, LIQUID FILLED ORAL at 12:08

## 2019-07-21 RX ADMIN — AMOXICILLIN AND CLAVULANATE POTASSIUM SCH MG: 875; 125 TABLET, FILM COATED ORAL at 20:16

## 2019-07-21 RX ADMIN — HEPARIN SODIUM SCH UNITS: 5000 INJECTION INTRAVENOUS; SUBCUTANEOUS at 20:26

## 2019-07-21 RX ADMIN — SODIUM CHLORIDE SCH MLS/HR: 9 INJECTION, SOLUTION INTRAVENOUS at 12:08

## 2019-07-21 RX ADMIN — SODIUM CHLORIDE SCH MLS/HR: 9 INJECTION, SOLUTION INTRAVENOUS at 04:26

## 2019-07-21 RX ADMIN — DOCUSATE SODIUM SCH MG: 100 CAPSULE, LIQUID FILLED ORAL at 17:18

## 2019-07-21 RX ADMIN — SODIUM CHLORIDE SCH MLS/HR: 0.9 INJECTION INTRAVENOUS at 08:15

## 2019-07-21 RX ADMIN — DIPHENHYDRAMINE HYDROCHLORIDE PRN MG: 50 INJECTION INTRAMUSCULAR; INTRAVENOUS at 01:50

## 2019-07-21 RX ADMIN — DIPHENHYDRAMINE HYDROCHLORIDE PRN MG: 50 INJECTION INTRAMUSCULAR; INTRAVENOUS at 16:17

## 2019-07-21 RX ADMIN — DIPHENHYDRAMINE HYDROCHLORIDE PRN MG: 50 INJECTION INTRAMUSCULAR; INTRAVENOUS at 12:09

## 2019-07-21 RX ADMIN — DIPHENHYDRAMINE HYDROCHLORIDE PRN MG: 50 INJECTION INTRAMUSCULAR; INTRAVENOUS at 20:18

## 2019-07-21 RX ADMIN — HEPARIN SODIUM SCH UNITS: 5000 INJECTION INTRAVENOUS; SUBCUTANEOUS at 08:15

## 2019-07-21 NOTE — CARDIOLOGY REPORT
--------------- APPROVED REPORT --------------





EXAM: Two-dimensional and M-mode echocardiogram with Doppler and color Doppler.



INDICATION

Congestive Heart Failure 



M-Mode DIMENSIONS 

IVSd1.2 (0.7-1.1cm)Left Atrium (MM)3.2 (1.6-4.0cm)

LVDd4.4 (3.5-5.6cm)Aortic Root3.4 (2.0-3.7cm)

PWd1.1 (0.7-1.1cm)Aortic Cusp Exc.2.0 (1.5-2.0cm)



LVDs2.9 (2.5-4.0cm)

PWs1.8 cm





Normal left ventricular chamber size, systolic function and wall motion.

Left ventricular ejection fraction estimated to be 55 %.

Mild left ventricular hypertrophy.

No evidence of pericardial effusion. 

All other cardiac chamber sizes are within normal limits. 

Focal aortic valve sclerosis with adequate cusp excursion.

Thickened mitral valve leaflets with normal excursion.

Mild mitral annulus and aortic root calcification.

Pulmonic valve not well visualized.

Normal tricuspid valve structure.  

IVC dilated at 2.7 cm with physiological collapse, suggestive of increased RA pressure.



A  color flow and spectral Doppler study was performed and revealed:

No aortic insufficiency.

No mitral regurgitation.

Mitral diastolic velocities suggest moderate left ventricular diastolic dysfunction 

(Grade II)- pseudonormal LV physiology.  

Mild tricuspid regurgitation.

Tricuspid systolic velocities suggests peak right ventricular systolic pressure of 47 

mmHg, consistent with moderate pulmonary hypertension.

No pulmonic regurgitation present.

## 2019-07-21 NOTE — NUR
NURSE NOTES:

received patient resting in bed.patient awake, alert oriented, no sign of distress, denies 
pain, left foot elevated on pillow. call light within  reach, on fall precaution, bed in low 
position,  Will continue to monitor.



genevieve rea

## 2019-07-21 NOTE — GENERAL PROGRESS NOTE
Assessment/Plan


Problem List:  


(1) Sepsis


ICD Codes:  A41.9 - Sepsis, unspecified organism


SNOMED:  12231205


(2) Cellulitis of left foot


ICD Codes:  L03.116 - Cellulitis of left lower limb


SNOMED:  796843444


Status:  stable, progressing


Assessment/Plan:


wound care abx pain control cbc bmp am  dc snf if clear





Subjective


Constitutional:  Reports: weakness


Allergies:  


Coded Allergies:  


     No Known Allergies (Unverified , 7/13/19)


All Systems:  reviewed and negative except above


Subjective


sleepy calm





Objective





Last 24 Hour Vital Signs








  Date Time  Temp Pulse Resp B/P (MAP) Pulse Ox O2 Delivery O2 Flow Rate FiO2


 


7/21/19 08:00 97.7 115 16 122/63 (82) 96   


 


7/21/19 05:49 97.7       


 


7/21/19 04:00 100.1 98 18 104/64 (77) 96   


 


7/21/19 00:00 99.3 100 20 116/80 (92) 96   


 


7/20/19 21:00      Room Air  


 


7/20/19 20:00 97.9 75 20 106/64 (78) 98   


 


7/20/19 17:33 97.3       


 


7/20/19 17:33 97.3       


 


7/20/19 16:00 97.3 99 20 109/71 (84) 97   


 


7/20/19 12:00 97.4 72 22 117/80 (92) 99   

















Intake and Output  


 


 7/20/19 7/21/19





 18:59 06:59


 


Intake Total 1000 ml 1905.0 ml


 


Output Total  1600 ml


 


Balance 1000 ml 305.0 ml


 


  


 


Intake Oral 1000 ml 1300 ml


 


IV Total  605.0 ml


 


Output Urine Total  1600 ml


 


# Voids 4 5








Laboratory Tests


7/21/19 04:51: 


White Blood Count 12.6H, Red Blood Count 4.17L, Hemoglobin 12.5L, Hematocrit 

39.1L, Mean Corpuscular Volume 94, Mean Corpuscular Hemoglobin 30.1, Mean 

Corpuscular Hemoglobin Concent 32.1, Red Cell Distribution Width 12.4, Platelet 

Count 548H, Mean Platelet Volume 4.5L, Neutrophils (%) (Auto) 80.1H, 

Lymphocytes (%) (Auto) 14.3L, Monocytes (%) (Auto) 4.9, Eosinophils (%) (Auto) 

0.4, Basophils (%) (Auto) 0.5, Sodium Level 136, Potassium Level 4.3, Chloride 

Level 100, Carbon Dioxide Level 31, Anion Gap 6, Blood Urea Nitrogen 12, 

Creatinine 0.9, Estimat Glomerular Filtration Rate > 60, Glucose Level 115H, 

Calcium Level 9.5, Phosphorus Level 4.1, Magnesium Level 2.1, Total Bilirubin 

0.4, Direct Bilirubin < 0.1, Gamma Glutamyl Transpeptidase 36, Aspartate Amino 

Transf (AST/SGOT) 25, Alanine Aminotransferase (ALT/SGPT) 30, Alkaline 

Phosphatase 100, Total Protein 7.5, Albumin 2.6L


Height (Feet):  6


Height (Inches):  1.00


Weight (Pounds):  169


General Appearance:  lethargic


EENT:  normal ENT inspection


Neck:  normal alignment


Cardiovascular:  normal peripheral pulses, normal rate, regular rhythm


Respiratory/Chest:  chest wall non-tender, lungs clear, normal breath sounds


Abdomen:  normal bowel sounds, non tender, soft


Extremities:  normal inspection


Edema:  1+ Arm (L), 1+ Arm (R), 1+ Leg (L), 1+ Leg (R), 1+ Pedal (L), 1+ Pedal (

R), 1+ Generalized


Edema:  trace edema


Neurologic:  motor weakness


Skin:  normal pigmentation, warm/dry


Objective


l foor sl red and swollen up to ankle











Freddy Muñiz DO Jul 21, 2019 09:05

## 2019-07-21 NOTE — NUR
HAND-OFF: 

Report given to IMER Harding

Resting comfortably in bed, patient stable and free from injury



imer rea.

## 2019-07-21 NOTE — NUR
NURSE NOTES:

Patient received in bed, awake and alert. Left leg with optifoam, elevated on pillow. IV 
site intact and patent. Call light and urinal within reach. C/o of pain, will medicate as 
prescribed. Will continue plan of care.

## 2019-07-21 NOTE — NEPHROLOGY PROGRESS NOTE
Assessment/Plan


Problem List:  


(1) Cellulitis of left foot


(2) Sepsis


(3) Hyponatremia


(4) Hypoalbuminemia


Assessment


Left foot cellulitis / Leukocytosis  / No fever


Low Na


Low K


Anemia


High Lactic


Low Albumin


Plan








recheck BMI now 22.03


Isotonic solution


K supplement


DC IV fluids


monitor lytes


avoid nephrotoxics


urine tox screen positive for Amphetamines and Opiates


per orders


stable from renal stand if discharged





Subjective


ROS Limited/Unobtainable:  No


Constitutional:  Reports: malaise





Objective


Objective





Last 24 Hour Vital Signs








  Date Time  Temp Pulse Resp B/P (MAP) Pulse Ox O2 Delivery O2 Flow Rate FiO2


 


7/21/19 16:11 99.0 103 19 110/60 (77) 96   


 


7/21/19 12:02 98.8 115 19 120/63 (82) 96   


 


7/21/19 08:50      Room Air  


 


7/21/19 08:00 97.7 115 16 122/63 (82) 96   


 


7/21/19 05:49 97.7       


 


7/21/19 04:00 100.1 98 18 104/64 (77) 96   


 


7/21/19 00:00 99.3 100 20 116/80 (92) 96   


 


7/20/19 21:00      Room Air  


 


7/20/19 20:00 97.9 75 20 106/64 (78) 98   


 


7/20/19 17:33 97.3       


 


7/20/19 17:33 97.3       

















Intake and Output  


 


 7/20/19 7/21/19





 19:00 07:00


 


Intake Total 1000 ml 1905.0 ml


 


Output Total  1600 ml


 


Balance 1000 ml 305.0 ml


 


  


 


Intake Oral 1000 ml 1300 ml


 


IV Total  605.0 ml


 


Output Urine Total  1600 ml


 


# Voids 4 5








Laboratory Tests


7/21/19 04:51: 


White Blood Count 12.6H, Red Blood Count 4.17L, Hemoglobin 12.5L, Hematocrit 

39.1L, Mean Corpuscular Volume 94, Mean Corpuscular Hemoglobin 30.1, Mean 

Corpuscular Hemoglobin Concent 32.1, Red Cell Distribution Width 12.4, Platelet 

Count 548H, Mean Platelet Volume 4.5L, Neutrophils (%) (Auto) 80.1H, 

Lymphocytes (%) (Auto) 14.3L, Monocytes (%) (Auto) 4.9, Eosinophils (%) (Auto) 

0.4, Basophils (%) (Auto) 0.5, Sodium Level 136, Potassium Level 4.3, Chloride 

Level 100, Carbon Dioxide Level 31, Anion Gap 6, Blood Urea Nitrogen 12, 

Creatinine 0.9, Estimat Glomerular Filtration Rate > 60, Glucose Level 115H, 

Calcium Level 9.5, Phosphorus Level 4.1, Magnesium Level 2.1, Total Bilirubin 

0.4, Direct Bilirubin < 0.1, Gamma Glutamyl Transpeptidase 36, Aspartate Amino 

Transf (AST/SGOT) 25, Alanine Aminotransferase (ALT/SGPT) 30, Alkaline 

Phosphatase 100, Total Protein 7.5, Albumin 2.6L


Height (Feet):  6


Height (Inches):  1.00


Weight (Pounds):  169


General Appearance:  no apparent distress


Objective


no change











Manuel Norton MD Jul 21, 2019 17:21

## 2019-07-21 NOTE — NUR
SI:SEPSIS . CELLULITIS

VS: /63, P 115, T 97.0, RR 19, SpO2 96

WBC 12.6, RBC 4.17, H&h 12.5/39.1



IS:TYLENOL 650mg

VANCOMYCIN 275ml IVPB

K-DUR

AUGMENTIN 875mg

BACTRIM DS 1tab

MOTRIN 600mg

DILAUDID 1mg IVP



****************MED/SURG STATUS****************

## 2019-07-22 VITALS — DIASTOLIC BLOOD PRESSURE: 68 MMHG | SYSTOLIC BLOOD PRESSURE: 112 MMHG

## 2019-07-22 VITALS — DIASTOLIC BLOOD PRESSURE: 74 MMHG | SYSTOLIC BLOOD PRESSURE: 103 MMHG

## 2019-07-22 VITALS — SYSTOLIC BLOOD PRESSURE: 111 MMHG | DIASTOLIC BLOOD PRESSURE: 69 MMHG

## 2019-07-22 VITALS — SYSTOLIC BLOOD PRESSURE: 101 MMHG | DIASTOLIC BLOOD PRESSURE: 67 MMHG

## 2019-07-22 VITALS — SYSTOLIC BLOOD PRESSURE: 97 MMHG | DIASTOLIC BLOOD PRESSURE: 68 MMHG

## 2019-07-22 VITALS — SYSTOLIC BLOOD PRESSURE: 104 MMHG | DIASTOLIC BLOOD PRESSURE: 63 MMHG

## 2019-07-22 LAB
ADD MANUAL DIFF: NO
ANION GAP SERPL CALC-SCNC: 7 MMOL/L (ref 5–15)
BASOPHILS NFR BLD AUTO: 0.6 % (ref 0–2)
BUN SERPL-MCNC: 18 MG/DL (ref 7–18)
CALCIUM SERPL-MCNC: 9.2 MG/DL (ref 8.5–10.1)
CHLORIDE SERPL-SCNC: 100 MMOL/L (ref 98–107)
CO2 SERPL-SCNC: 28 MMOL/L (ref 21–32)
CREAT SERPL-MCNC: 1 MG/DL (ref 0.55–1.3)
EOSINOPHIL NFR BLD AUTO: 1.2 % (ref 0–3)
ERYTHROCYTE [DISTWIDTH] IN BLOOD BY AUTOMATED COUNT: 12.4 % (ref 11.6–14.8)
HCT VFR BLD CALC: 37.3 % (ref 42–52)
HGB BLD-MCNC: 12 G/DL (ref 14.2–18)
LYMPHOCYTES NFR BLD AUTO: 23 % (ref 20–45)
MCV RBC AUTO: 94 FL (ref 80–99)
MONOCYTES NFR BLD AUTO: 9.9 % (ref 1–10)
NEUTROPHILS NFR BLD AUTO: 65.2 % (ref 45–75)
PLATELET # BLD: 561 K/UL (ref 150–450)
POTASSIUM SERPL-SCNC: 4.5 MMOL/L (ref 3.5–5.1)
RBC # BLD AUTO: 3.99 M/UL (ref 4.7–6.1)
SODIUM SERPL-SCNC: 135 MMOL/L (ref 136–145)
WBC # BLD AUTO: 9.5 K/UL (ref 4.8–10.8)

## 2019-07-22 RX ADMIN — DOCUSATE SODIUM SCH MG: 100 CAPSULE, LIQUID FILLED ORAL at 17:50

## 2019-07-22 RX ADMIN — DOCUSATE SODIUM SCH MG: 100 CAPSULE, LIQUID FILLED ORAL at 12:18

## 2019-07-22 RX ADMIN — AMOXICILLIN AND CLAVULANATE POTASSIUM SCH MG: 875; 125 TABLET, FILM COATED ORAL at 20:57

## 2019-07-22 RX ADMIN — AMOXICILLIN AND CLAVULANATE POTASSIUM SCH MG: 875; 125 TABLET, FILM COATED ORAL at 09:33

## 2019-07-22 RX ADMIN — DIPHENHYDRAMINE HYDROCHLORIDE PRN MG: 50 INJECTION INTRAMUSCULAR; INTRAVENOUS at 09:29

## 2019-07-22 RX ADMIN — DIPHENHYDRAMINE HYDROCHLORIDE PRN MG: 50 INJECTION INTRAMUSCULAR; INTRAVENOUS at 17:50

## 2019-07-22 RX ADMIN — DIPHENHYDRAMINE HYDROCHLORIDE PRN MG: 50 INJECTION INTRAMUSCULAR; INTRAVENOUS at 04:36

## 2019-07-22 RX ADMIN — HEPARIN SODIUM SCH UNITS: 5000 INJECTION INTRAVENOUS; SUBCUTANEOUS at 20:57

## 2019-07-22 RX ADMIN — DIPHENHYDRAMINE HYDROCHLORIDE PRN MG: 50 INJECTION INTRAMUSCULAR; INTRAVENOUS at 22:02

## 2019-07-22 RX ADMIN — DIPHENHYDRAMINE HYDROCHLORIDE PRN MG: 50 INJECTION INTRAMUSCULAR; INTRAVENOUS at 13:44

## 2019-07-22 RX ADMIN — DOCUSATE SODIUM SCH MG: 100 CAPSULE, LIQUID FILLED ORAL at 09:32

## 2019-07-22 RX ADMIN — HEPARIN SODIUM SCH UNITS: 5000 INJECTION INTRAVENOUS; SUBCUTANEOUS at 09:40

## 2019-07-22 RX ADMIN — SULFAMETHOXAZOLE AND TRIMETHOPRIM SCH TAB: 800; 160 TABLET ORAL at 17:50

## 2019-07-22 RX ADMIN — SULFAMETHOXAZOLE AND TRIMETHOPRIM SCH TAB: 800; 160 TABLET ORAL at 09:33

## 2019-07-22 RX ADMIN — DIPHENHYDRAMINE HYDROCHLORIDE PRN MG: 50 INJECTION INTRAMUSCULAR; INTRAVENOUS at 00:18

## 2019-07-22 NOTE — INFECTIOUS DISEASES PROG NOTE
Assessment/Plan


Assessment/Plan





Assessment/Plan:


60 yo male who presnted to the ED on 7/12/19 with left leg cellulitis.





Left foot cellulitis


    -wound cx: MRSA, GAS, P.  mirabilsi (Gonzalez S), C. freundi (R ancef, otherwise 

S), K. oxytoca (R amp, otherwise S)


    Leukocytosis ;SP


    Low grade fevers; SP


       -Tibia/fibula MRI: Negative for evidence of osteomyelitis. Evidence of 

superficial soft tissue ulcer, marked by a marker at the anteromedial shin 

region.Considerable subcutaneous fat edema. Given stated clinical history, 

probably on the basis of cellulitis. Correlate with clinical findings. No 

evidence of drainable abscess.


      -foot MRI:  Extensive soft tissue edema, as described. This is in keeping 

with stated clinical history of cellulitis. No findings to suggest abscess Very 

superficial fluid collections likely relate to stated clinical history of 

dorsal blisters. No marrow abnormality to suggest acute osteomyelitis 

demonstrated.


      -ANkle MRI:  Patchy areas of marrow edema, as described. Periarticular 

distribution of this is suggestive of arthropathy which may be degenerative, 

inflammatory, or, most likely, on the basis of Charcot-type changes. Per 

technologist, there are no ulcers in the area so osteomyelitis is deemed much 

less likely. Extensive edema of the subcutaneous fat. Given stated clinical 

history of cellulitis most likely on the basis of such. Correlate with clinical 

findings. No drainable


abscess collection demonstrated.


 


        -Foot xray: No acute injury identified. Soft tissue swelling. Multiple 

incidental findings as described











PLAN:





 - Continue PO Bactrim DS 1 tab bid and Augmentin 875/125mg PO bid #2 (abx d # 9 /14) 


       --ok to discharge on above regimen





     -7/21 SP Cefepime #8, IV Vancomycin #8





 - f/u wound cx


 - Monitor CBC and Temps


 -Podiatry f/u





Thank you for this consult. We will continue to follow the patient during this 

hospitalization.





Subjective


Allergies:  


Coded Allergies:  


     No Known Allergies (Unverified , 7/13/19)


Subjective


afebrile >24hrs


leukocytosis resolved


BCx Neg





Objective


Vital Signs





Last 24 Hour Vital Signs








  Date Time  Temp Pulse Resp B/P (MAP) Pulse Ox O2 Delivery O2 Flow Rate FiO2


 


7/22/19 12:49 97.8       


 


7/22/19 12:00 97.0 75 18 101/67 (78) 98   


 


7/22/19 09:59 97.8       


 


7/22/19 09:00      Room Air  


 


7/22/19 08:00 97.8 73 18 111/69 (83) 100   


 


7/22/19 04:00 97.8 83 19 104/63 (77) 97   


 


7/22/19 00:11 97.8 86 19 112/68 (83) 97   


 


7/21/19 21:00      Room Air  


 


7/21/19 20:00 97.0 83 19 109/64 (79) 97   


 


7/21/19 16:11 99.0 103 19 110/60 (77) 96   








Height (Feet):  6


Height (Inches):  1.00


Weight (Pounds):  169


Objective


Gen:  NAD


HEENT: NCAT, MMM, EOMI, PERRL, No Oral lesion, no scleral icterus 


NECK:  full range of motion, supple, no meningismus, No LAD, No JVD


LUNGS:  CTAB, No W/C, No Accessory muscle use


CARDS: RRR, S1, S2, No M/R/G, 


ABD: Soft, NT, ND, No R/G, + BS, No HSM, No Masses


: Deferred


Ext: C/C/E, Pulses 2+ B/L (DP, Rad): LLE swelling with erythema and pain, 

Ulceration present with purulence.


NEURO: A/O x 4, Strength and Sensation Grossly intact


PSYCH: Normal mood and affect


SKIN:  Warm/dry, No rashes





Laboratory Tests








Test


  7/22/19


05:15


 


White Blood Count


  9.5 K/UL


(4.8-10.8)


 


Red Blood Count


  3.99 M/UL


(4.70-6.10)  L


 


Hemoglobin


  12.0 G/DL


(14.2-18.0)  L


 


Hematocrit


  37.3 %


(42.0-52.0)  L


 


Mean Corpuscular Volume 94 FL (80-99)  


 


Mean Corpuscular Hemoglobin


  30.0 PG


(27.0-31.0)


 


Mean Corpuscular Hemoglobin


Concent 32.1 G/DL


(32.0-36.0)


 


Red Cell Distribution Width


  12.4 %


(11.6-14.8)


 


Platelet Count


  561 K/UL


(150-450)  H


 


Mean Platelet Volume


  4.7 FL


(6.5-10.1)  L


 


Neutrophils (%) (Auto)


  65.2 %


(45.0-75.0)


 


Lymphocytes (%) (Auto)


  23.0 %


(20.0-45.0)


 


Monocytes (%) (Auto)


  9.9 %


(1.0-10.0)


 


Eosinophils (%) (Auto)


  1.2 %


(0.0-3.0)


 


Basophils (%) (Auto)


  0.6 %


(0.0-2.0)


 


Sodium Level


  135 MMOL/L


(136-145)  L


 


Potassium Level


  4.5 MMOL/L


(3.5-5.1)


 


Chloride Level


  100 MMOL/L


()


 


Carbon Dioxide Level


  28 MMOL/L


(21-32)


 


Anion Gap


  7 mmol/L


(5-15)


 


Blood Urea Nitrogen


  18 mg/dL


(7-18)


 


Creatinine


  1.0 MG/DL


(0.55-1.30)


 


Estimat Glomerular Filtration


Rate > 60 mL/min


(>60)


 


Glucose Level


  115 MG/DL


()  H


 


Calcium Level


  9.2 MG/DL


(8.5-10.1)











Current Medications








 Medications


  (Trade)  Dose


 Ordered  Sig/Herlinda


 Route


 PRN Reason  Start Time


 Stop Time Status Last Admin


Dose Admin


 


 Acetaminophen


  (Tylenol)  650 mg  Q4H  PRN


 ORAL


 fever  7/14/19 08:15


 8/13/19 08:14  7/21/19 05:19


 


 


 Amoxicillin/


 Clavulanate


 Potassium


  (Augmentin)  875 mg  EVERY 12  HOURS


 ORAL


   7/21/19 21:00


 7/28/19 20:59  7/22/19 09:33


 


 


 Dextrose


  (Dextrose 50%)  25 ml  Q30M  PRN


 IV


 Hypoglycemia  7/14/19 08:30


 8/13/19 08:16   


 


 


 Dextrose


  (Dextrose 50%)  50 ml  Q30M  PRN


 IV


 hypoglycemia  7/14/19 08:30


 8/13/19 08:29   


 


 


 Docusate Sodium


  (Colace)  100 mg  THREE TIMES A  DAY


 ORAL


   7/14/19 18:00


 8/13/19 17:59  7/22/19 12:18


 


 


 Folic Acid


  (Folate)  3 mg  DAILY


 ORAL


   7/15/19 09:15


 8/14/19 09:14  7/22/19 09:32


 


 


 Heparin Sodium


  (Porcine)


  (Heparin 5000


 units/ml)  5,000 units  EVERY 12  HOURS


 SUBQ


   7/14/19 09:00


 8/13/19 08:59  7/22/19 09:40


 


 


 Hydromorphone HCl


  (Dilaudid)  1 mg  Q4H  PRN


 IVP


 Severe Pain (Pain Scale 7-10)  7/15/19 22:15


 7/22/19 22:14  7/22/19 09:29


 


 


 Ibuprofen


  (Motrin)  600 mg  TID


 ORAL


   7/17/19 18:00


 8/16/19 17:59  7/22/19 12:19


 


 


 Nitroglycerin


  (Ntg)  0.4 mg  Q5M  PRN


 SL


 Prn Chest Pain  7/14/19 08:15


 8/13/19 08:14   


 


 


 Ondansetron HCl


  (Zofran)  4 mg  Q6H  PRN


 IVP


 Nausea & Vomiting  7/14/19 08:15


 8/13/19 08:14   


 


 


 Pantoprazole


  (Protonix)  40 mg  EVERY 12  HOURS


 ORAL


   7/14/19 21:00


 8/13/19 20:59  7/22/19 09:33


 


 


 Polyethylene


 Glycol


  (Miralax)  17 gm  DAILYPRN  PRN


 ORAL


 Constipation  7/14/19 08:15


 8/13/19 08:14  7/14/19 11:32


 


 


 Potassium Chloride


  (K-Dur)  40 meq  DAILY


 ORAL


   7/18/19 09:00


 8/17/19 08:59  7/22/19 09:32


 


 


 Trimethoprim/


 Sulfamethoxazole


  (Bactrim-DS)  1 tab  TWICE A  DAY


 ORAL


   7/21/19 21:00


 7/28/19 20:59  7/22/19 09:33


 

















Sarah Sinclair M.D. Jul 22, 2019 12:58

## 2019-07-22 NOTE — NUR
NURSE NOTES:

Left leg wound is cleaned with NS and covered with optifoam. No slough noted. Wound is clean 
and red. Patient tolerated dressing change.

## 2019-07-22 NOTE — NUR
SI:SEPSIS . CELLULITIS

          VS: BP 97/68, P 78, T 97.5, RR 18, SpO2 100

         RBC 3.99, H&h 12.0/37.3, Na 135



IS:DILAUDID 1mg IVP

MOTRIN 600mg

HEPARIN SUBQ

BACTRIM-DS

AUGMENTIN 875mg

K-DUR 40meq





******************MED/SURG STATUS****************

## 2019-07-22 NOTE — NUR
NURSE NOTES:

Received pt from RN KI. Pt is alert and orient x4. pt is in RA, No SOB or acute 
respiratory distress noted. pt has intact iv access LFA 20G SL. Pt is eating breakfast 
independently. all needs attended, bed is locked and is in the lowest position. call light 
within easy reach. will continue to monitor.

## 2019-07-22 NOTE — CARDIOLOGY REPORT
--------------- APPROVED REPORT --------------





EKG Measurement

Heart [id number]

SD [id number]

[id number]

[id number]

[id number]





Sinus tachycardia with premature atrial complexes

Biatrial enlargement

Abnormal ECG

## 2019-07-22 NOTE — PULMONOLOGY PROGRESS NOTE
Assessment/Plan


Problems:  


(1) Sepsis


(2) Cellulitis of left foot


Assessment/Plan


improving


doing better


on abx


check cultures


wound care


dvt prophylaxis.





Subjective


ROS Limited/Unobtainable:  No


Constitutional:  Reports: no symptoms


HEENT:  Repors: no symptoms


Respiratory:  Reports: no symptoms


Allergies:  


Coded Allergies:  


     No Known Allergies (Unverified , 7/13/19)





Objective





Last 24 Hour Vital Signs








  Date Time  Temp Pulse Resp B/P (MAP) Pulse Ox O2 Delivery O2 Flow Rate FiO2


 


7/22/19 14:14 97.8       


 


7/22/19 12:49 97.8       


 


7/22/19 12:00 97.0 75 18 101/67 (78) 98   


 


7/22/19 09:00      Room Air  


 


7/22/19 08:00 97.8 73 18 111/69 (83) 100   


 


7/22/19 04:00 97.8 83 19 104/63 (77) 97   


 


7/22/19 00:11 97.8 86 19 112/68 (83) 97   


 


7/21/19 21:00      Room Air  


 


7/21/19 20:00 97.0 83 19 109/64 (79) 97   


 


7/21/19 16:11 99.0 103 19 110/60 (77) 96   

















Intake and Output  


 


 7/21/19 7/22/19





 19:00 07:00


 


Intake Total 1630.0 ml 600 ml


 


Output Total 1200 ml 1700 ml


 


Balance 430.0 ml -1100 ml


 


  


 


Intake Oral 1300 ml 600 ml


 


IV Total 330.0 ml 


 


Output Urine Total 1200 ml 1700 ml


 


# Voids 4 








Objective


General Appearance:  WD/WN


HEENT:  normocephalic, atraumatic


Respiratory/Chest:  chest wall non-tender, lungs clear, normal breath sounds


Breasts:  no masses


Cardiovascular:  normal peripheral pulses, normal rate


Abdomen:  normal bowel sounds, soft, non tender


Genitourinary:  normal external genitalia


Extremities:  no cyanosis, less edema


Laboratory Tests


7/22/19 05:15: 


White Blood Count 9.5, Red Blood Count 3.99L, Hemoglobin 12.0L, Hematocrit 37.3L

, Mean Corpuscular Volume 94, Mean Corpuscular Hemoglobin 30.0, Mean 

Corpuscular Hemoglobin Concent 32.1, Red Cell Distribution Width 12.4, Platelet 

Count 561H, Mean Platelet Volume 4.7L, Neutrophils (%) (Auto) 65.2, Lymphocytes 

(%) (Auto) 23.0, Monocytes (%) (Auto) 9.9, Eosinophils (%) (Auto) 1.2, 

Basophils (%) (Auto) 0.6, Sodium Level 135L, Potassium Level 4.5, Chloride 

Level 100, Carbon Dioxide Level 28, Anion Gap 7, Blood Urea Nitrogen 18, 

Creatinine 1.0, Estimat Glomerular Filtration Rate > 60, Glucose Level 115H, 

Calcium Level 9.2





Current Medications








 Medications


  (Trade)  Dose


 Ordered  Sig/Herlinda


 Route


 PRN Reason  Start Time


 Stop Time Status Last Admin


Dose Admin


 


 Acetaminophen


  (Tylenol)  650 mg  Q4H  PRN


 ORAL


 fever  7/14/19 08:15


 8/13/19 08:14  7/21/19 05:19


 


 


 Amoxicillin/


 Clavulanate


 Potassium


  (Augmentin)  875 mg  EVERY 12  HOURS


 ORAL


   7/21/19 21:00


 7/28/19 20:59  7/22/19 09:33


 


 


 Dextrose


  (Dextrose 50%)  25 ml  Q30M  PRN


 IV


 Hypoglycemia  7/14/19 08:30


 8/13/19 08:16   


 


 


 Dextrose


  (Dextrose 50%)  50 ml  Q30M  PRN


 IV


 hypoglycemia  7/14/19 08:30


 8/13/19 08:29   


 


 


 Docusate Sodium


  (Colace)  100 mg  THREE TIMES A  DAY


 ORAL


   7/14/19 18:00


 8/13/19 17:59  7/22/19 12:18


 


 


 Folic Acid


  (Folate)  3 mg  DAILY


 ORAL


   7/15/19 09:15


 8/14/19 09:14  7/22/19 09:32


 


 


 Heparin Sodium


  (Porcine)


  (Heparin 5000


 units/ml)  5,000 units  EVERY 12  HOURS


 SUBQ


   7/14/19 09:00


 8/13/19 08:59  7/22/19 09:40


 


 


 Hydromorphone HCl


  (Dilaudid)  1 mg  Q4H  PRN


 IVP


 Severe Pain (Pain Scale 7-10)  7/15/19 22:15


 7/22/19 22:14  7/22/19 13:44


 


 


 Ibuprofen


  (Motrin)  600 mg  TID


 ORAL


   7/17/19 18:00


 8/16/19 17:59  7/22/19 12:19


 


 


 Nitroglycerin


  (Ntg)  0.4 mg  Q5M  PRN


 SL


 Prn Chest Pain  7/14/19 08:15


 8/13/19 08:14   


 


 


 Ondansetron HCl


  (Zofran)  4 mg  Q6H  PRN


 IVP


 Nausea & Vomiting  7/14/19 08:15


 8/13/19 08:14   


 


 


 Pantoprazole


  (Protonix)  40 mg  EVERY 12  HOURS


 ORAL


   7/14/19 21:00


 8/13/19 20:59  7/22/19 09:33


 


 


 Polyethylene


 Glycol


  (Miralax)  17 gm  DAILYPRN  PRN


 ORAL


 Constipation  7/14/19 08:15


 8/13/19 08:14  7/14/19 11:32


 


 


 Potassium Chloride


  (K-Dur)  40 meq  DAILY


 ORAL


   7/18/19 09:00


 8/17/19 08:59  7/22/19 09:32


 


 


 Trimethoprim/


 Sulfamethoxazole


  (Bactrim-DS)  1 tab  TWICE A  DAY


 ORAL


   7/21/19 21:00


 7/28/19 20:59  7/22/19 09:33


 

















Pia Coe MD Jul 22, 2019 14:28

## 2019-07-22 NOTE — GENERAL PROGRESS NOTE
Assessment/Plan


Problem List:  


(1) Sepsis


ICD Codes:  A41.9 - Sepsis, unspecified organism


SNOMED:  96003542


(2) Cellulitis of left foot


ICD Codes:  L03.116 - Cellulitis of left lower limb


SNOMED:  919993420


Status:  stable, progressing


Assessment/Plan:


wound care abx pain control cbc bmp am  dc snf if clear





Subjective


Constitutional:  Reports: weakness


Allergies:  


Coded Allergies:  


     No Known Allergies (Unverified , 7/13/19)


All Systems:  reviewed and negative except above


Subjective


sleepy calm





Objective





Last 24 Hour Vital Signs








  Date Time  Temp Pulse Resp B/P (MAP) Pulse Ox O2 Delivery O2 Flow Rate FiO2


 


7/22/19 04:00 97.8 83 19 104/63 (77) 97   


 


7/22/19 00:48 97.8       


 


7/22/19 00:11 97.8 86 19 112/68 (83) 97   


 


7/21/19 21:00      Room Air  


 


7/21/19 20:00 97.0 83 19 109/64 (79) 97   


 


7/21/19 16:11 99.0 103 19 110/60 (77) 96   


 


7/21/19 12:02 98.8 115 19 120/63 (82) 96   

















Intake and Output  


 


 7/21/19 7/22/19





 19:00 07:00


 


Intake Total 1630.0 ml 600 ml


 


Output Total 1200 ml 1700 ml


 


Balance 430.0 ml -1100 ml


 


  


 


Intake Oral 1300 ml 600 ml


 


IV Total 330.0 ml 


 


Output Urine Total 1200 ml 1700 ml


 


# Voids 4 








Laboratory Tests


7/22/19 05:15: 


White Blood Count 9.5, Red Blood Count 3.99L, Hemoglobin 12.0L, Hematocrit 37.3L

, Mean Corpuscular Volume 94, Mean Corpuscular Hemoglobin 30.0, Mean 

Corpuscular Hemoglobin Concent 32.1, Red Cell Distribution Width 12.4, Platelet 

Count 561H, Mean Platelet Volume 4.7L, Neutrophils (%) (Auto) 65.2, Lymphocytes 

(%) (Auto) 23.0, Monocytes (%) (Auto) 9.9, Eosinophils (%) (Auto) 1.2, 

Basophils (%) (Auto) 0.6, Sodium Level 135L, Potassium Level 4.5, Chloride 

Level 100, Carbon Dioxide Level 28, Anion Gap 7, Blood Urea Nitrogen 18, 

Creatinine 1.0, Estimat Glomerular Filtration Rate > 60, Glucose Level 115H, 

Calcium Level 9.2


Height (Feet):  6


Height (Inches):  1.00


Weight (Pounds):  169


General Appearance:  lethargic


EENT:  normal ENT inspection


Neck:  normal alignment


Cardiovascular:  normal peripheral pulses, normal rate, regular rhythm


Respiratory/Chest:  chest wall non-tender, lungs clear, normal breath sounds


Abdomen:  normal bowel sounds, non tender, soft


Extremities:  normal inspection


Edema:  no edema noted Arm (L), no edema noted Arm (R), no edema noted Leg (L), 

no edema noted Leg (R), no edema noted Pedal (L), no edema noted Pedal (R), no 

edema noted Generalized


Neurologic:  motor weakness


Skin:  normal pigmentation, warm/dry


Objective


l foor sl red and swollen up to ankle











Freddy Muñiz DO Jul 22, 2019 09:17

## 2019-07-22 NOTE — NUR
*-* INSURANCE *-*



UPDATED CLINICALS  HAVE BEEN FAXED TO:



[facility]: KATHERYN

P-  [phone]

F ............REVIEW/CLINICAL

## 2019-07-22 NOTE — NEPHROLOGY PROGRESS NOTE
Assessment/Plan


Problem List:  


(1) Cellulitis of left foot


(2) Sepsis


(3) Hyponatremia


(4) Hypoalbuminemia


Assessment


Left foot cellulitis / Leukocytosis  / No fever


Low Na


Low K


Anemia


High Lactic


Low Albumin


Plan











Isotonic solution


K supplement


DC IV fluids


monitor lytes


avoid nephrotoxics


urine tox screen positive for Amphetamines and Opiates


per orders


stable from renal stand if discharged





Subjective


ROS Limited/Unobtainable:  No


Constitutional:  Reports: malaise





Objective


Objective





Last 24 Hour Vital Signs








  Date Time  Temp Pulse Resp B/P (MAP) Pulse Ox O2 Delivery O2 Flow Rate FiO2


 


7/22/19 12:00 97.0 75 18 101/67 (78) 98   


 


7/22/19 10:02 97.8       


 


7/22/19 09:59 97.8       


 


7/22/19 08:00 97.8 73 18 111/69 (83) 100   


 


7/22/19 04:00 97.8 83 19 104/63 (77) 97   


 


7/22/19 00:11 97.8 86 19 112/68 (83) 97   


 


7/21/19 21:00      Room Air  


 


7/21/19 20:00 97.0 83 19 109/64 (79) 97   


 


7/21/19 16:11 99.0 103 19 110/60 (77) 96   

















Intake and Output  


 


 7/21/19 7/22/19





 19:00 07:00


 


Intake Total 1630.0 ml 600 ml


 


Output Total 1200 ml 1700 ml


 


Balance 430.0 ml -1100 ml


 


  


 


Intake Oral 1300 ml 600 ml


 


IV Total 330.0 ml 


 


Output Urine Total 1200 ml 1700 ml


 


# Voids 4 








Laboratory Tests


7/22/19 05:15: 


White Blood Count 9.5, Red Blood Count 3.99L, Hemoglobin 12.0L, Hematocrit 37.3L

, Mean Corpuscular Volume 94, Mean Corpuscular Hemoglobin 30.0, Mean 

Corpuscular Hemoglobin Concent 32.1, Red Cell Distribution Width 12.4, Platelet 

Count 561H, Mean Platelet Volume 4.7L, Neutrophils (%) (Auto) 65.2, Lymphocytes 

(%) (Auto) 23.0, Monocytes (%) (Auto) 9.9, Eosinophils (%) (Auto) 1.2, 

Basophils (%) (Auto) 0.6, Sodium Level 135L, Potassium Level 4.5, Chloride 

Level 100, Carbon Dioxide Level 28, Anion Gap 7, Blood Urea Nitrogen 18, 

Creatinine 1.0, Estimat Glomerular Filtration Rate > 60, Glucose Level 115H, 

Calcium Level 9.2


Height (Feet):  6


Height (Inches):  1.00


Weight (Pounds):  169


General Appearance:  no apparent distress


Objective


no change











Manuel Norton MD Jul 22, 2019 12:40

## 2019-07-23 VITALS — DIASTOLIC BLOOD PRESSURE: 71 MMHG | SYSTOLIC BLOOD PRESSURE: 112 MMHG

## 2019-07-23 VITALS — DIASTOLIC BLOOD PRESSURE: 66 MMHG | SYSTOLIC BLOOD PRESSURE: 130 MMHG

## 2019-07-23 VITALS — DIASTOLIC BLOOD PRESSURE: 58 MMHG | SYSTOLIC BLOOD PRESSURE: 103 MMHG

## 2019-07-23 VITALS — SYSTOLIC BLOOD PRESSURE: 116 MMHG | DIASTOLIC BLOOD PRESSURE: 75 MMHG

## 2019-07-23 VITALS — SYSTOLIC BLOOD PRESSURE: 102 MMHG | DIASTOLIC BLOOD PRESSURE: 69 MMHG

## 2019-07-23 VITALS — DIASTOLIC BLOOD PRESSURE: 73 MMHG | SYSTOLIC BLOOD PRESSURE: 108 MMHG

## 2019-07-23 VITALS — DIASTOLIC BLOOD PRESSURE: 63 MMHG | SYSTOLIC BLOOD PRESSURE: 117 MMHG

## 2019-07-23 LAB
ADD MANUAL DIFF: NO
ANION GAP SERPL CALC-SCNC: 8 MMOL/L (ref 5–15)
BASOPHILS NFR BLD AUTO: 0.5 % (ref 0–2)
BUN SERPL-MCNC: 17 MG/DL (ref 7–18)
CALCIUM SERPL-MCNC: 9.7 MG/DL (ref 8.5–10.1)
CHLORIDE SERPL-SCNC: 99 MMOL/L (ref 98–107)
CO2 SERPL-SCNC: 27 MMOL/L (ref 21–32)
CREAT SERPL-MCNC: 1 MG/DL (ref 0.55–1.3)
EOSINOPHIL NFR BLD AUTO: 1.5 % (ref 0–3)
ERYTHROCYTE [DISTWIDTH] IN BLOOD BY AUTOMATED COUNT: 12.7 % (ref 11.6–14.8)
HCT VFR BLD CALC: 39.2 % (ref 42–52)
HGB BLD-MCNC: 12.7 G/DL (ref 14.2–18)
LYMPHOCYTES NFR BLD AUTO: 23.6 % (ref 20–45)
MCV RBC AUTO: 93 FL (ref 80–99)
MONOCYTES NFR BLD AUTO: 10.6 % (ref 1–10)
NEUTROPHILS NFR BLD AUTO: 63.9 % (ref 45–75)
PLATELET # BLD: 577 K/UL (ref 150–450)
POTASSIUM SERPL-SCNC: 4.8 MMOL/L (ref 3.5–5.1)
RBC # BLD AUTO: 4.21 M/UL (ref 4.7–6.1)
SODIUM SERPL-SCNC: 134 MMOL/L (ref 136–145)
WBC # BLD AUTO: 9.2 K/UL (ref 4.8–10.8)

## 2019-07-23 RX ADMIN — DOCUSATE SODIUM SCH MG: 100 CAPSULE, LIQUID FILLED ORAL at 17:31

## 2019-07-23 RX ADMIN — DOCUSATE SODIUM SCH MG: 100 CAPSULE, LIQUID FILLED ORAL at 09:51

## 2019-07-23 RX ADMIN — AMOXICILLIN AND CLAVULANATE POTASSIUM SCH MG: 875; 125 TABLET, FILM COATED ORAL at 09:51

## 2019-07-23 RX ADMIN — SODIUM CHLORIDE PRN MG: 9 INJECTION, SOLUTION INTRAVENOUS at 19:35

## 2019-07-23 RX ADMIN — SULFAMETHOXAZOLE AND TRIMETHOPRIM SCH TAB: 800; 160 TABLET ORAL at 09:52

## 2019-07-23 RX ADMIN — DOCUSATE SODIUM SCH MG: 100 CAPSULE, LIQUID FILLED ORAL at 12:36

## 2019-07-23 RX ADMIN — HEPARIN SODIUM SCH UNITS: 5000 INJECTION INTRAVENOUS; SUBCUTANEOUS at 09:54

## 2019-07-23 RX ADMIN — AMOXICILLIN AND CLAVULANATE POTASSIUM SCH MG: 875; 125 TABLET, FILM COATED ORAL at 20:39

## 2019-07-23 RX ADMIN — SODIUM CHLORIDE PRN MG: 9 INJECTION, SOLUTION INTRAVENOUS at 10:03

## 2019-07-23 RX ADMIN — SODIUM CHLORIDE PRN MG: 9 INJECTION, SOLUTION INTRAVENOUS at 23:41

## 2019-07-23 RX ADMIN — SODIUM CHLORIDE PRN MG: 9 INJECTION, SOLUTION INTRAVENOUS at 02:07

## 2019-07-23 RX ADMIN — SULFAMETHOXAZOLE AND TRIMETHOPRIM SCH TAB: 800; 160 TABLET ORAL at 20:40

## 2019-07-23 RX ADMIN — SODIUM CHLORIDE PRN MG: 9 INJECTION, SOLUTION INTRAVENOUS at 14:29

## 2019-07-23 RX ADMIN — SODIUM CHLORIDE PRN MG: 9 INJECTION, SOLUTION INTRAVENOUS at 06:03

## 2019-07-23 RX ADMIN — HEPARIN SODIUM SCH UNITS: 5000 INJECTION INTRAVENOUS; SUBCUTANEOUS at 20:44

## 2019-07-23 NOTE — NUR
*-* INSURANCE *-*



UPDATED CLINICALS & Review  HAVE BEEN FAXED TO:



[facility]: KATHERYN

P-  [phone]

F ............REVIEW/CLINICAL

## 2019-07-23 NOTE — NUR
NURSE NOTES:

Received pt from [facility]. Pt is alert and orient x4. pt is in RA, No SOB or acute 
respiratory distress noted. pt has intact iv access LFA 20G SL. Pt is eating breakfast 
independently. all needs attended, bed is locked and is in the lowest position. call light 
within easy reach. will continue to monitor.

## 2019-07-23 NOTE — INFECTIOUS DISEASES PROG NOTE
Assessment/Plan


Assessment/Plan





Assessment/Plan:


58 yo male who presnted to the ED on 7/12/19 with left leg cellulitis.





Left foot cellulitis


    -wound cx: MRSA, GAS, P.  mirabilsi (Gonzalez S), C. freundi (R ancef, otherwise 

S), K. oxytoca (R amp, otherwise S)


    Leukocytosis ;SP


    Low grade fevers; SP


       -Tibia/fibula MRI: Negative for evidence of osteomyelitis. Evidence of 

superficial soft tissue ulcer, marked by a marker at the anteromedial shin 

region.Considerable subcutaneous fat edema. Given stated clinical history, 

probably on the basis of cellulitis. Correlate with clinical findings. No 

evidence of drainable abscess.


      -foot MRI:  Extensive soft tissue edema, as described. This is in keeping 

with stated clinical history of cellulitis. No findings to suggest abscess Very 

superficial fluid collections likely relate to stated clinical history of 

dorsal blisters. No marrow abnormality to suggest acute osteomyelitis 

demonstrated.


      -ANkle MRI:  Patchy areas of marrow edema, as described. Periarticular 

distribution of this is suggestive of arthropathy which may be degenerative, 

inflammatory, or, most likely, on the basis of Charcot-type changes. Per 

technologist, there are no ulcers in the area so osteomyelitis is deemed much 

less likely. Extensive edema of the subcutaneous fat. Given stated clinical 

history of cellulitis most likely on the basis of such. Correlate with clinical 

findings. No drainable


abscess collection demonstrated.


 


        -Foot xray: No acute injury identified. Soft tissue swelling. Multiple 

incidental findings as described











PLAN:





 - Continue PO Bactrim DS 1 tab bid and Augmentin 875/125mg PO bid #3 (abx d # 

10/14) 


       --ok to discharge on above regimen





     -7/21 SP Cefepime #8, IV Vancomycin #8





 - f/u wound cx


 - Monitor CBC and Temps


 -Podiatry f/u





Thank you for this consult. We will continue to follow the patient during this 

hospitalization.





Subjective


Allergies:  


Coded Allergies:  


     No Known Allergies (Unverified , 7/13/19)


Subjective


afebrile >48hrs


no leukocytosis





Objective


Vital Signs





Last 24 Hour Vital Signs








  Date Time  Temp Pulse Resp B/P (MAP) Pulse Ox O2 Delivery O2 Flow Rate FiO2


 


7/23/19 12:00 98.0 64 17 117/63 (81) 98   


 


7/23/19 10:33 97.5       


 


7/23/19 10:22 97.5       


 


7/23/19 09:00      Room Air  


 


7/23/19 08:00 97.5 84 17 116/75 (89) 98   


 


7/23/19 04:08 98.8 80 20 108/73 (85) 98   


 


7/23/19 04:05 98.8 80 20 108/73 (85) 98   


 


7/23/19 00:10 98.0 80 18 112/71 (85) 92   


 


7/22/19 20:17      Room Air  


 


7/22/19 20:09 97.8 80 18 103/74 (84) 98   


 


7/22/19 18:20 97.5       


 


7/22/19 16:00 97.5 78 18 97/68 (78) 97   








Height (Feet):  6


Height (Inches):  1.00


Weight (Pounds):  169


Objective


Gen:  NAD


HEENT: NCAT, MMM, EOMI, PERRL, No Oral lesion, no scleral icterus 


NECK:  full range of motion, supple, no meningismus, No LAD, No JVD


LUNGS:  CTAB, No W/C, No Accessory muscle use


CARDS: RRR, S1, S2, No M/R/G, 


ABD: Soft, NT, ND, No R/G, + BS, No HSM, No Masses


: Deferred


Ext: C/C/E, Pulses 2+ B/L (DP, Rad): LLE swelling with erythema and pain, 

Ulceration present with purulence.


NEURO: A/O x 4, Strength and Sensation Grossly intact


PSYCH: Normal mood and affect


SKIN:  Warm/dry, No rashes





Laboratory Tests








Test


  7/23/19


05:29


 


White Blood Count


  9.2 K/UL


(4.8-10.8)


 


Red Blood Count


  4.21 M/UL


(4.70-6.10)  L


 


Hemoglobin


  12.7 G/DL


(14.2-18.0)  L


 


Hematocrit


  39.2 %


(42.0-52.0)  L


 


Mean Corpuscular Volume 93 FL (80-99)  


 


Mean Corpuscular Hemoglobin


  30.2 PG


(27.0-31.0)


 


Mean Corpuscular Hemoglobin


Concent 32.5 G/DL


(32.0-36.0)


 


Red Cell Distribution Width


  12.7 %


(11.6-14.8)


 


Platelet Count


  577 K/UL


(150-450)  H


 


Mean Platelet Volume


  4.6 FL


(6.5-10.1)  L


 


Neutrophils (%) (Auto)


  63.9 %


(45.0-75.0)


 


Lymphocytes (%) (Auto)


  23.6 %


(20.0-45.0)


 


Monocytes (%) (Auto)


  10.6 %


(1.0-10.0)  H


 


Eosinophils (%) (Auto)


  1.5 %


(0.0-3.0)


 


Basophils (%) (Auto)


  0.5 %


(0.0-2.0)


 


Sodium Level


  134 MMOL/L


(136-145)  L


 


Potassium Level


  4.8 MMOL/L


(3.5-5.1)


 


Chloride Level


  99 MMOL/L


()


 


Carbon Dioxide Level


  27 MMOL/L


(21-32)


 


Anion Gap


  8 mmol/L


(5-15)


 


Blood Urea Nitrogen


  17 mg/dL


(7-18)


 


Creatinine


  1.0 MG/DL


(0.55-1.30)


 


Estimat Glomerular Filtration


Rate > 60 mL/min


(>60)


 


Glucose Level


  93 MG/DL


()


 


Calcium Level


  9.7 MG/DL


(8.5-10.1)











Current Medications








 Medications


  (Trade)  Dose


 Ordered  Sig/Herlinda


 Route


 PRN Reason  Start Time


 Stop Time Status Last Admin


Dose Admin


 


 Acetaminophen


  (Tylenol)  650 mg  Q4H  PRN


 ORAL


 fever  7/14/19 08:15


 8/13/19 08:14  7/21/19 05:19


 


 


 Amoxicillin/


 Clavulanate


 Potassium


  (Augmentin)  875 mg  EVERY 12  HOURS


 ORAL


   7/21/19 21:00


 7/28/19 20:59  7/23/19 09:51


 


 


 Dextrose


  (Dextrose 50%)  25 ml  Q30M  PRN


 IV


 Hypoglycemia  7/14/19 08:30


 8/13/19 08:16   


 


 


 Dextrose


  (Dextrose 50%)  50 ml  Q30M  PRN


 IV


 hypoglycemia  7/14/19 08:30


 8/13/19 08:29   


 


 


 Docusate Sodium


  (Colace)  100 mg  THREE TIMES A  DAY


 ORAL


   7/14/19 18:00


 8/13/19 17:59  7/23/19 12:36


 


 


 Folic Acid


  (Folate)  3 mg  DAILY


 ORAL


   7/15/19 09:15


 8/14/19 09:14  7/23/19 09:51


 


 


 Heparin Sodium


  (Porcine)


  (Heparin 5000


 units/ml)  5,000 units  EVERY 12  HOURS


 SUBQ


   7/14/19 09:00


 8/13/19 08:59  7/23/19 09:54


 


 


 Hydromorphone HCl


  (Dilaudid)  0.5 mg  Q4H  PRN


 IVP


 For Pain  7/22/19 22:45


 7/29/19 22:44  7/23/19 10:03


 


 


 Ibuprofen


  (Motrin)  600 mg  TID


 ORAL


   7/17/19 18:00


 8/16/19 17:59  7/23/19 12:36


 


 


 Nitroglycerin


  (Ntg)  0.4 mg  Q5M  PRN


 SL


 Prn Chest Pain  7/14/19 08:15


 8/13/19 08:14   


 


 


 Ondansetron HCl


  (Zofran)  4 mg  Q6H  PRN


 IVP


 Nausea & Vomiting  7/14/19 08:15


 8/13/19 08:14   


 


 


 Pantoprazole


  (Protonix)  40 mg  EVERY 12  HOURS


 ORAL


   7/14/19 21:00


 8/13/19 20:59  7/23/19 09:51


 


 


 Polyethylene


 Glycol


  (Miralax)  17 gm  DAILYPRN  PRN


 ORAL


 Constipation  7/14/19 08:15


 8/13/19 08:14  7/14/19 11:32


 


 


 Potassium Chloride


  (K-Dur)  40 meq  DAILY


 ORAL


   7/18/19 09:00


 8/17/19 08:59  7/23/19 09:52


 


 


 Trimethoprim/


 Sulfamethoxazole


  (Bactrim-DS)  1 tab  TWICE A  DAY


 ORAL


   7/21/19 21:00


 7/28/19 20:59  7/23/19 09:52


 

















Sarah Sinclair M.D. Jul 23, 2019 13:10

## 2019-07-23 NOTE — GENERAL PROGRESS NOTE
Assessment/Plan


Problem List:  


(1) Sepsis


ICD Codes:  A41.9 - Sepsis, unspecified organism


SNOMED:  24017073


(2) Cellulitis of left foot


ICD Codes:  L03.116 - Cellulitis of left lower limb


SNOMED:  710534913


Status:  stable, progressing


Assessment/Plan:


wound care abx pain control cbc bmp am  dc snf if clear





Subjective


Constitutional:  Reports: weakness


Allergies:  


Coded Allergies:  


     No Known Allergies (Unverified , 7/13/19)


All Systems:  reviewed and negative except above


Subjective


sleepy calm





Objective





Last 24 Hour Vital Signs








  Date Time  Temp Pulse Resp B/P (MAP) Pulse Ox O2 Delivery O2 Flow Rate FiO2


 


7/23/19 14:59 97.5       


 


7/23/19 13:06 97.5       


 


7/23/19 12:00 98.0 64 17 117/63 (81) 98   


 


7/23/19 09:00      Room Air  


 


7/23/19 08:00 97.5 84 17 116/75 (89) 98   


 


7/23/19 04:08 98.8 80 20 108/73 (85) 98   


 


7/23/19 04:05 98.8 80 20 108/73 (85) 98   


 


7/23/19 00:10 98.0 80 18 112/71 (85) 92   


 


7/22/19 20:17      Room Air  


 


7/22/19 20:09 97.8 80 18 103/74 (84) 98   


 


7/22/19 18:20 97.5       

















Intake and Output  


 


 7/22/19 7/23/19





 19:00 07:00


 


Intake Total 600 ml 1000 ml


 


Output Total 2000 ml 1900 ml


 


Balance -1400 ml -900 ml


 


  


 


Intake Oral 600 ml 


 


Other  1000 ml


 


Output Urine Total 2000 ml 1900 ml


 


# Voids 2 








Laboratory Tests


7/23/19 05:29: 


White Blood Count 9.2, Red Blood Count 4.21L, Hemoglobin 12.7L, Hematocrit 39.2L

, Mean Corpuscular Volume 93, Mean Corpuscular Hemoglobin 30.2, Mean 

Corpuscular Hemoglobin Concent 32.5, Red Cell Distribution Width 12.7, Platelet 

Count 577H, Mean Platelet Volume 4.6L, Neutrophils (%) (Auto) 63.9, Lymphocytes 

(%) (Auto) 23.6, Monocytes (%) (Auto) 10.6H, Eosinophils (%) (Auto) 1.5, 

Basophils (%) (Auto) 0.5, Sodium Level 134L, Potassium Level 4.8, Chloride 

Level 99, Carbon Dioxide Level 27, Anion Gap 8, Blood Urea Nitrogen 17, 

Creatinine 1.0, Estimat Glomerular Filtration Rate > 60, Glucose Level 93, 

Calcium Level 9.7


Height (Feet):  6


Height (Inches):  1.00


Weight (Pounds):  169


General Appearance:  lethargic


EENT:  normal ENT inspection


Neck:  normal alignment


Cardiovascular:  normal peripheral pulses, normal rate, regular rhythm


Respiratory/Chest:  chest wall non-tender, lungs clear, normal breath sounds


Abdomen:  normal bowel sounds, non tender, soft


Extremities:  normal inspection


Edema:  1+ Arm (L), 1+ Arm (R), 1+ Leg (L), 1+ Leg (R), 1+ Pedal (L), 1+ Pedal (

R), 1+ Generalized


Edema:  trace edema


Neurologic:  motor weakness


Skin:  normal pigmentation, warm/dry


Objective


l foor sl red and swollen up to ankle











Freddy Muñiz DO Jul 23, 2019 16:02

## 2019-07-23 NOTE — NUR
SI:SEPSIS . CELLULITIS

VS: /65, P 61, T 98.2, RR 18, SpO2 94

RBC 4.21, H&H 12.7/39.2, Na 134



IS:DILAUDID 0.5mg IVP

HEPARIN SUBQ

K-DUR 40meq

AUGMENTIN 875mg

BACTRIM-DS 1tab



PLAN:  DVT PROPHYLAXIS

WOUND CARE

PENDING PLACEMENT



****************MED/SURG STATUS**************

## 2019-07-23 NOTE — NEPHROLOGY PROGRESS NOTE
Assessment/Plan


Problem List:  


(1) Cellulitis of left foot


(2) Sepsis


(3) Hyponatremia


(4) Hypoalbuminemia


Assessment


Left foot cellulitis / Leukocytosis  / No fever


Low Na


Low K


Anemia


High Lactic


Low Albumin


Plan











Isotonic solution


K supplement


DC IV fluids


monitor lytes


avoid nephrotoxics


urine tox screen positive for Amphetamines and Opiates


per orders


stable from renal stand if discharged





Subjective


ROS Limited/Unobtainable:  No





Objective


Objective





Last 24 Hour Vital Signs








  Date Time  Temp Pulse Resp B/P (MAP) Pulse Ox O2 Delivery O2 Flow Rate FiO2


 


7/23/19 12:00 98.0 64 17 117/63 (81) 98   


 


7/23/19 10:33 97.5       


 


7/23/19 10:22 97.5       


 


7/23/19 09:00      Room Air  


 


7/23/19 08:00 97.5 84 17 116/75 (89) 98   


 


7/23/19 04:08 98.8 80 20 108/73 (85) 98   


 


7/23/19 04:05 98.8 80 20 108/73 (85) 98   


 


7/23/19 00:10 98.0 80 18 112/71 (85) 92   


 


7/22/19 20:17      Room Air  


 


7/22/19 20:09 97.8 80 18 103/74 (84) 98   


 


7/22/19 18:20 97.5       


 


7/22/19 16:00 97.5 78 18 97/68 (78) 97   

















Intake and Output  


 


 7/22/19 7/23/19





 19:00 07:00


 


Intake Total 600 ml 1000 ml


 


Output Total 2000 ml 1900 ml


 


Balance -1400 ml -900 ml


 


  


 


Intake Oral 600 ml 


 


Other  1000 ml


 


Output Urine Total 2000 ml 1900 ml


 


# Voids 2 








Laboratory Tests


7/23/19 05:29: 


White Blood Count 9.2, Red Blood Count 4.21L, Hemoglobin 12.7L, Hematocrit 39.2L

, Mean Corpuscular Volume 93, Mean Corpuscular Hemoglobin 30.2, Mean 

Corpuscular Hemoglobin Concent 32.5, Red Cell Distribution Width 12.7, Platelet 

Count 577H, Mean Platelet Volume 4.6L, Neutrophils (%) (Auto) 63.9, Lymphocytes 

(%) (Auto) 23.6, Monocytes (%) (Auto) 10.6H, Eosinophils (%) (Auto) 1.5, 

Basophils (%) (Auto) 0.5, Sodium Level 134L, Potassium Level 4.8, Chloride 

Level 99, Carbon Dioxide Level 27, Anion Gap 8, Blood Urea Nitrogen 17, 

Creatinine 1.0, Estimat Glomerular Filtration Rate > 60, Glucose Level 93, 

Calcium Level 9.7


Height (Feet):  6


Height (Inches):  1.00


Weight (Pounds):  169


General Appearance:  no apparent distress


Cardiovascular:  normal rate


Respiratory/Chest:  decreased breath sounds


Abdomen:  soft


Objective


no change











Manuel Norton MD Jul 23, 2019 13:17

## 2019-07-23 NOTE — NUR
NURSE NOTES:

Patient in bed, awake, alert, verbally responsive. IV in place, patent. Had complaints of 
pain. Dilaudid 0.5 mg PRN was given as ordered. No s/s distress noted. Bed in lowest 
position, call light within reach, bed alarm on. Will continue to monitor.

## 2019-07-23 NOTE — PULMONOLOGY PROGRESS NOTE
Assessment/Plan


Problems:  


(1) Sepsis


(2) Cellulitis of left foot


Assessment/Plan


improving


doing better


on abx


check cultures


wound care


dvt prophylaxis. 


dc planning





Subjective


ROS Limited/Unobtainable:  No


Constitutional:  Reports: no symptoms


HEENT:  Repors: no symptoms


Respiratory:  Reports: no symptoms


Allergies:  


Coded Allergies:  


     No Known Allergies (Unverified , 7/13/19)





Objective





Last 24 Hour Vital Signs








  Date Time  Temp Pulse Resp B/P (MAP) Pulse Ox O2 Delivery O2 Flow Rate FiO2


 


7/23/19 10:33 97.5       


 


7/23/19 10:22 97.5       


 


7/23/19 09:00      Room Air  


 


7/23/19 08:00 97.5 84 17 116/75 (89) 98   


 


7/23/19 04:08 98.8 80 20 108/73 (85) 98   


 


7/23/19 04:05 98.8 80 20 108/73 (85) 98   


 


7/23/19 00:10 98.0 80 18 112/71 (85) 92   


 


7/22/19 20:17      Room Air  


 


7/22/19 20:09 97.8 80 18 103/74 (84) 98   


 


7/22/19 18:20 97.5       


 


7/22/19 16:00 97.5 78 18 97/68 (78) 97   

















Intake and Output  


 


 7/22/19 7/23/19





 19:00 07:00


 


Intake Total 600 ml 1000 ml


 


Output Total 2000 ml 1900 ml


 


Balance -1400 ml -900 ml


 


  


 


Intake Oral 600 ml 


 


Other  1000 ml


 


Output Urine Total 2000 ml 1900 ml


 


# Voids 2 








Objective


General Appearance:  WD/WN


HEENT:  normocephalic, atraumatic


Respiratory/Chest:  chest wall non-tender, lungs clear, normal breath sounds


Breasts:  no masses


Cardiovascular:  normal peripheral pulses, normal rate


Abdomen:  normal bowel sounds, soft, non tender


Genitourinary:  normal external genitalia


Extremities:  no cyanosis, less edema


Laboratory Tests


7/23/19 05:29: 


White Blood Count 9.2, Red Blood Count 4.21L, Hemoglobin 12.7L, Hematocrit 39.2L

, Mean Corpuscular Volume 93, Mean Corpuscular Hemoglobin 30.2, Mean 

Corpuscular Hemoglobin Concent 32.5, Red Cell Distribution Width 12.7, Platelet 

Count 577H, Mean Platelet Volume 4.6L, Neutrophils (%) (Auto) 63.9, Lymphocytes 

(%) (Auto) 23.6, Monocytes (%) (Auto) 10.6H, Eosinophils (%) (Auto) 1.5, 

Basophils (%) (Auto) 0.5, Sodium Level 134L, Potassium Level 4.8, Chloride 

Level 99, Carbon Dioxide Level 27, Anion Gap 8, Blood Urea Nitrogen 17, 

Creatinine 1.0, Estimat Glomerular Filtration Rate > 60, Glucose Level 93, 

Calcium Level 9.7





Current Medications








 Medications


  (Trade)  Dose


 Ordered  Sig/Herlinda


 Route


 PRN Reason  Start Time


 Stop Time Status Last Admin


Dose Admin


 


 Acetaminophen


  (Tylenol)  650 mg  Q4H  PRN


 ORAL


 fever  7/14/19 08:15


 8/13/19 08:14  7/21/19 05:19


 


 


 Amoxicillin/


 Clavulanate


 Potassium


  (Augmentin)  875 mg  EVERY 12  HOURS


 ORAL


   7/21/19 21:00


 7/28/19 20:59  7/23/19 09:51


 


 


 Dextrose


  (Dextrose 50%)  25 ml  Q30M  PRN


 IV


 Hypoglycemia  7/14/19 08:30


 8/13/19 08:16   


 


 


 Dextrose


  (Dextrose 50%)  50 ml  Q30M  PRN


 IV


 hypoglycemia  7/14/19 08:30


 8/13/19 08:29   


 


 


 Docusate Sodium


  (Colace)  100 mg  THREE TIMES A  DAY


 ORAL


   7/14/19 18:00


 8/13/19 17:59  7/23/19 12:36


 


 


 Folic Acid


  (Folate)  3 mg  DAILY


 ORAL


   7/15/19 09:15


 8/14/19 09:14  7/23/19 09:51


 


 


 Heparin Sodium


  (Porcine)


  (Heparin 5000


 units/ml)  5,000 units  EVERY 12  HOURS


 SUBQ


   7/14/19 09:00


 8/13/19 08:59  7/23/19 09:54


 


 


 Hydromorphone HCl


  (Dilaudid)  0.5 mg  Q4H  PRN


 IVP


 For Pain  7/22/19 22:45


 7/29/19 22:44  7/23/19 10:03


 


 


 Ibuprofen


  (Motrin)  600 mg  TID


 ORAL


   7/17/19 18:00


 8/16/19 17:59  7/23/19 12:36


 


 


 Nitroglycerin


  (Ntg)  0.4 mg  Q5M  PRN


 SL


 Prn Chest Pain  7/14/19 08:15


 8/13/19 08:14   


 


 


 Ondansetron HCl


  (Zofran)  4 mg  Q6H  PRN


 IVP


 Nausea & Vomiting  7/14/19 08:15


 8/13/19 08:14   


 


 


 Pantoprazole


  (Protonix)  40 mg  EVERY 12  HOURS


 ORAL


   7/14/19 21:00


 8/13/19 20:59  7/23/19 09:51


 


 


 Polyethylene


 Glycol


  (Miralax)  17 gm  DAILYPRN  PRN


 ORAL


 Constipation  7/14/19 08:15


 8/13/19 08:14  7/14/19 11:32


 


 


 Potassium Chloride


  (K-Dur)  40 meq  DAILY


 ORAL


   7/18/19 09:00


 8/17/19 08:59  7/23/19 09:52


 


 


 Trimethoprim/


 Sulfamethoxazole


  (Bactrim-DS)  1 tab  TWICE A  DAY


 ORAL


   7/21/19 21:00


 7/28/19 20:59  7/23/19 09:52


 

















Pia Coe MD Jul 23, 2019 12:41

## 2019-07-24 VITALS — DIASTOLIC BLOOD PRESSURE: 66 MMHG | SYSTOLIC BLOOD PRESSURE: 99 MMHG

## 2019-07-24 VITALS — SYSTOLIC BLOOD PRESSURE: 127 MMHG | DIASTOLIC BLOOD PRESSURE: 65 MMHG

## 2019-07-24 VITALS — DIASTOLIC BLOOD PRESSURE: 68 MMHG | SYSTOLIC BLOOD PRESSURE: 131 MMHG

## 2019-07-24 VITALS — DIASTOLIC BLOOD PRESSURE: 61 MMHG | SYSTOLIC BLOOD PRESSURE: 113 MMHG

## 2019-07-24 VITALS — SYSTOLIC BLOOD PRESSURE: 106 MMHG | DIASTOLIC BLOOD PRESSURE: 71 MMHG

## 2019-07-24 VITALS — SYSTOLIC BLOOD PRESSURE: 115 MMHG | DIASTOLIC BLOOD PRESSURE: 69 MMHG

## 2019-07-24 LAB
ADD MANUAL DIFF: NO
ANION GAP SERPL CALC-SCNC: 9 MMOL/L (ref 5–15)
BASOPHILS NFR BLD AUTO: 0.7 % (ref 0–2)
BUN SERPL-MCNC: 21 MG/DL (ref 7–18)
CALCIUM SERPL-MCNC: 9.8 MG/DL (ref 8.5–10.1)
CHLORIDE SERPL-SCNC: 99 MMOL/L (ref 98–107)
CO2 SERPL-SCNC: 28 MMOL/L (ref 21–32)
CREAT SERPL-MCNC: 1.1 MG/DL (ref 0.55–1.3)
EOSINOPHIL NFR BLD AUTO: 1.2 % (ref 0–3)
ERYTHROCYTE [DISTWIDTH] IN BLOOD BY AUTOMATED COUNT: 12.8 % (ref 11.6–14.8)
HCT VFR BLD CALC: 39.7 % (ref 42–52)
HGB BLD-MCNC: 12.7 G/DL (ref 14.2–18)
LYMPHOCYTES NFR BLD AUTO: 21.6 % (ref 20–45)
MCV RBC AUTO: 93 FL (ref 80–99)
MONOCYTES NFR BLD AUTO: 12.6 % (ref 1–10)
NEUTROPHILS NFR BLD AUTO: 63.9 % (ref 45–75)
PLATELET # BLD: 566 K/UL (ref 150–450)
POTASSIUM SERPL-SCNC: 4.5 MMOL/L (ref 3.5–5.1)
RBC # BLD AUTO: 4.25 M/UL (ref 4.7–6.1)
SODIUM SERPL-SCNC: 136 MMOL/L (ref 136–145)
WBC # BLD AUTO: 8.3 K/UL (ref 4.8–10.8)

## 2019-07-24 RX ADMIN — HEPARIN SODIUM SCH UNITS: 5000 INJECTION INTRAVENOUS; SUBCUTANEOUS at 08:36

## 2019-07-24 RX ADMIN — DOCUSATE SODIUM SCH MG: 100 CAPSULE, LIQUID FILLED ORAL at 12:18

## 2019-07-24 RX ADMIN — DOCUSATE SODIUM SCH MG: 100 CAPSULE, LIQUID FILLED ORAL at 08:34

## 2019-07-24 RX ADMIN — SODIUM CHLORIDE PRN MG: 9 INJECTION, SOLUTION INTRAVENOUS at 03:44

## 2019-07-24 RX ADMIN — AMOXICILLIN AND CLAVULANATE POTASSIUM SCH MG: 875; 125 TABLET, FILM COATED ORAL at 08:34

## 2019-07-24 RX ADMIN — SODIUM CHLORIDE PRN MG: 9 INJECTION, SOLUTION INTRAVENOUS at 16:20

## 2019-07-24 RX ADMIN — DOCUSATE SODIUM SCH MG: 100 CAPSULE, LIQUID FILLED ORAL at 17:20

## 2019-07-24 RX ADMIN — SODIUM CHLORIDE PRN MG: 9 INJECTION, SOLUTION INTRAVENOUS at 07:54

## 2019-07-24 RX ADMIN — SODIUM CHLORIDE PRN MG: 9 INJECTION, SOLUTION INTRAVENOUS at 07:46

## 2019-07-24 RX ADMIN — AMOXICILLIN AND CLAVULANATE POTASSIUM SCH MG: 875; 125 TABLET, FILM COATED ORAL at 21:20

## 2019-07-24 RX ADMIN — SODIUM CHLORIDE PRN MG: 9 INJECTION, SOLUTION INTRAVENOUS at 20:37

## 2019-07-24 RX ADMIN — SULFAMETHOXAZOLE AND TRIMETHOPRIM SCH TAB: 800; 160 TABLET ORAL at 08:34

## 2019-07-24 RX ADMIN — HEPARIN SODIUM SCH UNITS: 5000 INJECTION INTRAVENOUS; SUBCUTANEOUS at 21:20

## 2019-07-24 RX ADMIN — SODIUM CHLORIDE PRN MG: 9 INJECTION, SOLUTION INTRAVENOUS at 12:19

## 2019-07-24 RX ADMIN — SULFAMETHOXAZOLE AND TRIMETHOPRIM SCH TAB: 800; 160 TABLET ORAL at 21:20

## 2019-07-24 NOTE — NUR
NURSE NOTES:

WHEN NURSE WAS DOING ROUNDS, PATIENT NOTED TO HAVE NEW OPEN WOUND ON LEFT ANKLE. PATIENT 
DENIES TOUCHING ANKLE. WOUND BLEEDING. WOUND PHOTO TAKEN AND UPLOADED. CHARGE NURSE AWARE.

## 2019-07-24 NOTE — NUR
NURSE NOTES:

received report from IMER Frausto. patient in bed. eating breakfast. A&Ox4. no respiratory 
distress noted on room air. no c/o pain at this time. cleared by all consults for discharge. 
planning on for placement. IV on LFA 20 hep lock intact. fall risk. the bed in the lowest 
position. call light within reach. alarm on. will continue to provide plan of care.

## 2019-07-24 NOTE — NUR
NURSE NOTES: RECEIVED PATIENT LYING IN BED, AWAKE, ALERT/ORIENTED X4, VERBALLY RESPONSIVE, 
NO SIGNS AND SYMPTOMS OF ACUTE CARDIO RESPIRATORY DISTRESS/SHORTNESS OF BREATH, DENIES CHEST 
PAIN, NO PERIPHERAL EDEMA NOTED. NO COMPLAINTS OF GI DISCOMFORT, NO N/V/D, CONTINENT OF B/B, 
URINAL/BSC AT BEDSIDE. DRESSINGS DRY AND INTACT TO LEFT LOWER EXTREMITY. SIDE RAILS UP 
X3/BED IN LOWEST POSITION FOR SAFETY. ENCOURAGED PATIENT TO UTILIZE CALL LIGHT FOR 
ASSISTANCE, VERBALIZED UNDERSTANDING. CONTINUE WITH CURRENT PLAN OF CARE. NAD.

## 2019-07-24 NOTE — GENERAL PROGRESS NOTE
Assessment/Plan


Problem List:  


(1) Sepsis


ICD Codes:  A41.9 - Sepsis, unspecified organism


SNOMED:  [id number]


(2) Cellulitis of left foot


ICD Codes:  L03.116 - Cellulitis of left lower limb


SNOMED:  619318227


Status:  stable, progressing


Assessment/Plan:


wound care abx pain control cbc bmp am  dc snf if clear





Subjective


Constitutional:  Reports: weakness


Allergies:  


Coded Allergies:  


     No Known Allergies (Unverified , 7/13/19)


All Systems:  reviewed and negative except above


Subjective


sleepy calm





Objective





Last 24 Hour Vital Signs








  Date Time  Temp Pulse Resp B/P (MAP) Pulse Ox O2 Delivery O2 Flow Rate FiO2


 


7/24/19 04:14 98.1       


 


7/24/19 04:00 98.1 61 18 113/61 (78) 96   


 


7/24/19 00:59 98.1 84 18 127/65 (85) 97   


 


7/23/19 22:18      Room Air  


 


7/23/19 20:00 98.4 83 18 103/58 (73) 98   


 


7/23/19 18:02 98.2       


 


7/23/19 16:00 98.2 79 16 102/69 (80) 96   


 


7/23/19 12:00 98.0 64 17 117/63 (81) 98   

















Intake and Output  


 


 7/23/19 7/24/19





 19:00 07:00


 


Intake Total 600 ml 320 ml


 


Output Total  1500 ml


 


Balance 600 ml -1180 ml


 


  


 


Intake Oral 600 ml 320 ml


 


Output Urine Total  1500 ml








Laboratory Tests


7/24/19 06:00: 


White Blood Count 8.3, Red Blood Count 4.25L, Hemoglobin 12.7L, Hematocrit 39.7L

, Mean Corpuscular Volume 93, Mean Corpuscular Hemoglobin 29.9, Mean 

Corpuscular Hemoglobin Concent 32.0, Red Cell Distribution Width 12.8, Platelet 

Count 566H, Mean Platelet Volume 4.8L, Neutrophils (%) (Auto) 63.9, Lymphocytes 

(%) (Auto) 21.6, Monocytes (%) (Auto) 12.6H, Eosinophils (%) (Auto) 1.2, 

Basophils (%) (Auto) 0.7, Sodium Level 136, Potassium Level 4.5, Chloride Level 

99, Carbon Dioxide Level 28, Anion Gap 9, Blood Urea Nitrogen 21H, Creatinine 

1.1, Estimat Glomerular Filtration Rate > 60, Glucose Level 104, Calcium Level 

9.8


Height (Feet):  6


Height (Inches):  1.00


Weight (Pounds):  155


General Appearance:  lethargic


EENT:  normal ENT inspection


Neck:  normal alignment


Cardiovascular:  normal peripheral pulses, normal rate, regular rhythm


Respiratory/Chest:  chest wall non-tender, lungs clear, normal breath sounds


Abdomen:  normal bowel sounds, non tender, soft


Extremities:  normal inspection


Edema:  1+ Arm (L), 1+ Arm (R), 1+ Leg (L), 1+ Leg (R), 1+ Pedal (L), 1+ Pedal (

R), 1+ Generalized


Edema:  trace edema


Neurologic:  motor weakness


Skin:  normal pigmentation, warm/dry


Objective


l foor sl red and swollen up to ankle











Freddy Muñiz DO Jul 24, 2019 09:15

## 2019-07-24 NOTE — NUR
NURSE NOTES: PATIENT STATED THAT DILAUDID 0.5MG IS NOT EFFECTIVE AFTER 2 HOURS, REQUESTING 
INCREASE IN DOSE; TELEPHONE CALL PLACED TO DR. BROCK JIANG, LEFT MESSAGE WITH [facility] EXCHANGE 
, WILL AWAIT RETURN CALL. PATIENT MADE AWARE. NAD.

## 2019-07-24 NOTE — PULMONOLOGY PROGRESS NOTE
Assessment/Plan


Problems:  


(1) Sepsis


(2) Cellulitis of left foot


Assessment/Plan


improving


doing better


on abx


check cultures


wound care


dvt prophylaxis. 


dc planning





Subjective


ROS Limited/Unobtainable:  No


Constitutional:  Reports: no symptoms


HEENT:  Repors: no symptoms


Respiratory:  Reports: no symptoms


Allergies:  


Coded Allergies:  


     No Known Allergies (Unverified , 7/13/19)





Objective





Last 24 Hour Vital Signs








  Date Time  Temp Pulse Resp B/P (MAP) Pulse Ox O2 Delivery O2 Flow Rate FiO2


 


7/24/19 09:00      Room Air  


 


7/24/19 08:00 97.8 89 18 115/69 (84) 94   


 


7/24/19 04:14 98.1       


 


7/24/19 04:00 98.1 61 18 113/61 (78) 96   


 


7/24/19 00:59 98.1 84 18 127/65 (85) 97   


 


7/23/19 22:18      Room Air  


 


7/23/19 20:00 98.4 83 18 103/58 (73) 98   


 


7/23/19 18:02 98.2       


 


7/23/19 16:00 98.2 79 16 102/69 (80) 96   


 


7/23/19 12:00 98.0 64 17 117/63 (81) 98   

















Intake and Output  


 


 7/23/19 7/24/19





 19:00 07:00


 


Intake Total 600 ml 320 ml


 


Output Total  1500 ml


 


Balance 600 ml -1180 ml


 


  


 


Intake Oral 600 ml 320 ml


 


Output Urine Total  1500 ml








Objective


General Appearance:  WD/WN


HEENT:  normocephalic, atraumatic


Respiratory/Chest:  chest wall non-tender, lungs clear, normal breath sounds


Breasts:  no masses


Cardiovascular:  normal peripheral pulses, normal rate


Abdomen:  normal bowel sounds, soft, non tender


Genitourinary:  normal external genitalia


Extremities:  no cyanosis, less edema


Laboratory Tests


7/24/19 06:00: 


White Blood Count 8.3, Red Blood Count 4.25L, Hemoglobin 12.7L, Hematocrit 39.7L

, Mean Corpuscular Volume 93, Mean Corpuscular Hemoglobin 29.9, Mean 

Corpuscular Hemoglobin Concent 32.0, Red Cell Distribution Width 12.8, Platelet 

Count 566H, Mean Platelet Volume 4.8L, Neutrophils (%) (Auto) 63.9, Lymphocytes 

(%) (Auto) 21.6, Monocytes (%) (Auto) 12.6H, Eosinophils (%) (Auto) 1.2, 

Basophils (%) (Auto) 0.7, Sodium Level 136, Potassium Level 4.5, Chloride Level 

99, Carbon Dioxide Level 28, Anion Gap 9, Blood Urea Nitrogen 21H, Creatinine 

1.1, Estimat Glomerular Filtration Rate > 60, Glucose Level 104, Calcium Level 

9.8





Current Medications








 Medications


  (Trade)  Dose


 Ordered  Sig/Herlinda


 Route


 PRN Reason  Start Time


 Stop Time Status Last Admin


Dose Admin


 


 Acetaminophen


  (Tylenol)  650 mg  Q4H  PRN


 ORAL


 fever  7/14/19 08:15


 8/13/19 08:14  7/21/19 05:19


 


 


 Amoxicillin/


 Clavulanate


 Potassium


  (Augmentin)  875 mg  EVERY 12  HOURS


 ORAL


   7/21/19 21:00


 7/28/19 20:59  7/24/19 08:34


 


 


 Dextrose


  (Dextrose 50%)  25 ml  Q30M  PRN


 IV


 Hypoglycemia  7/14/19 08:30


 8/13/19 08:16   


 


 


 Dextrose


  (Dextrose 50%)  50 ml  Q30M  PRN


 IV


 hypoglycemia  7/14/19 08:30


 8/13/19 08:29   


 


 


 Docusate Sodium


  (Colace)  100 mg  THREE TIMES A  DAY


 ORAL


   7/14/19 18:00


 8/13/19 17:59  7/24/19 08:34


 


 


 Folic Acid


  (Folate)  3 mg  DAILY


 ORAL


   7/15/19 09:15


 8/14/19 09:14  7/24/19 08:34


 


 


 Heparin Sodium


  (Porcine)


  (Heparin 5000


 units/ml)  5,000 units  EVERY 12  HOURS


 SUBQ


   7/14/19 09:00


 8/13/19 08:59  7/24/19 08:36


 


 


 Hydromorphone HCl


  (Dilaudid)  0.5 mg  Q4H  PRN


 IVP


 For Pain  7/22/19 22:45


 7/29/19 22:44  7/24/19 07:54


 


 


 Ibuprofen


  (Motrin)  600 mg  TID


 ORAL


   7/17/19 18:00


 8/16/19 17:59  7/24/19 08:35


 


 


 Nitroglycerin


  (Ntg)  0.4 mg  Q5M  PRN


 SL


 Prn Chest Pain  7/14/19 08:15


 8/13/19 08:14   


 


 


 Ondansetron HCl


  (Zofran)  4 mg  Q6H  PRN


 IVP


 Nausea & Vomiting  7/14/19 08:15


 8/13/19 08:14   


 


 


 Pantoprazole


  (Protonix)  40 mg  EVERY 12  HOURS


 ORAL


   7/14/19 21:00


 8/13/19 20:59  7/24/19 08:34


 


 


 Polyethylene


 Glycol


  (Miralax)  17 gm  DAILYPRN  PRN


 ORAL


 Constipation  7/14/19 08:15


 8/13/19 08:14  7/14/19 11:32


 


 


 Potassium Chloride


  (K-Dur)  40 meq  DAILY


 ORAL


   7/18/19 09:00


 8/17/19 08:59  7/24/19 08:34


 


 


 Trimethoprim/


 Sulfamethoxazole


  (Bactrim-DS)  1 tab  Q12HR


 ORAL


   7/23/19 21:00


 7/28/19 20:59  7/24/19 08:34


 

















Pia Coe MD Jul 24, 2019 11:31

## 2019-07-24 NOTE — INFECTIOUS DISEASES PROG NOTE
Assessment/Plan


Assessment/Plan





Assessment/Plan:


60 yo male who presnted to the ED on 7/12/19 with left leg cellulitis.





Left foot cellulitis


    -wound cx: MRSA, GAS, P.  mirabilsi (Gonzalez S), C. freundi (R ancef, otherwise 

S), K. oxytoca (R amp, otherwise S)


    Leukocytosis ;SP


    Low grade fevers; SP


       -Tibia/fibula MRI: Negative for evidence of osteomyelitis. Evidence of 

superficial soft tissue ulcer, marked by a marker at the anteromedial shin 

region.Considerable subcutaneous fat edema. Given stated clinical history, 

probably on the basis of cellulitis. Correlate with clinical findings. No 

evidence of drainable abscess.


      -foot MRI:  Extensive soft tissue edema, as described. This is in keeping 

with stated clinical history of cellulitis. No findings to suggest abscess Very 

superficial fluid collections likely relate to stated clinical history of 

dorsal blisters. No marrow abnormality to suggest acute osteomyelitis 

demonstrated.


      -ANkle MRI:  Patchy areas of marrow edema, as described. Periarticular 

distribution of this is suggestive of arthropathy which may be degenerative, 

inflammatory, or, most likely, on the basis of Charcot-type changes. Per 

technologist, there are no ulcers in the area so osteomyelitis is deemed much 

less likely. Extensive edema of the subcutaneous fat. Given stated clinical 

history of cellulitis most likely on the basis of such. Correlate with clinical 

findings. No drainable


abscess collection demonstrated.


 


        -Foot xray: No acute injury identified. Soft tissue swelling. Multiple 

incidental findings as described











PLAN:





 - Continue PO Bactrim DS 1 tab bid and Augmentin 875/125mg PO bid #4 (abx d # 

11/14) 


       --ok to discharge on above regimen





     -7/21 SP Cefepime #8, IV Vancomycin #8





 - f/u wound cx


 - Monitor CBC and Temps


 -Podiatry f/u





Thank you for this consult. We will continue to follow the patient during this 

hospitalization.





Subjective


Allergies:  


Coded Allergies:  


     No Known Allergies (Unverified , 7/13/19)


Subjective


afebrile >72hrs


no leukocytosis





Objective


Vital Signs





Last 24 Hour Vital Signs








  Date Time  Temp Pulse Resp B/P (MAP) Pulse Ox O2 Delivery O2 Flow Rate FiO2


 


7/24/19 12:00 98.2 78 17 106/71 (83) 97   


 


7/24/19 09:00      Room Air  


 


7/24/19 08:00 97.8 89 18 115/69 (84) 94   


 


7/24/19 04:14 98.1       


 


7/24/19 04:00 98.1 61 18 113/61 (78) 96   


 


7/24/19 00:59 98.1 84 18 127/65 (85) 97   


 


7/23/19 22:18      Room Air  


 


7/23/19 20:00 98.4 83 18 103/58 (73) 98   


 


7/23/19 18:02 98.2       


 


7/23/19 16:00 98.2 79 16 102/69 (80) 96   








Height (Feet):  6


Height (Inches):  1.00


Weight (Pounds):  155


Objective


Gen:  NAD


HEENT: NCAT, MMM, EOMI, PERRL, No Oral lesion, no scleral icterus 


NECK:  full range of motion, supple, no meningismus, No LAD, No JVD


LUNGS:  CTAB, No W/C, No Accessory muscle use


CARDS: RRR, S1, S2, No M/R/G, 


ABD: Soft, NT, ND, No R/G, + BS, No HSM, No Masses


: Deferred


Ext: C/C/E, Pulses 2+ B/L (DP, Rad): LLE swelling with erythema and pain, 

Ulceration present with purulence.


NEURO: A/O x 4, Strength and Sensation Grossly intact


PSYCH: Normal mood and affect


SKIN:  Warm/dry, No rashes





Laboratory Tests








Test


  7/24/19


06:00


 


White Blood Count


  8.3 K/UL


(4.8-10.8)


 


Red Blood Count


  4.25 M/UL


(4.70-6.10)  L


 


Hemoglobin


  12.7 G/DL


(14.2-18.0)  L


 


Hematocrit


  39.7 %


(42.0-52.0)  L


 


Mean Corpuscular Volume 93 FL (80-99)  


 


Mean Corpuscular Hemoglobin


  29.9 PG


(27.0-31.0)


 


Mean Corpuscular Hemoglobin


Concent 32.0 G/DL


(32.0-36.0)


 


Red Cell Distribution Width


  12.8 %


(11.6-14.8)


 


Platelet Count


  566 K/UL


(150-450)  H


 


Mean Platelet Volume


  4.8 FL


(6.5-10.1)  L


 


Neutrophils (%) (Auto)


  63.9 %


(45.0-75.0)


 


Lymphocytes (%) (Auto)


  21.6 %


(20.0-45.0)


 


Monocytes (%) (Auto)


  12.6 %


(1.0-10.0)  H


 


Eosinophils (%) (Auto)


  1.2 %


(0.0-3.0)


 


Basophils (%) (Auto)


  0.7 %


(0.0-2.0)


 


Sodium Level


  136 MMOL/L


(136-145)


 


Potassium Level


  4.5 MMOL/L


(3.5-5.1)


 


Chloride Level


  99 MMOL/L


()


 


Carbon Dioxide Level


  28 MMOL/L


(21-32)


 


Anion Gap


  9 mmol/L


(5-15)


 


Blood Urea Nitrogen


  21 mg/dL


(7-18)  H


 


Creatinine


  1.1 MG/DL


(0.55-1.30)


 


Estimat Glomerular Filtration


Rate > 60 mL/min


(>60)


 


Glucose Level


  104 MG/DL


()


 


Calcium Level


  9.8 MG/DL


(8.5-10.1)











Current Medications








 Medications


  (Trade)  Dose


 Ordered  Sig/Herlinda


 Route


 PRN Reason  Start Time


 Stop Time Status Last Admin


Dose Admin


 


 Acetaminophen


  (Tylenol)  650 mg  Q4H  PRN


 ORAL


 fever  7/14/19 08:15


 8/13/19 08:14  7/21/19 05:19


 


 


 Amoxicillin/


 Clavulanate


 Potassium


  (Augmentin)  875 mg  EVERY 12  HOURS


 ORAL


   7/21/19 21:00


 7/28/19 20:59  7/24/19 08:34


 


 


 Dextrose


  (Dextrose 50%)  25 ml  Q30M  PRN


 IV


 Hypoglycemia  7/14/19 08:30


 8/13/19 08:16   


 


 


 Dextrose


  (Dextrose 50%)  50 ml  Q30M  PRN


 IV


 hypoglycemia  7/14/19 08:30


 8/13/19 08:29   


 


 


 Docusate Sodium


  (Colace)  100 mg  THREE TIMES A  DAY


 ORAL


   7/14/19 18:00


 8/13/19 17:59  7/24/19 12:18


 


 


 Folic Acid


  (Folate)  3 mg  DAILY


 ORAL


   7/15/19 09:15


 8/14/19 09:14  7/24/19 08:34


 


 


 Heparin Sodium


  (Porcine)


  (Heparin 5000


 units/ml)  5,000 units  EVERY 12  HOURS


 SUBQ


   7/14/19 09:00


 8/13/19 08:59  7/24/19 08:36


 


 


 Hydromorphone HCl


  (Dilaudid)  0.5 mg  Q4H  PRN


 IVP


 For Pain  7/22/19 22:45


 7/29/19 22:44  7/24/19 12:19


 


 


 Ibuprofen


  (Motrin)  600 mg  TID


 ORAL


   7/17/19 18:00


 8/16/19 17:59  7/24/19 12:19


 


 


 Nitroglycerin


  (Ntg)  0.4 mg  Q5M  PRN


 SL


 Prn Chest Pain  7/14/19 08:15


 8/13/19 08:14   


 


 


 Ondansetron HCl


  (Zofran)  4 mg  Q6H  PRN


 IVP


 Nausea & Vomiting  7/14/19 08:15


 8/13/19 08:14   


 


 


 Pantoprazole


  (Protonix)  40 mg  EVERY 12  HOURS


 ORAL


   7/14/19 21:00


 8/13/19 20:59  7/24/19 08:34


 


 


 Polyethylene


 Glycol


  (Miralax)  17 gm  DAILYPRN  PRN


 ORAL


 Constipation  7/14/19 08:15


 8/13/19 08:14  7/14/19 11:32


 


 


 Potassium Chloride


  (K-Dur)  40 meq  DAILY


 ORAL


   7/18/19 09:00


 8/17/19 08:59  7/24/19 08:34


 


 


 Trimethoprim/


 Sulfamethoxazole


  (Bactrim-DS)  1 tab  Q12HR


 ORAL


   7/23/19 21:00


 7/28/19 20:59  7/24/19 08:34


 

















Sarah Sinclair M.D. Jul 24, 2019 13:41

## 2019-07-24 NOTE — NEPHROLOGY PROGRESS NOTE
Assessment/Plan


Problem List:  


(1) Cellulitis of left foot


(2) Sepsis


(3) Hyponatremia


(4) Hypoalbuminemia


Assessment


Left foot cellulitis / Leukocytosis  / No fever


Low Na


Low K


Anemia


High Lactic


Low Albumin


Plan











Isotonic solution


K supplement


DC IV fluids


monitor lytes


avoid nephrotoxics


urine tox screen positive for Amphetamines and Opiates


per orders


stable from renal stand if discharged





Subjective


ROS Limited/Unobtainable:  No





Objective


Objective





Last 24 Hour Vital Signs








  Date Time  Temp Pulse Resp B/P (MAP) Pulse Ox O2 Delivery O2 Flow Rate FiO2


 


7/24/19 12:00 98.2 78 17 106/71 (83) 97   


 


7/24/19 09:00      Room Air  


 


7/24/19 08:00 97.8 89 18 115/69 (84) 94   


 


7/24/19 04:14 98.1       


 


7/24/19 04:00 98.1 61 18 113/61 (78) 96   


 


7/24/19 00:59 98.1 84 18 127/65 (85) 97   


 


7/23/19 22:18      Room Air  


 


7/23/19 20:00 98.4 83 18 103/58 (73) 98   


 


7/23/19 18:02 98.2       


 


7/23/19 16:00 98.2 79 16 102/69 (80) 96   

















Intake and Output  


 


 7/23/19 7/24/19





 19:00 07:00


 


Intake Total 600 ml 320 ml


 


Output Total  1500 ml


 


Balance 600 ml -1180 ml


 


  


 


Intake Oral 600 ml 320 ml


 


Output Urine Total  1500 ml








Laboratory Tests


7/24/19 06:00: 


White Blood Count 8.3, Red Blood Count 4.25L, Hemoglobin 12.7L, Hematocrit 39.7L

, Mean Corpuscular Volume 93, Mean Corpuscular Hemoglobin 29.9, Mean 

Corpuscular Hemoglobin Concent 32.0, Red Cell Distribution Width 12.8, Platelet 

Count 566H, Mean Platelet Volume 4.8L, Neutrophils (%) (Auto) 63.9, Lymphocytes 

(%) (Auto) 21.6, Monocytes (%) (Auto) 12.6H, Eosinophils (%) (Auto) 1.2, 

Basophils (%) (Auto) 0.7, Sodium Level 136, Potassium Level 4.5, Chloride Level 

99, Carbon Dioxide Level 28, Anion Gap 9, Blood Urea Nitrogen 21H, Creatinine 

1.1, Estimat Glomerular Filtration Rate > 60, Glucose Level 104, Calcium Level 

9.8


Height (Feet):  6


Height (Inches):  1.00


Weight (Pounds):  155


General Appearance:  no apparent distress


Objective


no change











Manuel Norton MD Jul 24, 2019 15:07

## 2019-07-25 VITALS — SYSTOLIC BLOOD PRESSURE: 132 MMHG | DIASTOLIC BLOOD PRESSURE: 74 MMHG

## 2019-07-25 VITALS — DIASTOLIC BLOOD PRESSURE: 75 MMHG | SYSTOLIC BLOOD PRESSURE: 102 MMHG

## 2019-07-25 VITALS — SYSTOLIC BLOOD PRESSURE: 100 MMHG | DIASTOLIC BLOOD PRESSURE: 59 MMHG

## 2019-07-25 VITALS — SYSTOLIC BLOOD PRESSURE: 108 MMHG | DIASTOLIC BLOOD PRESSURE: 58 MMHG

## 2019-07-25 VITALS — DIASTOLIC BLOOD PRESSURE: 63 MMHG | SYSTOLIC BLOOD PRESSURE: 111 MMHG

## 2019-07-25 VITALS — SYSTOLIC BLOOD PRESSURE: 117 MMHG | DIASTOLIC BLOOD PRESSURE: 69 MMHG

## 2019-07-25 LAB
ADD MANUAL DIFF: NO
ANION GAP SERPL CALC-SCNC: 8 MMOL/L (ref 5–15)
BASOPHILS NFR BLD AUTO: 0.7 % (ref 0–2)
BUN SERPL-MCNC: 20 MG/DL (ref 7–18)
CALCIUM SERPL-MCNC: 9.8 MG/DL (ref 8.5–10.1)
CHLORIDE SERPL-SCNC: 100 MMOL/L (ref 98–107)
CO2 SERPL-SCNC: 29 MMOL/L (ref 21–32)
CREAT SERPL-MCNC: 1.1 MG/DL (ref 0.55–1.3)
EOSINOPHIL NFR BLD AUTO: 0.9 % (ref 0–3)
ERYTHROCYTE [DISTWIDTH] IN BLOOD BY AUTOMATED COUNT: 13.3 % (ref 11.6–14.8)
HCT VFR BLD CALC: 41.7 % (ref 42–52)
HGB BLD-MCNC: 13.2 G/DL (ref 14.2–18)
LYMPHOCYTES NFR BLD AUTO: 32.8 % (ref 20–45)
MCV RBC AUTO: 95 FL (ref 80–99)
MONOCYTES NFR BLD AUTO: 5.6 % (ref 1–10)
NEUTROPHILS NFR BLD AUTO: 60 % (ref 45–75)
PLATELET # BLD: 560 K/UL (ref 150–450)
POTASSIUM SERPL-SCNC: 4.7 MMOL/L (ref 3.5–5.1)
RBC # BLD AUTO: 4.41 M/UL (ref 4.7–6.1)
SODIUM SERPL-SCNC: 136 MMOL/L (ref 136–145)
WBC # BLD AUTO: 6.6 K/UL (ref 4.8–10.8)

## 2019-07-25 RX ADMIN — DOCUSATE SODIUM SCH MG: 100 CAPSULE, LIQUID FILLED ORAL at 17:10

## 2019-07-25 RX ADMIN — SULFAMETHOXAZOLE AND TRIMETHOPRIM SCH TAB: 800; 160 TABLET ORAL at 20:41

## 2019-07-25 RX ADMIN — HEPARIN SODIUM SCH UNITS: 5000 INJECTION INTRAVENOUS; SUBCUTANEOUS at 08:57

## 2019-07-25 RX ADMIN — DOCUSATE SODIUM SCH MG: 100 CAPSULE, LIQUID FILLED ORAL at 13:25

## 2019-07-25 RX ADMIN — HYDROCODONE BITARTRATE AND ACETAMINOPHEN PRN TAB: 10; 325 TABLET ORAL at 13:26

## 2019-07-25 RX ADMIN — AMOXICILLIN AND CLAVULANATE POTASSIUM SCH MG: 875; 125 TABLET, FILM COATED ORAL at 20:40

## 2019-07-25 RX ADMIN — DOCUSATE SODIUM SCH MG: 100 CAPSULE, LIQUID FILLED ORAL at 08:56

## 2019-07-25 RX ADMIN — SODIUM CHLORIDE PRN MG: 9 INJECTION, SOLUTION INTRAVENOUS at 00:45

## 2019-07-25 RX ADMIN — HEPARIN SODIUM SCH UNITS: 5000 INJECTION INTRAVENOUS; SUBCUTANEOUS at 20:44

## 2019-07-25 RX ADMIN — HYDROCODONE BITARTRATE AND ACETAMINOPHEN PRN TAB: 10; 325 TABLET ORAL at 22:11

## 2019-07-25 RX ADMIN — SULFAMETHOXAZOLE AND TRIMETHOPRIM SCH TAB: 800; 160 TABLET ORAL at 08:55

## 2019-07-25 RX ADMIN — AMOXICILLIN AND CLAVULANATE POTASSIUM SCH MG: 875; 125 TABLET, FILM COATED ORAL at 08:56

## 2019-07-25 RX ADMIN — SODIUM CHLORIDE PRN MG: 9 INJECTION, SOLUTION INTRAVENOUS at 05:22

## 2019-07-25 RX ADMIN — SODIUM CHLORIDE PRN MG: 9 INJECTION, SOLUTION INTRAVENOUS at 09:24

## 2019-07-25 RX ADMIN — HYDROCODONE BITARTRATE AND ACETAMINOPHEN PRN TAB: 10; 325 TABLET ORAL at 18:00

## 2019-07-25 NOTE — NUR
NURSE NOTES:

received report from IMER Levin. patient in bed. alert. oriented.  no respiratory distress 
noted. c/o discomfort on lt lower leg. dressing intact and dry.  IV LFA 20 hep lock intact. 
contact isolation from active mrsa wound. fall risk. bed in the lowest position and locked. 
call light within reach. alarm on. will continue to provide plan of care.

## 2019-07-25 NOTE — NUR
*-* INSURANCE *-*



UPDATED CLINICALS AND REVIEWS HAVE BEEN FAXED TO:



[facility]: KATHERYN

P-  [phone]

F ............REVIEW/CLINICAL

## 2019-07-25 NOTE — NUR
NURSE NOTES: NO SIGNIFICANT CHANGE OF CONDITION NOTED THROUGHOUT THE NIGHT. SAFETY 
MAINTAINED. NAD.

## 2019-07-25 NOTE — INFECTIOUS DISEASES PROG NOTE
Assessment/Plan


Assessment/Plan





Assessment/Plan:


60 yo male who presnted to the ED on 7/12/19 with left leg cellulitis.





Left foot cellulitis


    -wound cx: MRSA, GAS, P.  mirabilsi (Gonzalez S), C. freundi (R ancef, otherwise 

S), K. oxytoca (R amp, otherwise S)


    Leukocytosis ;SP


    Low grade fevers; SP


       -Tibia/fibula MRI: Negative for evidence of osteomyelitis. Evidence of 

superficial soft tissue ulcer, marked by a marker at the anteromedial shin 

region.Considerable subcutaneous fat edema. Given stated clinical history, 

probably on the basis of cellulitis. Correlate with clinical findings. No 

evidence of drainable abscess.


      -foot MRI:  Extensive soft tissue edema, as described. This is in keeping 

with stated clinical history of cellulitis. No findings to suggest abscess Very 

superficial fluid collections likely relate to stated clinical history of 

dorsal blisters. No marrow abnormality to suggest acute osteomyelitis 

demonstrated.


      -ANkle MRI:  Patchy areas of marrow edema, as described. Periarticular 

distribution of this is suggestive of arthropathy which may be degenerative, 

inflammatory, or, most likely, on the basis of Charcot-type changes. Per 

technologist, there are no ulcers in the area so osteomyelitis is deemed much 

less likely. Extensive edema of the subcutaneous fat. Given stated clinical 

history of cellulitis most likely on the basis of such. Correlate with clinical 

findings. No drainable


abscess collection demonstrated.


 


        -Foot xray: No acute injury identified. Soft tissue swelling. Multiple 

incidental findings as described











PLAN:





 - Continue PO Bactrim DS 1 tab bid and Augmentin 875/125mg PO bid #5 (abx d # 

12/14) 


       --ok to discharge on above regimen





     -7/21 SP Cefepime #8, IV Vancomycin #8





 - f/u wound cx


 - Monitor CBC and Temps


 -Podiatry f/u





Thank you for this consult. We will continue to follow the patient during this 

hospitalization.





Subjective


Allergies:  


Coded Allergies:  


     No Known Allergies (Unverified , 7/13/19)


Subjective


afebrile


no leukocytosis 


awaiting placement





Objective


Vital Signs





Last 24 Hour Vital Signs








  Date Time  Temp Pulse Resp B/P (MAP) Pulse Ox O2 Delivery O2 Flow Rate FiO2


 


7/25/19 09:00      Room Air  


 


7/25/19 08:00 98.5 82 19 117/69 (85) 97   


 


7/25/19 05:52 98.3       


 


7/25/19 04:00 98.3 88 18 102/75 (84) 96   


 


7/25/19 00:00 97.5 77 18 100/59 (73) 98   


 


7/24/19 21:00      Room Air  


 


7/24/19 20:00 97.7 83 18 99/66 (77) 97   


 


7/24/19 16:00 98.5 95 16 131/68 (89) 96   


 


7/24/19 12:00 98.2 78 17 106/71 (83) 97   








Height (Feet):  6


Height (Inches):  1.00


Weight (Pounds):  155


Objective


Gen:  NAD


HEENT: NCAT, MMM, EOMI, PERRL, No Oral lesion, no scleral icterus 


NECK:  full range of motion, supple, no meningismus, No LAD, No JVD


LUNGS:  CTAB, No W/C, No Accessory muscle use


CARDS: RRR, S1, S2, No M/R/G, 


ABD: Soft, NT, ND, No R/G, + BS, No HSM, No Masses


: Deferred


Ext: C/C/E, Pulses 2+ B/L (DP, Rad): LLE swelling with erythema and pain, 

Ulceration present with purulence.


NEURO: A/O x 4, Strength and Sensation Grossly intact


PSYCH: Normal mood and affect


SKIN:  Warm/dry, No rashes





Laboratory Tests








Test


  7/25/19


05:44


 


White Blood Count


  6.6 K/UL


(4.8-10.8)


 


Red Blood Count


  4.41 M/UL


(4.70-6.10)  L


 


Hemoglobin


  13.2 G/DL


(14.2-18.0)  L


 


Hematocrit


  41.7 %


(42.0-52.0)  L


 


Mean Corpuscular Volume 95 FL (80-99)  


 


Mean Corpuscular Hemoglobin


  29.9 PG


(27.0-31.0)


 


Mean Corpuscular Hemoglobin


Concent 31.7 G/DL


(32.0-36.0)  L


 


Red Cell Distribution Width


  13.3 %


(11.6-14.8)


 


Platelet Count


  560 K/UL


(150-450)  H


 


Mean Platelet Volume


  4.7 FL


(6.5-10.1)  L


 


Neutrophils (%) (Auto)


  60.0 %


(45.0-75.0)


 


Lymphocytes (%) (Auto)


  32.8 %


(20.0-45.0)


 


Monocytes (%) (Auto)


  5.6 %


(1.0-10.0)


 


Eosinophils (%) (Auto)


  0.9 %


(0.0-3.0)


 


Basophils (%) (Auto)


  0.7 %


(0.0-2.0)


 


Sodium Level


  136 MMOL/L


(136-145)


 


Potassium Level


  4.7 MMOL/L


(3.5-5.1)


 


Chloride Level


  100 MMOL/L


()


 


Carbon Dioxide Level


  29 MMOL/L


(21-32)


 


Anion Gap


  8 mmol/L


(5-15)


 


Blood Urea Nitrogen


  20 mg/dL


(7-18)  H


 


Creatinine


  1.1 MG/DL


(0.55-1.30)


 


Estimat Glomerular Filtration


Rate > 60 mL/min


(>60)


 


Glucose Level


  98 MG/DL


()


 


Calcium Level


  9.8 MG/DL


(8.5-10.1)











Current Medications








 Medications


  (Trade)  Dose


 Ordered  Sig/Herlinda


 Route


 PRN Reason  Start Time


 Stop Time Status Last Admin


Dose Admin


 


 Acetaminophen


  (Tylenol)  650 mg  Q4H  PRN


 ORAL


 fever  7/14/19 08:15


 8/13/19 08:14  7/21/19 05:19


 


 


 Amoxicillin/


 Clavulanate


 Potassium


  (Augmentin)  875 mg  EVERY 12  HOURS


 ORAL


   7/21/19 21:00


 7/28/19 20:59  7/25/19 08:56


 


 


 Dextrose


  (Dextrose 50%)  25 ml  Q30M  PRN


 IV


 Hypoglycemia  7/14/19 08:30


 8/13/19 08:16   


 


 


 Dextrose


  (Dextrose 50%)  50 ml  Q30M  PRN


 IV


 hypoglycemia  7/14/19 08:30


 8/13/19 08:29   


 


 


 Docusate Sodium


  (Colace)  100 mg  THREE TIMES A  DAY


 ORAL


   7/14/19 18:00


 8/13/19 17:59  7/25/19 08:56


 


 


 Folic Acid


  (Folate)  3 mg  DAILY


 ORAL


   7/15/19 09:15


 8/14/19 09:14  7/25/19 10:33


 


 


 Heparin Sodium


  (Porcine)


  (Heparin 5000


 units/ml)  5,000 units  EVERY 12  HOURS


 SUBQ


   7/14/19 09:00


 8/13/19 08:59  7/25/19 08:57


 


 


 Hydromorphone HCl


  (Dilaudid)  0.5 mg  Q4H  PRN


 IVP


 For Pain  7/22/19 22:45


 7/29/19 22:44  7/25/19 09:24


 


 


 Ibuprofen


  (Motrin)  600 mg  TID


 ORAL


   7/17/19 18:00


 8/16/19 17:59  7/24/19 17:20


 


 


 Nitroglycerin


  (Ntg)  0.4 mg  Q5M  PRN


 SL


 Prn Chest Pain  7/14/19 08:15


 8/13/19 08:14   


 


 


 Ondansetron HCl


  (Zofran)  4 mg  Q6H  PRN


 IVP


 Nausea & Vomiting  7/14/19 08:15


 8/13/19 08:14   


 


 


 Pantoprazole


  (Protonix)  40 mg  EVERY 12  HOURS


 ORAL


   7/14/19 21:00


 8/13/19 20:59  7/25/19 08:56


 


 


 Polyethylene


 Glycol


  (Miralax)  17 gm  DAILYPRN  PRN


 ORAL


 Constipation  7/14/19 08:15


 8/13/19 08:14  7/14/19 11:32


 


 


 Potassium Chloride


  (K-Dur)  40 meq  DAILY


 ORAL


   7/18/19 09:00


 8/17/19 08:59  7/25/19 08:55


 


 


 Trimethoprim/


 Sulfamethoxazole


  (Bactrim-DS)  1 tab  Q12HR


 ORAL


   7/23/19 21:00


 7/28/19 20:59  7/25/19 08:55


 

















Sarah Sinclair M.D. Jul 25, 2019 10:39

## 2019-07-25 NOTE — NUR
Social Service Note



SW and Homeless Coordinator met with patient to address impending discharge and placement.  
Patient has been denied acceptance from multiple SNF.  SW discussed shelter placement or 
connecting with family.  Patient states he has "people" in [place].  Patient states he 
will contact his "people" to determine if they will be in agreement for him to go to their 
home.  Patient expressed concern that he isn't able to ambulate.  SW will obtain order for 
PT to evaluate patient and possible DME needs.  Patient is in agreement with shelter 
placement.  SW and homeless coordinator will continue to monitor and reassess as needed.

## 2019-07-25 NOTE — NUR
NURSE NOTES:

reassessed pain noco 10/325. patient still c/o pain 7/10. asked IV dilaudid. Dr. lozano 
said no more IV dilaudid.

## 2019-07-25 NOTE — NUR
NURSE NOTES:

patient c/o norco 10/325mg did not work for his pain. the patient asked back to IV dilaudid. 
Dr. lozano  said no more IV dilaudid.

## 2019-07-25 NOTE — NUR
NURSE NOTES:

 asked pt eval order for DC to snf. notified DR. zamora and received order of PT 
eval and TX. seen by SHRUTHI Shah  and will evaluate the patient tomorrow morning.

## 2019-07-25 NOTE — PULMONOLOGY PROGRESS NOTE
Assessment/Plan


Problems:  


(1) Sepsis


(2) Cellulitis of left foot


Assessment/Plan


improving


doing better


on abx


check cultures


wound care


dvt prophylaxis. 


dc planning





Subjective


ROS Limited/Unobtainable:  No


Constitutional:  Reports: no symptoms


HEENT:  Repors: no symptoms


Allergies:  


Coded Allergies:  


     No Known Allergies (Unverified , 7/13/19)





Objective





Last 24 Hour Vital Signs








  Date Time  Temp Pulse Resp B/P (MAP) Pulse Ox O2 Delivery O2 Flow Rate FiO2


 


7/25/19 09:00      Room Air  


 


7/25/19 08:00 98.5 82 19 117/69 (85) 97   


 


7/25/19 05:52 98.3       


 


7/25/19 04:00 98.3 88 18 102/75 (84) 96   


 


7/25/19 00:00 97.5 77 18 100/59 (73) 98   


 


7/24/19 21:00      Room Air  


 


7/24/19 20:00 97.7 83 18 99/66 (77) 97   


 


7/24/19 16:00 98.5 95 16 131/68 (89) 96   


 


7/24/19 12:00 98.2 78 17 106/71 (83) 97   

















Intake and Output  


 


 7/24/19 7/25/19





 19:00 07:00


 


Intake Total  600 ml


 


Output Total  1200 ml


 


Balance  -600 ml


 


  


 


Intake Oral  600 ml


 


Output Urine Total  1200 ml








Objective


General Appearance:  WD/WN


HEENT:  normocephalic, atraumatic


Respiratory/Chest:  chest wall non-tender, lungs clear, normal breath sounds


Breasts:  no masses


Cardiovascular:  normal peripheral pulses, normal rate


Abdomen:  normal bowel sounds, soft, non tender


Genitourinary:  normal external genitalia


Extremities:  no cyanosis, less edema


Laboratory Tests


7/25/19 05:44: 


White Blood Count 6.6, Red Blood Count 4.41L, Hemoglobin 13.2L, Hematocrit 41.7L

, Mean Corpuscular Volume 95, Mean Corpuscular Hemoglobin 29.9, Mean 

Corpuscular Hemoglobin Concent 31.7L, Red Cell Distribution Width 13.3, 

Platelet Count 560H, Mean Platelet Volume 4.7L, Neutrophils (%) (Auto) 60.0, 

Lymphocytes (%) (Auto) 32.8, Monocytes (%) (Auto) 5.6, Eosinophils (%) (Auto) 

0.9, Basophils (%) (Auto) 0.7, Sodium Level 136, Potassium Level 4.7, Chloride 

Level 100, Carbon Dioxide Level 29, Anion Gap 8, Blood Urea Nitrogen 20H, 

Creatinine 1.1, Estimat Glomerular Filtration Rate > 60, Glucose Level 98, 

Calcium Level 9.8





Current Medications








 Medications


  (Trade)  Dose


 Ordered  Sig/Herlinda


 Route


 PRN Reason  Start Time


 Stop Time Status Last Admin


Dose Admin


 


 Acetaminophen


  (Tylenol)  650 mg  Q4H  PRN


 ORAL


 fever  7/14/19 08:15


 8/13/19 08:14  7/21/19 05:19


 


 


 Amoxicillin/


 Clavulanate


 Potassium


  (Augmentin)  875 mg  EVERY 12  HOURS


 ORAL


   7/21/19 21:00


 7/28/19 20:59  7/25/19 08:56


 


 


 Dextrose


  (Dextrose 50%)  25 ml  Q30M  PRN


 IV


 Hypoglycemia  7/14/19 08:30


 8/13/19 08:16   


 


 


 Dextrose


  (Dextrose 50%)  50 ml  Q30M  PRN


 IV


 hypoglycemia  7/14/19 08:30


 8/13/19 08:29   


 


 


 Docusate Sodium


  (Colace)  100 mg  THREE TIMES A  DAY


 ORAL


   7/14/19 18:00


 8/13/19 17:59  7/25/19 08:56


 


 


 Folic Acid


  (Folate)  3 mg  DAILY


 ORAL


   7/15/19 09:15


 8/14/19 09:14  7/25/19 10:33


 


 


 Heparin Sodium


  (Porcine)


  (Heparin 5000


 units/ml)  5,000 units  EVERY 12  HOURS


 SUBQ


   7/14/19 09:00


 8/13/19 08:59  7/25/19 08:57


 


 


 Hydromorphone HCl


  (Dilaudid)  0.5 mg  Q4H  PRN


 IVP


 For Pain  7/22/19 22:45


 7/29/19 22:44  7/25/19 09:24


 


 


 Ibuprofen


  (Motrin)  600 mg  TID


 ORAL


   7/17/19 18:00


 8/16/19 17:59  7/24/19 17:20


 


 


 Nitroglycerin


  (Ntg)  0.4 mg  Q5M  PRN


 SL


 Prn Chest Pain  7/14/19 08:15


 8/13/19 08:14   


 


 


 Ondansetron HCl


  (Zofran)  4 mg  Q6H  PRN


 IVP


 Nausea & Vomiting  7/14/19 08:15


 8/13/19 08:14   


 


 


 Pantoprazole


  (Protonix)  40 mg  EVERY 12  HOURS


 ORAL


   7/14/19 21:00


 8/13/19 20:59  7/25/19 08:56


 


 


 Polyethylene


 Glycol


  (Miralax)  17 gm  DAILYPRN  PRN


 ORAL


 Constipation  7/14/19 08:15


 8/13/19 08:14  7/14/19 11:32


 


 


 Potassium Chloride


  (K-Dur)  40 meq  DAILY


 ORAL


   7/18/19 09:00


 8/17/19 08:59  7/25/19 08:55


 


 


 Trimethoprim/


 Sulfamethoxazole


  (Bactrim-DS)  1 tab  Q12HR


 ORAL


   7/23/19 21:00


 7/28/19 20:59  7/25/19 08:55


 

















Pia Coe MD Jul 25, 2019 11:38

## 2019-07-25 NOTE — NEPHROLOGY PROGRESS NOTE
Assessment/Plan


Problem List:  


(1) Cellulitis of left foot


(2) Sepsis


(3) Hyponatremia


(4) Hypoalbuminemia


Assessment


Left foot cellulitis / Leukocytosis  / No fever


Low Na


Low K


Anemia


High Lactic


Low Albumin


Plan











Isotonic solution


K supplement


DC IV fluids


monitor lytes


avoid nephrotoxics


urine tox screen positive for Amphetamines and Opiates


per orders


stable from renal stand if discharged





Subjective


ROS Limited/Unobtainable:  No





Objective


Objective





Last 24 Hour Vital Signs








  Date Time  Temp Pulse Resp B/P (MAP) Pulse Ox O2 Delivery O2 Flow Rate FiO2


 


7/25/19 08:00 98.5 82 19 117/69 (85) 97   


 


7/25/19 05:52 98.3       


 


7/25/19 04:00 98.3 88 18 102/75 (84) 96   


 


7/25/19 00:00 97.5 77 18 100/59 (73) 98   


 


7/24/19 21:00      Room Air  


 


7/24/19 20:00 97.7 83 18 99/66 (77) 97   


 


7/24/19 16:00 98.5 95 16 131/68 (89) 96   


 


7/24/19 12:00 98.2 78 17 106/71 (83) 97   

















Intake and Output  


 


 7/24/19 7/25/19





 19:00 07:00


 


Intake Total  600 ml


 


Output Total  1200 ml


 


Balance  -600 ml


 


  


 


Intake Oral  600 ml


 


Output Urine Total  1200 ml








Laboratory Tests


7/25/19 05:44: 


White Blood Count 6.6, Red Blood Count 4.41L, Hemoglobin 13.2L, Hematocrit 41.7L

, Mean Corpuscular Volume 95, Mean Corpuscular Hemoglobin 29.9, Mean 

Corpuscular Hemoglobin Concent 31.7L, Red Cell Distribution Width 13.3, 

Platelet Count 560H, Mean Platelet Volume 4.7L, Neutrophils (%) (Auto) 60.0, 

Lymphocytes (%) (Auto) 32.8, Monocytes (%) (Auto) 5.6, Eosinophils (%) (Auto) 

0.9, Basophils (%) (Auto) 0.7, Sodium Level 136, Potassium Level 4.7, Chloride 

Level 100, Carbon Dioxide Level 29, Anion Gap 8, Blood Urea Nitrogen 20H, 

Creatinine 1.1, Estimat Glomerular Filtration Rate > 60, Glucose Level 98, 

Calcium Level 9.8


Height (Feet):  6


Height (Inches):  1.00


Weight (Pounds):  155


General Appearance:  no apparent distress


Objective


no change











Manuel Norton MD Jul 25, 2019 09:27

## 2019-07-25 NOTE — GENERAL PROGRESS NOTE
Assessment/Plan


Status:  stable, progressing


Assessment/Plan:


Problem List:  


(1) Sepsis


ICD Codes:  A41.9 - Sepsis, unspecified organism


SNOMED:  41236828


(2) Cellulitis of left foot


ICD Codes:  L03.116 - Cellulitis of left lower limb


SNOMED:  460130183


Status:  stable, progressing


--> seen by id is on bactrim and augmentin





Subjective


Constitutional:  Denies: no symptoms, chills, diaphoresis, fever, malaise, 

weakness, other


HEENT:  Denies: no symptoms, eye pain, blurred vision, tearing, double vision, 

ear pain, ear discharge, nose pain, nose congestion, throat pain, throat 

swelling, mouth pain, mouth swelling, other


Cardiovascular:  Denies: no symptoms, chest pain, edema, irregular heart rate, 

lightheadedness, palpitations, syncope, other


Respiratory:  Denies: no symptoms, cough, orthopnea, shortness of breath, SOB 

with excertion, SOB at rest, sputum, stridor, wheezing, other


Gastrointestinal/Abdominal:  Denies: no symptoms, abdomen distended, abdominal 

pain, black stools, tarry stools, blood in stool, constipated, diarrhea, 

difficulty swallowing, nausea, poor appetite, poor fluid intake, rectal bleeding

, vomiting, other


Genitourinary:  Denies: no symptoms, burning, discharge, frequency, flank pain, 

hematuria, incontinence, pain, urgency, other


Neurologic/Psychiatric:  Denies: no symptoms, anxiety, depressed, emotional 

problems, headache, numbness, paresthesia, pre-existing deficit, seizure, 

tingling, tremors, weakness, other


Endocrine:  Denies: no symptoms, excessive sweating, flushing, intolerance to 

cold, intolerance to heat, increased hunger, increased thirst, increased urine, 

unexplained weight gain, unexplained weight loss, other


Hematologic/Lymphatic:  Denies: no symptoms, anemia, easy bleeding, easy 

bruising, other


Allergies:  


Coded Allergies:  


     No Known Allergies (Unverified , 7/13/19)


Subjective


7/25: left leg still draining clearish fluid, otherwise no f/c, no night sweats





Objective





Last 24 Hour Vital Signs








  Date Time  Temp Pulse Resp B/P (MAP) Pulse Ox O2 Delivery O2 Flow Rate FiO2


 


7/25/19 12:00 98.9 77 18 111/63 (79) 97   


 


7/25/19 09:00      Room Air  


 


7/25/19 08:00 98.5 82 19 117/69 (85) 97   


 


7/25/19 05:52 98.3       


 


7/25/19 04:00 98.3 88 18 102/75 (84) 96   


 


7/25/19 00:00 97.5 77 18 100/59 (73) 98   


 


7/24/19 21:00      Room Air  


 


7/24/19 20:00 97.7 83 18 99/66 (77) 97   


 


7/24/19 16:00 98.5 95 16 131/68 (89) 96   

















Intake and Output  


 


 7/24/19 7/25/19





 19:00 07:00


 


Intake Total  600 ml


 


Output Total  1200 ml


 


Balance  -600 ml


 


  


 


Intake Oral  600 ml


 


Output Urine Total  1200 ml








Laboratory Tests


7/25/19 05:44: 


White Blood Count 6.6, Red Blood Count 4.41L, Hemoglobin 13.2L, Hematocrit 41.7L

, Mean Corpuscular Volume 95, Mean Corpuscular Hemoglobin 29.9, Mean 

Corpuscular Hemoglobin Concent 31.7L, Red Cell Distribution Width 13.3, 

Platelet Count 560H, Mean Platelet Volume 4.7L, Neutrophils (%) (Auto) 60.0, 

Lymphocytes (%) (Auto) 32.8, Monocytes (%) (Auto) 5.6, Eosinophils (%) (Auto) 

0.9, Basophils (%) (Auto) 0.7, Sodium Level 136, Potassium Level 4.7, Chloride 

Level 100, Carbon Dioxide Level 29, Anion Gap 8, Blood Urea Nitrogen 20H, 

Creatinine 1.1, Estimat Glomerular Filtration Rate > 60, Glucose Level 98, 

Calcium Level 9.8


Height (Feet):  6


Height (Inches):  1.00


Weight (Pounds):  155


Objective


EENT:  normal ENT inspection


Neck:  normal alignment


Cardiovascular:  normal peripheral pulses, normal rate, regular rhythm


Respiratory/Chest:  chest wall non-tender, lungs clear, normal breath sounds


Abdomen:  normal bowel sounds, non tender, soft


Extremities:  normal inspection


Edema:  1+ Arm (L), 1+ Arm (R), 1+ Leg (L), 1+ Leg (R), 1+ Pedal (L), 1+ Pedal (

R), 1+ Generalized


Edema:  trace edema


Neurologic:  motor weakness


Skin:  normal pigmentation, warm/dry, ++l foor sl red and swollen up to ankle











Jeffrey Vasquez MD Jul 25, 2019 14:44

## 2019-07-25 NOTE — NUR
NURSE NOTES:  RECEIVED PATIENT LYING IN BED, AWAKE, ALERT/ORIENTED X4, ABLE TO VERBALIZE 
NEEDS, IV INTACT TO RIGHT FOREARM/GAUGE 22, WRAPPED WITH KERLEX TO PREVENT PATIENT FROM 
REMOVING DEVICE. NO SIGNS AND SYMPTOMS OF ACUTE CARDIO RESPIRATORY DISTRESS/SHORTNESS OF 
BREATH, DENIES CHEST PAIN, NO PERIPHERAL EDEMA NOTED. NO REPORT OF GI DISCOMFORT, ABDOMEN 
SOFT/NON DISTENDED/AUDIBLE BOWEL SOUNDS, NO N/V/D. SIDE RAILS UP X3/BED IN LOWEST POSITION 
FOR SAFETY. CALL LIGHT WITHIN REACH. FREQUENT ROUNDING FOR SAFETY/NEEDS. NAD.

## 2019-07-25 NOTE — NUR
SI:SEPSIS . CELLULITIS

VS: /59, P 77, T 97.5, RR 18, SpO2 98

RBC 4.41, H&H 13.2/41.7, BUN 20



IS:FOLATE 3mg

DILAUDID 0.5mg

HEPARIN SUBQ

AMOXICILLIN 875mg

K-DUR 40meq

BACTRIM-DS 1tab



**FINDING PLACEMENT FOR PATIENT



*****************MED/SURG STATUS**************

## 2019-07-26 VITALS — DIASTOLIC BLOOD PRESSURE: 58 MMHG | SYSTOLIC BLOOD PRESSURE: 112 MMHG

## 2019-07-26 VITALS — DIASTOLIC BLOOD PRESSURE: 60 MMHG | SYSTOLIC BLOOD PRESSURE: 115 MMHG

## 2019-07-26 VITALS — SYSTOLIC BLOOD PRESSURE: 104 MMHG | DIASTOLIC BLOOD PRESSURE: 61 MMHG

## 2019-07-26 VITALS — DIASTOLIC BLOOD PRESSURE: 73 MMHG | SYSTOLIC BLOOD PRESSURE: 134 MMHG

## 2019-07-26 VITALS — SYSTOLIC BLOOD PRESSURE: 97 MMHG | DIASTOLIC BLOOD PRESSURE: 62 MMHG

## 2019-07-26 VITALS — DIASTOLIC BLOOD PRESSURE: 62 MMHG | SYSTOLIC BLOOD PRESSURE: 111 MMHG

## 2019-07-26 RX ADMIN — DOCUSATE SODIUM SCH MG: 100 CAPSULE, LIQUID FILLED ORAL at 08:24

## 2019-07-26 RX ADMIN — AMOXICILLIN AND CLAVULANATE POTASSIUM SCH MG: 875; 125 TABLET, FILM COATED ORAL at 21:05

## 2019-07-26 RX ADMIN — AMOXICILLIN AND CLAVULANATE POTASSIUM SCH MG: 875; 125 TABLET, FILM COATED ORAL at 08:26

## 2019-07-26 RX ADMIN — HYDROCODONE BITARTRATE AND ACETAMINOPHEN PRN TAB: 10; 325 TABLET ORAL at 12:23

## 2019-07-26 RX ADMIN — DOCUSATE SODIUM SCH MG: 100 CAPSULE, LIQUID FILLED ORAL at 12:22

## 2019-07-26 RX ADMIN — SULFAMETHOXAZOLE AND TRIMETHOPRIM SCH TAB: 800; 160 TABLET ORAL at 08:25

## 2019-07-26 RX ADMIN — SULFAMETHOXAZOLE AND TRIMETHOPRIM SCH TAB: 800; 160 TABLET ORAL at 21:05

## 2019-07-26 RX ADMIN — DOCUSATE SODIUM SCH MG: 100 CAPSULE, LIQUID FILLED ORAL at 16:53

## 2019-07-26 RX ADMIN — HYDROGEN PEROXIDE SCH APPLIC: 2.65 LIQUID TOPICAL at 17:15

## 2019-07-26 RX ADMIN — HYDROCODONE BITARTRATE AND ACETAMINOPHEN PRN TAB: 10; 325 TABLET ORAL at 08:26

## 2019-07-26 RX ADMIN — HYDROCODONE BITARTRATE AND ACETAMINOPHEN PRN TAB: 10; 325 TABLET ORAL at 16:53

## 2019-07-26 RX ADMIN — HEPARIN SODIUM SCH UNITS: 5000 INJECTION INTRAVENOUS; SUBCUTANEOUS at 21:06

## 2019-07-26 RX ADMIN — HEPARIN SODIUM SCH UNITS: 5000 INJECTION INTRAVENOUS; SUBCUTANEOUS at 08:29

## 2019-07-26 RX ADMIN — HYDROCODONE BITARTRATE AND ACETAMINOPHEN PRN TAB: 10; 325 TABLET ORAL at 21:11

## 2019-07-26 RX ADMIN — HYDROCODONE BITARTRATE AND ACETAMINOPHEN PRN TAB: 10; 325 TABLET ORAL at 04:13

## 2019-07-26 NOTE — NUR
NURSE NOTES: RESTED WELL, NO SIGNIFICANT CHANGE OF CONDITION NOTED THROUGHOUT THE NIGHT,. 
SAFETY MAINTAINED. WOUND CARE TO LEFT LOWER EXTREMITY, TOLERATED WELL. NAD.

## 2019-07-26 NOTE — NUR
NURSE NOTES: RECEIVED PATIENT LYING IN BED, AWAKE, ALERT/ORIENTED X4, NO SIGNS AND SYMPTOMS 
OF ACUTE CARDIO RESPIRATORY DISTRESS/SHORTNESS OF BREATH, DENIES CHEST PAIN. NO COMPLAINTS 
OF GI DISCOMFORT, BOWEL SOUNDS AUDIBLE IN ALL QUADRANTS, NO REPORT OF N/V. DRESSING DRY AND 
INTACT TO LEFT LOWER EXTREMITY OPEN WOUNDS, CHANGED TODAY BY MD. SIDE RAILS UP X2 FOR 
MOBILITY, BED IN LOWEST POSITION FOR SAFETY. ENCOURAGED PATIENT TO UTILIZE CALL LIGHT FOR 
ASSISTANCE, VERBALIZED UNDERSTANDING. CONTINUE WITH CURRENT PLAN OF CARE. NAD.

## 2019-07-26 NOTE — NUR
NURSE NOTES:

Pt seen by Kimberly, Wound Care Nurse. Kimberly made recommendations for daily wound care for pt's 
cellulitis of left foot and leg.



Rn notified Dr. Weston at 1302 asking for wound care orders.

## 2019-07-26 NOTE — NUR
PT EVALUATION NOTE

Patient seen for initial evaluation, see complete evaluation for details. Patient presents 
with impaired balance, safety, transfers and ambulation due to painful L foot. Patient will 
benefit from skilled inpatient PT intervention to address strength, 

balance, safety and functional mobility. Recommend discharge to SNF for further rehab to 
improve level of functional mobility once medically cleared by MD. Patient will benefit from 
FWW for home use. 

-------------------------------------------------------------------------------

Addendum: 07/26/19 at 1259 by KATHE BABB PT

-------------------------------------------------------------------------------

Amended: Links added.

## 2019-07-26 NOTE — PODIATRIC PROGRESS NOTE
Assessment/Plan


Patient


Ray Gonzalez is a 59 year old male who was admitted on Jul 13, 2019 at 22:

58 with


Problems:  


(1) Foot ulcer due to secondary DM


(2) Diabetes mellitus type 2 with neurological manifestations


(3) Cellulitis, toe


Assessment/Plan


Continue oral abx


cleanse ulcers with NS/H2O2 qd flush and apply Xeroform with dry dressings. 


patient will be followed.





Subjective


Allergies:  


Coded Allergies:  


     No Known Allergies (Unverified , 7/13/19)


Subjective


Patient seen by bedside in NAD f/u L ankle cellulitis,.





Objective


Exam





Last 24 Hour Vital Signs








  Date Time  Temp Pulse Resp B/P (MAP) Pulse Ox O2 Delivery O2 Flow Rate FiO2


 


7/26/19 16:00 98.1 80 18 112/58 (76) 100   


 


7/26/19 13:02 98.1       


 


7/26/19 13:02 98.1       


 


7/26/19 12:00 98.1 87 18 134/73 (93) 100   


 


7/26/19 09:00      Room Air  


 


7/26/19 08:00 98.3 88 18 104/61 (75) 100   


 


7/26/19 04:00 97.9 84 18 115/60 (78) 98   


 


7/26/19 00:00 98.1 87 19 111/62 (78) 99   


 


7/25/19 21:00      Room Air  


 


7/25/19 20:00 98.9 89 19 108/58 (75) 99   











Microbiology








 Date/Time


Source Procedure


Growth Status


 


 


 7/13/19 20:27


Blood Blood Culture - Final


NO GROWTH AFTER 5 DAYS Complete


 


 7/13/19 20:25


Nasal Nares MRSA Culture - Final


NO METHICILLIN RESISTANT STAPH AUREUS... Complete





 7/14/19 11:30


Leg Left Gram Stain - Final Complete


 


 7/14/19 11:30 Wound Culture - Final


Klebsiella Oxytoca


Citrobacter Freundii


Proteus Mirabilis


Streptococcus Group A


Staphylococcus Aureus - Mrsa Complete











Dermatological


Dermatological Narrative


L foot noted healing with decrease edema and swelling.


3 ulcers were identified s/p blistering. 


1. ulcer medial proximal leg granulating base, no drainage or discharge no pus 

no cellulitis. probs to muscle.


2. lateral ankle small ulcer probing to subq no pus or discharge no pus. no 

odor. no cellulitis. 


3. dorsal L foot over the midfoot area. slight drainage noted. no pus no 

discharge drainage is bloody no odor. probs to tendon undermining distally 

about 1cm











Shimon Weston DPM Jul 26, 2019 17:18

## 2019-07-26 NOTE — NUR
DISCHARGE PLANNING



***Discharge order noted**



Patient has been referred to;



[facility] [phone]





***Await Acceptance and Room Number***

## 2019-07-26 NOTE — NEPHROLOGY PROGRESS NOTE
Assessment/Plan


Problem List:  


(1) Cellulitis of left foot


(2) Sepsis


(3) Hyponatremia


(4) Hypoalbuminemia


Assessment


Left foot cellulitis / Leukocytosis  / No fever


Low Na


Low K


Anemia


High Lactic


Low Albumin


Plan











Isotonic solution


K supplement


DC IV fluids


monitor lytes


avoid nephrotoxics


urine tox screen positive for Amphetamines and Opiates


per orders


stable from renal stand if discharged





Subjective


ROS Limited/Unobtainable:  No


Constitutional:  Reports: malaise





Objective


Objective





Last 24 Hour Vital Signs








  Date Time  Temp Pulse Resp B/P (MAP) Pulse Ox O2 Delivery O2 Flow Rate FiO2


 


7/26/19 09:00      Room Air  


 


7/26/19 08:57 98.1       


 


7/26/19 08:56 98.1       


 


7/26/19 08:00 98.3 88 18 104/61 (75) 100   


 


7/26/19 04:00 97.9 84 18 115/60 (78) 98   


 


7/26/19 00:00 98.1 87 19 111/62 (78) 99   


 


7/25/19 21:00      Room Air  


 


7/25/19 20:00 98.9 89 19 108/58 (75) 99   


 


7/25/19 16:00 98.7 84 19 132/74 (93) 97   

















Intake and Output  


 


 7/25/19 7/26/19





 19:00 07:00


 


Intake Total 920 ml 960 ml


 


Output Total 1300 ml 1600 ml


 


Balance -380 ml -640 ml


 


  


 


Intake Oral 920 ml 960 ml


 


Output Urine Total 1300 ml 1600 ml








Height (Feet):  6


Height (Inches):  1.00


Weight (Pounds):  155


General Appearance:  no apparent distress


Objective


no change











Manuel Norton MD Jul 26, 2019 12:17

## 2019-07-26 NOTE — NUR
DISCHARGE PLANNING



***Discharge order noted**



Patient has been referred:



[facility] [phone]

[facility] [phone]





***Await Acceptance and Room Number***

-------------------------------------------------------------------------------

Addendum: 07/26/19 at 1715 by LILIAN DAVID CM

-------------------------------------------------------------------------------

Caren ~ spoke with MICHELLE system down, will follow-up Monday

Radha ~ spoke with Marissa Raymond male beds

## 2019-07-26 NOTE — NUR
NURSE NOTES:WOUND CARE NOTES: Pt seen for multiple ulcerations LLE. Ulcer noted to 
anibal/medial L tibia. Base of wound is gatito with small amt serous exudate. Borders are 
irregular and macerated. Ulcer noted to dorsal aspect of L foot. scattered dry scabs noted 
with open area which is gatito at base .Small amt serous exudate noted. Small ulcer noted to 
Lateral L foot inferior to L malleolus. Wound oozing serous exudate.Resolving ulcer noted to 
medial L tibia inferior to Patella.  Pt complained of pain dorsal L foot and lateral 
malleolus . Primary nurse aware and pt was medicated for pain. Dry peeling skin noted to LLE 
and L foot. Pt LLE and L foot washed . DRy skin areas moisturized with Lotion. Wounds LLE 
and L foot cleansed with Saline. Xeroform gauze placed over each wound,then Calcium 
Alginate,Covered with ABD pads and wrapped with Kerlix from base of toes.



Recommendations: Please Follow -Up with Podiatry regarding Tx orders for Wounds. 

                          Elevate lower extremities with pillows.

                          Moisturize dry skin daily.

## 2019-07-26 NOTE — NUR
NURSE NOTES:

Received report from VIRGINIA Chavarria. Pt in bed asleep, respirations unlabored, no apparent 
distress noted, bed in lowest position, call light within reach.

## 2019-07-26 NOTE — NUR
SI;     LEFT FOOT CELLULITIS

T. 98.1 HR 88 RR 18 B/P 106/61

RA 98%

BUN 20







IS:     BACTRIM DS PO

AUGMENTIN PO

PROTONIX PO

HEPARIN SUBC

PLACEMENT PENDING

********MED/SURG STATUS******

## 2019-07-26 NOTE — NUR
DISCHARGE PLANNING



***Discharge order noted**



[facility]



[facility] [phone]

[phone]





***Await Acceptance and Room Number***

## 2019-07-26 NOTE — PULMONOLOGY PROGRESS NOTE
Assessment/Plan


Problems:  


(1) Sepsis


(2) Cellulitis of left foot


Assessment/Plan


improving


doing better


on abx


check cultures


wound care


dvt prophylaxis. 


dc planning





Subjective


ROS Limited/Unobtainable:  No


Constitutional:  Reports: no symptoms


HEENT:  Repors: no symptoms


Allergies:  


Coded Allergies:  


     No Known Allergies (Unverified , 7/13/19)





Objective





Last 24 Hour Vital Signs








  Date Time  Temp Pulse Resp B/P (MAP) Pulse Ox O2 Delivery O2 Flow Rate FiO2


 


7/26/19 13:02 98.1       


 


7/26/19 13:02 98.1       


 


7/26/19 12:00 98.1 87 18 134/73 (93) 100   


 


7/26/19 09:00      Room Air  


 


7/26/19 08:00 98.3 88 18 104/61 (75) 100   


 


7/26/19 04:00 97.9 84 18 115/60 (78) 98   


 


7/26/19 00:00 98.1 87 19 111/62 (78) 99   


 


7/25/19 21:00      Room Air  


 


7/25/19 20:00 98.9 89 19 108/58 (75) 99   


 


7/25/19 16:00 98.7 84 19 132/74 (93) 97   

















Intake and Output  


 


 7/25/19 7/26/19





 19:00 07:00


 


Intake Total 920 ml 960 ml


 


Output Total 1300 ml 1600 ml


 


Balance -380 ml -640 ml


 


  


 


Intake Oral 920 ml 960 ml


 


Output Urine Total 1300 ml 1600 ml








Objective


General Appearance:  WD/WN


HEENT:  normocephalic, atraumatic


Respiratory/Chest:  chest wall non-tender, lungs clear, normal breath sounds


Breasts:  no masses


Cardiovascular:  normal peripheral pulses, normal rate


Abdomen:  normal bowel sounds, soft, non tender


Genitourinary:  normal external genitalia


Extremities:  no cyanosis, less edema





Current Medications








 Medications


  (Trade)  Dose


 Ordered  Sig/Herlinda


 Route


 PRN Reason  Start Time


 Stop Time Status Last Admin


Dose Admin


 


 Acetaminophen


  (Tylenol)  650 mg  Q4H  PRN


 ORAL


 fever  7/14/19 08:15


 8/13/19 08:14  7/21/19 05:19


 


 


 Acetaminophen/


 Hydrocodone Bitart


  (Norco 10/325)  1 tab  Q4H  PRN


 ORAL


 For breakthrough pain  7/25/19 11:45


 8/1/19 11:44  7/26/19 12:23


 


 


 Amoxicillin/


 Clavulanate


 Potassium


  (Augmentin)  875 mg  EVERY 12  HOURS


 ORAL


   7/21/19 21:00


 7/28/19 20:59  7/26/19 08:26


 


 


 Dextrose


  (Dextrose 50%)  25 ml  Q30M  PRN


 IV


 Hypoglycemia  7/14/19 08:30


 8/13/19 08:16   


 


 


 Dextrose


  (Dextrose 50%)  50 ml  Q30M  PRN


 IV


 hypoglycemia  7/14/19 08:30


 8/13/19 08:29   


 


 


 Docusate Sodium


  (Colace)  100 mg  THREE TIMES A  DAY


 ORAL


   7/14/19 18:00


 8/13/19 17:59  7/26/19 12:22


 


 


 Folic Acid


  (Folate)  3 mg  DAILY


 ORAL


   7/26/19 09:00


 8/25/19 08:59  7/26/19 08:41


 


 


 Heparin Sodium


  (Porcine)


  (Heparin 5000


 units/ml)  5,000 units  EVERY 12  HOURS


 SUBQ


   7/14/19 09:00


 8/13/19 08:59  7/26/19 08:29


 


 


 Ibuprofen


  (Motrin)  600 mg  TID


 ORAL


   7/17/19 18:00


 8/16/19 17:59  7/26/19 12:22


 


 


 Nitroglycerin


  (Ntg)  0.4 mg  Q5M  PRN


 SL


 Prn Chest Pain  7/14/19 08:15


 8/13/19 08:14   


 


 


 Ondansetron HCl


  (Zofran)  4 mg  Q6H  PRN


 IVP


 Nausea & Vomiting  7/14/19 08:15


 8/13/19 08:14   


 


 


 Pantoprazole


  (Protonix)  40 mg  EVERY 12  HOURS


 ORAL


   7/14/19 21:00


 8/13/19 20:59  7/26/19 08:25


 


 


 Polyethylene


 Glycol


  (Miralax)  17 gm  DAILYPRN  PRN


 ORAL


 Constipation  7/14/19 08:15


 8/13/19 08:14  7/14/19 11:32


 


 


 Potassium Chloride


  (K-Dur)  40 meq  DAILY


 ORAL


   7/18/19 09:00


 8/17/19 08:59  7/26/19 08:24


 


 


 Trimethoprim/


 Sulfamethoxazole


  (Bactrim-DS)  1 tab  Q12HR


 ORAL


   7/23/19 21:00


 7/28/19 20:59  7/26/19 08:25


 

















Pia Coe MD Jul 26, 2019 14:07

## 2019-07-26 NOTE — INFECTIOUS DISEASES PROG NOTE
Assessment/Plan


Assessment/Plan





Assessment/Plan:


58 yo male who presnted to the ED on 7/12/19 with left leg cellulitis.





Left foot cellulitis; improving


    -wound cx: MRSA, GAS, P.  mirabilsi (Gonzalez S), C. freundi (R ancef, otherwise 

S), K. oxytoca (R amp, otherwise S)


    Leukocytosis ;SP


    Low grade fevers; SP


       -Tibia/fibula MRI: Negative for evidence of osteomyelitis. Evidence of 

superficial soft tissue ulcer, marked by a marker at the anteromedial shin 

region.Considerable subcutaneous fat edema. Given stated clinical history, 

probably on the basis of cellulitis. Correlate with clinical findings. No 

evidence of drainable abscess.


      -foot MRI:  Extensive soft tissue edema, as described. This is in keeping 

with stated clinical history of cellulitis. No findings to suggest abscess Very 

superficial fluid collections likely relate to stated clinical history of 

dorsal blisters. No marrow abnormality to suggest acute osteomyelitis 

demonstrated.


      -ANkle MRI:  Patchy areas of marrow edema, as described. Periarticular 

distribution of this is suggestive of arthropathy which may be degenerative, 

inflammatory, or, most likely, on the basis of Charcot-type changes. Per 

technologist, there are no ulcers in the area so osteomyelitis is deemed much 

less likely. Extensive edema of the subcutaneous fat. Given stated clinical 

history of cellulitis most likely on the basis of such. Correlate with clinical 

findings. No drainable


abscess collection demonstrated.


 


        -Foot xray: No acute injury identified. Soft tissue swelling. Multiple 

incidental findings as described











PLAN:





 - Continue PO Bactrim DS 1 tab bid and Augmentin 875/125mg PO bid #6 (abx d # 

13/14) 


       --ok to discharge on above regimen





     -7/21 SP Cefepime #8, IV Vancomycin #8





 - f/u wound cx


 - Monitor CBC and Temps


 -Podiatry f/u





Thank you for this consult. We will continue to follow the patient during this 

hospitalization.





Subjective


Allergies:  


Coded Allergies:  


     No Known Allergies (Unverified , 7/13/19)


Subjective


afebrile


no leukocytosis 


awaiting placement





Objective


Vital Signs





Last 24 Hour Vital Signs








  Date Time  Temp Pulse Resp B/P (MAP) Pulse Ox O2 Delivery O2 Flow Rate FiO2


 


7/26/19 09:00      Room Air  


 


7/26/19 08:57 98.1       


 


7/26/19 08:56 98.1       


 


7/26/19 08:00 98.3 88 18 104/61 (75) 100   


 


7/26/19 04:00 97.9 84 18 115/60 (78) 98   


 


7/26/19 00:00 98.1 87 19 111/62 (78) 99   


 


7/25/19 21:00      Room Air  


 


7/25/19 20:00 98.9 89 19 108/58 (75) 99   


 


7/25/19 16:00 98.7 84 19 132/74 (93) 97   


 


7/25/19 12:00 98.9 77 18 111/63 (79) 97   








Height (Feet):  6


Height (Inches):  1.00


Weight (Pounds):  155


Objective


Gen:  NAD


HEENT: NCAT, MMM, EOMI, PERRL, No Oral lesion, no scleral icterus 


NECK:  full range of motion, supple, no meningismus, No LAD, No JVD


LUNGS:  CTAB, No W/C, No Accessory muscle use


CARDS: RRR, S1, S2, No M/R/G, 


ABD: Soft, NT, ND, No R/G, + BS, No HSM, No Masses


: Deferred


Ext: C/C/E, Pulses 2+ B/L (DP, Rad): LLE swelling with erythema and pain, 

Ulceration present with purulence.


NEURO: A/O x 4, Strength and Sensation Grossly intact


PSYCH: Normal mood and affect


SKIN:  Warm/dry, No rashes





Current Medications








 Medications


  (Trade)  Dose


 Ordered  Sig/Herlinda


 Route


 PRN Reason  Start Time


 Stop Time Status Last Admin


Dose Admin


 


 Acetaminophen


  (Tylenol)  650 mg  Q4H  PRN


 ORAL


 fever  7/14/19 08:15


 8/13/19 08:14  7/21/19 05:19


 


 


 Acetaminophen/


 Hydrocodone Bitart


  (Norco 10/325)  1 tab  Q4H  PRN


 ORAL


 For breakthrough pain  7/25/19 11:45


 8/1/19 11:44  7/26/19 08:26


 


 


 Amoxicillin/


 Clavulanate


 Potassium


  (Augmentin)  875 mg  EVERY 12  HOURS


 ORAL


   7/21/19 21:00


 7/28/19 20:59  7/26/19 08:26


 


 


 Dextrose


  (Dextrose 50%)  25 ml  Q30M  PRN


 IV


 Hypoglycemia  7/14/19 08:30


 8/13/19 08:16   


 


 


 Dextrose


  (Dextrose 50%)  50 ml  Q30M  PRN


 IV


 hypoglycemia  7/14/19 08:30


 8/13/19 08:29   


 


 


 Docusate Sodium


  (Colace)  100 mg  THREE TIMES A  DAY


 ORAL


   7/14/19 18:00


 8/13/19 17:59  7/26/19 08:24


 


 


 Folic Acid


  (Folate)  3 mg  DAILY


 ORAL


   7/26/19 09:00


 8/25/19 08:59  7/26/19 08:41


 


 


 Heparin Sodium


  (Porcine)


  (Heparin 5000


 units/ml)  5,000 units  EVERY 12  HOURS


 SUBQ


   7/14/19 09:00


 8/13/19 08:59  7/26/19 08:29


 


 


 Ibuprofen


  (Motrin)  600 mg  TID


 ORAL


   7/17/19 18:00


 8/16/19 17:59  7/26/19 08:27


 


 


 Nitroglycerin


  (Ntg)  0.4 mg  Q5M  PRN


 SL


 Prn Chest Pain  7/14/19 08:15


 8/13/19 08:14   


 


 


 Ondansetron HCl


  (Zofran)  4 mg  Q6H  PRN


 IVP


 Nausea & Vomiting  7/14/19 08:15


 8/13/19 08:14   


 


 


 Pantoprazole


  (Protonix)  40 mg  EVERY 12  HOURS


 ORAL


   7/14/19 21:00


 8/13/19 20:59  7/26/19 08:25


 


 


 Polyethylene


 Glycol


  (Miralax)  17 gm  DAILYPRN  PRN


 ORAL


 Constipation  7/14/19 08:15


 8/13/19 08:14  7/14/19 11:32


 


 


 Potassium Chloride


  (K-Dur)  40 meq  DAILY


 ORAL


   7/18/19 09:00


 8/17/19 08:59  7/26/19 08:24


 


 


 Trimethoprim/


 Sulfamethoxazole


  (Bactrim-DS)  1 tab  Q12HR


 ORAL


   7/23/19 21:00


 7/28/19 20:59  7/26/19 08:25


 

















Sarah Sinclair M.D. Jul 26, 2019 11:19

## 2019-07-26 NOTE — GENERAL PROGRESS NOTE
Assessment/Plan


Status:  stable, progressing


Assessment/Plan:


Ass/Recs:


# Sepsis


ICD Codes:  A41.9 - Sepsis, unspecified organism


# Cellulitis of left foot


ICD Codes:  L03.116 - Cellulitis of left lower limb


Status:  stable, progressing


--> seen by id is on bactrim and augmentin


# Hypokalemia 


--> KCl repleted





Appreciate consultant recs





Subjective


HEENT:  Denies: no symptoms, eye pain, blurred vision, tearing, double vision, 

ear pain, ear discharge, nose pain, nose congestion, throat pain, throat 

swelling, mouth pain, mouth swelling, other


Cardiovascular:  Denies: no symptoms, chest pain, edema, irregular heart rate, 

lightheadedness, palpitations, syncope, other


Respiratory:  Denies: no symptoms, cough, orthopnea, shortness of breath, SOB 

with excertion, SOB at rest, sputum, stridor, wheezing, other


Gastrointestinal/Abdominal:  Denies: no symptoms, abdomen distended, abdominal 

pain, black stools, tarry stools, blood in stool, constipated, diarrhea, 

difficulty swallowing, nausea, poor appetite, poor fluid intake, rectal bleeding

, vomiting, other


Genitourinary:  Denies: no symptoms, burning, discharge, frequency, flank pain, 

hematuria, incontinence, pain, urgency, other


Neurologic/Psychiatric:  Denies: no symptoms, anxiety, depressed, emotional 

problems, headache, numbness, paresthesia, pre-existing deficit, seizure, 

tingling, tremors, weakness, other


Endocrine:  Denies: no symptoms, excessive sweating, flushing, intolerance to 

cold, intolerance to heat, increased hunger, increased thirst, increased urine, 

unexplained weight gain, unexplained weight loss, other


Allergies:  


Coded Allergies:  


     No Known Allergies (Unverified , 7/13/19)


Subjective


7/25: left leg still draining clearish fluid, otherwise no f/c, no night sweats


7/26: no issues reported, no fevers or chills, labs reviewed, lying in bed, a+o 

x4





Objective





Last 24 Hour Vital Signs








  Date Time  Temp Pulse Resp B/P (MAP) Pulse Ox O2 Delivery O2 Flow Rate FiO2


 


7/26/19 08:00 98.3 88 18 104/61 (75) 100   


 


7/26/19 04:43 98.1       


 


7/26/19 04:00 97.9 84 18 115/60 (78) 98   


 


7/26/19 00:00 98.1 87 19 111/62 (78) 99   


 


7/25/19 21:00      Room Air  


 


7/25/19 20:00 98.9 89 19 108/58 (75) 99   


 


7/25/19 16:00 98.7 84 19 132/74 (93) 97   


 


7/25/19 12:00 98.9 77 18 111/63 (79) 97   

















Intake and Output  


 


 7/25/19 7/26/19





 18:59 06:59


 


Intake Total 920 ml 960 ml


 


Output Total 1300 ml 1600 ml


 


Balance -380 ml -640 ml


 


  


 


Intake Oral 920 ml 960 ml


 


Output Urine Total 1300 ml 1600 ml








Height (Feet):  6


Height (Inches):  1.00


Weight (Pounds):  155


Objective


EENT:  normal ENT inspection


Neck:  normal alignment


Cardiovascular:  normal peripheral pulses, normal rate, regular rhythm


Respiratory/Chest:  chest wall non-tender, lungs clear, normal breath sounds


Abdomen:  normal bowel sounds, non tender, soft


Extremities:  normal inspection


Edema:  1+ Arm (L), 1+ Arm (R), 1+ Leg (L), 1+ Leg (R), 1+ Pedal (L), 1+ Pedal (

R), 1+ Generalized


Edema:  trace edema


Neurologic:  motor weakness


Skin:  normal pigmentation, warm/dry, ++l foor sl red and swollen up to ankle











Jeffrey Vasquez MD Jul 26, 2019 09:06

## 2019-07-27 VITALS — DIASTOLIC BLOOD PRESSURE: 70 MMHG | SYSTOLIC BLOOD PRESSURE: 106 MMHG

## 2019-07-27 VITALS — SYSTOLIC BLOOD PRESSURE: 115 MMHG | DIASTOLIC BLOOD PRESSURE: 66 MMHG

## 2019-07-27 VITALS — SYSTOLIC BLOOD PRESSURE: 108 MMHG | DIASTOLIC BLOOD PRESSURE: 61 MMHG

## 2019-07-27 VITALS — DIASTOLIC BLOOD PRESSURE: 54 MMHG | SYSTOLIC BLOOD PRESSURE: 100 MMHG

## 2019-07-27 VITALS — DIASTOLIC BLOOD PRESSURE: 70 MMHG | SYSTOLIC BLOOD PRESSURE: 112 MMHG

## 2019-07-27 VITALS — SYSTOLIC BLOOD PRESSURE: 100 MMHG | DIASTOLIC BLOOD PRESSURE: 63 MMHG

## 2019-07-27 RX ADMIN — HYDROCODONE BITARTRATE AND ACETAMINOPHEN PRN TAB: 10; 325 TABLET ORAL at 18:49

## 2019-07-27 RX ADMIN — HYDROGEN PEROXIDE SCH APPLIC: 2.65 LIQUID TOPICAL at 08:04

## 2019-07-27 RX ADMIN — HYDROCODONE BITARTRATE AND ACETAMINOPHEN PRN TAB: 10; 325 TABLET ORAL at 15:08

## 2019-07-27 RX ADMIN — HEPARIN SODIUM SCH UNITS: 5000 INJECTION INTRAVENOUS; SUBCUTANEOUS at 21:10

## 2019-07-27 RX ADMIN — HYDROCODONE BITARTRATE AND ACETAMINOPHEN PRN TAB: 10; 325 TABLET ORAL at 01:57

## 2019-07-27 RX ADMIN — AMOXICILLIN AND CLAVULANATE POTASSIUM SCH MG: 875; 125 TABLET, FILM COATED ORAL at 07:59

## 2019-07-27 RX ADMIN — HYDROCODONE BITARTRATE AND ACETAMINOPHEN PRN TAB: 10; 325 TABLET ORAL at 06:38

## 2019-07-27 RX ADMIN — DOCUSATE SODIUM SCH MG: 100 CAPSULE, LIQUID FILLED ORAL at 17:12

## 2019-07-27 RX ADMIN — DOCUSATE SODIUM SCH MG: 100 CAPSULE, LIQUID FILLED ORAL at 13:04

## 2019-07-27 RX ADMIN — SULFAMETHOXAZOLE AND TRIMETHOPRIM SCH TAB: 800; 160 TABLET ORAL at 08:01

## 2019-07-27 RX ADMIN — HYDROCODONE BITARTRATE AND ACETAMINOPHEN PRN TAB: 10; 325 TABLET ORAL at 10:44

## 2019-07-27 RX ADMIN — DOCUSATE SODIUM SCH MG: 100 CAPSULE, LIQUID FILLED ORAL at 08:02

## 2019-07-27 RX ADMIN — HEPARIN SODIUM SCH UNITS: 5000 INJECTION INTRAVENOUS; SUBCUTANEOUS at 08:04

## 2019-07-27 NOTE — NEPHROLOGY PROGRESS NOTE
Assessment/Plan


Problem List:  


(1) Cellulitis of left foot


(2) Sepsis


(3) Hyponatremia


(4) Hypoalbuminemia


Assessment


Left foot cellulitis / Leukocytosis  / No fever


Low Na


Low K


Anemia


High Lactic


Low Albumin


Plan











Isotonic solution


K supplement


DC IV fluids


monitor lytes


avoid nephrotoxics


urine tox screen positive for Amphetamines and Opiates


per orders


stable from renal stand if discharged





Subjective


ROS Limited/Unobtainable:  No





Objective


Objective





Last 24 Hour Vital Signs








  Date Time  Temp Pulse Resp B/P (MAP) Pulse Ox O2 Delivery O2 Flow Rate FiO2


 


7/27/19 09:00      Room Air  


 


7/27/19 08:31 96.8       


 


7/27/19 08:00 96.3 78 18 100/63 (75) 98   


 


7/27/19 04:00 96.8 81 19 100/54 (69) 99   


 


7/27/19 00:00 97.8 80 19 112/70 (84) 99   


 


7/26/19 21:41 98.1       


 


7/26/19 21:00      Room Air  


 


7/26/19 20:00 98.1 86 19 97/62 (74) 98   


 


7/26/19 16:00 98.1 80 18 112/58 (76) 100   


 


7/26/19 12:00 98.1 87 18 134/73 (93) 100   

















Intake and Output  


 


 7/26/19 7/27/19





 18:59 06:59


 


Intake Total 840 ml 1480 ml


 


Output Total 1200 ml 1000 ml


 


Balance -360 ml 480 ml


 


  


 


Intake Oral 840 ml 480 ml


 


Other  1000 ml


 


Output Urine Total 1200 ml 1000 ml


 


# Voids  2


 


# Bowel Movements  2








Height (Feet):  6


Height (Inches):  1.00


Weight (Pounds):  155


General Appearance:  no apparent distress


Objective


no change











Manuel Norton MD Jul 27, 2019 11:16

## 2019-07-27 NOTE — NUR
NURSE NOTES:

Received report from IMER Núñez. Pt in bed, awake, talkative, no apparent respiratory 
distress noted, pt eating breakfast, pt reassessed for pain by IMER Núñez. Pt states pain is 
6/10 after pain medication admin. Pt has schedule medications that can be given at 0800, Rn 
discussed plan of care with pt, bed in lowest position call light within reach, bed in 
lowest position, left foot and leg dressing noted intact.

## 2019-07-27 NOTE — NUR
NURSE NOTES:

Provided wet cloth to wash face and upper extremities. Patient able to perform independently 
with some assistance of providing a wet cloth. Encourage patient to take a rest. Will 
continue to monitor and provide care as ordered.

## 2019-07-27 NOTE — NUR
CASE MANAGEMENT: REVIEW 



7/27/2019



SI:SEPSIS. 

T 97.8 HR 73 RR 18 B/P 106/70 SATS 97% ON RA 

NO LABS TODAY 



IS: PROTONIX PO Q12H 

K DUR PO QD

FOLATE QD

MOTRIN PO TID 



**MED/SURG STATUS***

## 2019-07-27 NOTE — NUR
NURSE NOTES:

Received patient in bed. AAO x 4. On room air. Dressing on L foot intact and dry. IV on L FA 
intact and asymptomatic. No acute distress noted at this time. Bed locked, lowest position, 
alarm on, side rails up x 2, call light within reach. Will continue to monitor.

## 2019-07-27 NOTE — INFECTIOUS DISEASES PROG NOTE
Assessment/Plan


Assessment/Plan


A:








60 yo male who presnted to the ED on 7/12/19 with left leg cellulitis.





Left foot cellulitis; improving


    -wound cx: MRSA, GAS, P.  mirabilsi (Gonzalez S), C. freundi (R ancef, otherwise 

S), K. oxytoca (R amp, otherwise S)


    Leukocytosis ;SP


    Low grade fevers; SP


       -Tibia/fibula MRI: Negative for evidence of osteomyelitis. Evidence of 

superficial soft tissue ulcer, marked by a marker at the anteromedial shin 

region.Considerable subcutaneous fat edema. Given stated clinical history, 

probably on the basis of cellulitis. Correlate with clinical findings. No 

evidence of drainable abscess.


      -foot MRI:  Extensive soft tissue edema, as described. This is in keeping 

with stated clinical history of cellulitis. No findings to suggest abscess Very 

superficial fluid collections likely relate to stated clinical history of 

dorsal blisters. No marrow abnormality to suggest acute osteomyelitis 

demonstrated.


      -ANkle MRI:  Patchy areas of marrow edema, as described. Periarticular 

distribution of this is suggestive of arthropathy which may be degenerative, 

inflammatory, or, most likely, on the basis of Charcot-type changes. Per 

technologist, there are no ulcers in the area so osteomyelitis is deemed much 

less likely. Extensive edema of the subcutaneous fat. Given stated clinical 

history of cellulitis most likely on the basis of such. Correlate with clinical 

findings. No drainable


abscess collection demonstrated.


 


        -Foot xray: No acute injury identified. Soft tissue swelling. Multiple 

incidental findings as described











PLAN:





 - DC PO Bactrim DS 1 tab bid and Augmentin 875/125mg PO bid #6 (abx d # 14/14) 





     -7/21 SP Cefepime #8, IV Vancomycin #8





 - Monitor CBC and Temps


 -Podiatry f/u





Subjective


Constitutional:  Denies: no symptoms, fever, chills, fatigue, anorexia, 

drenching sweats, other


Allergies:  


Coded Allergies:  


     No Known Allergies (Unverified , 7/13/19)





Objective


Vital Signs





Last 24 Hour Vital Signs








  Date Time  Temp Pulse Resp B/P (MAP) Pulse Ox O2 Delivery O2 Flow Rate FiO2


 


7/27/19 09:00      Room Air  


 


7/27/19 08:31 96.8       


 


7/27/19 08:00 96.3 78 18 100/63 (75) 98   


 


7/27/19 04:00 96.8 81 19 100/54 (69) 99   


 


7/27/19 00:00 97.8 80 19 112/70 (84) 99   


 


7/26/19 21:41 98.1       


 


7/26/19 21:00      Room Air  


 


7/26/19 20:00 98.1 86 19 97/62 (74) 98   


 


7/26/19 16:00 98.1 80 18 112/58 (76) 100   


 


7/26/19 12:00 98.1 87 18 134/73 (93) 100   








Height (Feet):  6


Height (Inches):  1.00


Weight (Pounds):  155


HEENT:  anicteric


Respiratory/Chest:  normal breath sounds


Cardiovascular:  regular rhythm


Abdomen:  no organomegaly





Current Medications








 Medications


  (Trade)  Dose


 Ordered  Sig/Herlinda


 Route


 PRN Reason  Start Time


 Stop Time Status Last Admin


Dose Admin


 


 Acetaminophen


  (Tylenol)  650 mg  Q4H  PRN


 ORAL


 fever  7/14/19 08:15


 8/13/19 08:14  7/21/19 05:19


 


 


 Acetaminophen/


 Hydrocodone Bitart


  (Norco 10/325)  1 tab  Q4H  PRN


 ORAL


 For breakthrough pain  7/25/19 11:45


 8/1/19 11:44  7/27/19 10:44


 


 


 Amoxicillin/


 Clavulanate


 Potassium


  (Augmentin)  875 mg  EVERY 12  HOURS


 ORAL


   7/21/19 21:00


 7/28/19 20:59  7/27/19 07:59


 


 


 Artificial Tears


  (Lacri-Lube)  1 applic  QIDPRN  PRN


 BOTH EYES


 Dry Eyes  7/26/19 18:30


 8/25/19 18:14   


 


 


 Dextrose


  (Dextrose 50%)  25 ml  Q30M  PRN


 IV


 Hypoglycemia  7/14/19 08:30


 8/13/19 08:16   


 


 


 Dextrose


  (Dextrose 50%)  50 ml  Q30M  PRN


 IV


 hypoglycemia  7/14/19 08:30


 8/13/19 08:29   


 


 


 Docusate Sodium


  (Colace)  100 mg  THREE TIMES A  DAY


 ORAL


   7/14/19 18:00


 8/13/19 17:59  7/27/19 08:02


 


 


 Folic Acid


  (Folate)  3 mg  DAILY


 ORAL


   7/26/19 09:00


 8/25/19 08:59  7/27/19 08:02


 


 


 Heparin Sodium


  (Porcine)


  (Heparin 5000


 units/ml)  5,000 units  EVERY 12  HOURS


 SUBQ


   7/14/19 09:00


 8/13/19 08:59  7/27/19 08:04


 


 


 Hydrogen Peroxide


  (Hydrogen


 Peroxide)  1 applic  DAILY


 TOPIC


   7/26/19 17:15


 8/25/19 17:14  7/27/19 08:04


 


 


 Ibuprofen


  (Motrin)  600 mg  TID


 ORAL


   7/17/19 18:00


 8/16/19 17:59  7/27/19 08:01


 


 


 Nitroglycerin


  (Ntg)  0.4 mg  Q5M  PRN


 SL


 Prn Chest Pain  7/14/19 08:15


 8/13/19 08:14   


 


 


 Ondansetron HCl


  (Zofran)  4 mg  Q6H  PRN


 IVP


 Nausea & Vomiting  7/14/19 08:15


 8/13/19 08:14   


 


 


 Pantoprazole


  (Protonix)  40 mg  EVERY 12  HOURS


 ORAL


   7/14/19 21:00


 8/13/19 20:59  7/27/19 08:01


 


 


 Polyethylene


 Glycol


  (Miralax)  17 gm  DAILYPRN  PRN


 ORAL


 Constipation  7/14/19 08:15


 8/13/19 08:14  7/14/19 11:32


 


 


 Potassium Chloride


  (K-Dur)  40 meq  DAILY


 ORAL


   7/18/19 09:00


 8/17/19 08:59  7/27/19 08:00


 


 


 Trimethoprim/


 Sulfamethoxazole


  (Bactrim-DS)  1 tab  Q12HR


 ORAL


   7/23/19 21:00


 7/28/19 20:59  7/27/19 08:01


 

















Peterson King MD Jul 27, 2019 10:50

## 2019-07-27 NOTE — GENERAL PROGRESS NOTE
Assessment/Plan


Status:  stable, progressing


Assessment/Plan:


Assessment/Recs:


# Sepsis with cellulitis of left foot


--> on antibiotics


--> stable, progressing


--> seen by id is on bactrim and augmentin


# Hypokalemia 


--> KCl repleted





Appreciate consultant recs





Subjective


Constitutional:  Denies: no symptoms, chills, diaphoresis, fever, malaise, 

weakness, other


HEENT:  Denies: no symptoms, eye pain, blurred vision, tearing, double vision, 

ear pain, ear discharge, nose pain, nose congestion, throat pain, throat 

swelling, mouth pain, mouth swelling, other


Cardiovascular:  Denies: no symptoms, chest pain, edema, irregular heart rate, 

lightheadedness, palpitations, syncope, other


Respiratory:  Denies: no symptoms, cough, orthopnea, shortness of breath, SOB 

with excertion, SOB at rest, sputum, stridor, wheezing, other


Gastrointestinal/Abdominal:  Denies: no symptoms, abdomen distended, abdominal 

pain, black stools, tarry stools, blood in stool, constipated, diarrhea, 

difficulty swallowing, nausea, poor appetite, poor fluid intake, rectal bleeding

, vomiting, other


Genitourinary:  Denies: no symptoms, burning, discharge, frequency, flank pain, 

hematuria, incontinence, pain, urgency, other


Neurologic/Psychiatric:  Denies: no symptoms, anxiety, depressed, emotional 

problems, headache, numbness, paresthesia, pre-existing deficit, seizure, 

tingling, tremors, weakness, other


Allergies:  


Coded Allergies:  


     No Known Allergies (Unverified , 7/13/19)


Subjective


7/25: left leg still draining clearish fluid, otherwise no f/c, no night sweats


7/26: no issues reported, no fevers or chills, labs reviewed, lying in bed, a+o 

x4


7/27: no bleeding, no f/c, no night sweats, left diabetic foot with dressing





Objective





Last 24 Hour Vital Signs








  Date Time  Temp Pulse Resp B/P (MAP) Pulse Ox O2 Delivery O2 Flow Rate FiO2


 


7/27/19 19:19 98.4       


 


7/27/19 17:42 98.4       


 


7/27/19 16:00 98.4 74 20 108/61 (77) 94   


 


7/27/19 11:18 97.8 73 18 106/70 (82) 97   


 


7/27/19 09:00      Room Air  


 


7/27/19 08:00 96.3 78 18 100/63 (75) 98   


 


7/27/19 04:00 96.8 81 19 100/54 (69) 99   


 


7/27/19 00:00 97.8 80 19 112/70 (84) 99   

















Intake and Output  


 


 7/26/19 7/27/19





 19:00 07:00


 


Intake Total 840 ml 1600 ml


 


Output Total 1200 ml 1000 ml


 


Balance -360 ml 600 ml


 


  


 


Intake Oral 840 ml 600 ml


 


Other  1000 ml


 


Output Urine Total 1200 ml 1000 ml


 


# Voids  2


 


# Bowel Movements  2








Height (Feet):  6


Height (Inches):  1.00


Weight (Pounds):  155


Objective


EENT:  normal ENT inspection


Neck:  normal alignment


Cardiovascular:  normal peripheral pulses, normal rate, regular rhythm


Respiratory/Chest:  chest wall non-tender, lungs clear, normal breath sounds


Abdomen:  normal bowel sounds, non tender, soft


Extremities:  normal inspection


Edema:  1+ Arm (L), 1+ Arm (R), 1+ Leg (L), 1+ Leg (R), 1+ Pedal (L), 1+ Pedal (

R), 1+ Generalized


Edema:  trace edema


Neurologic:  motor weakness


Skin:  normal pigmentation, warm/dry, ++l foor sl red and swollen up to ankle











Jeffrey Vasquez MD Jul 27, 2019 22:06

## 2019-07-27 NOTE — PULMONOLOGY PROGRESS NOTE
Assessment/Plan


Problems:  


(1) Sepsis


(2) Cellulitis of left foot


Assessment/Plan


improving


doing better


on abx


check cultures


wound care


dvt prophylaxis. 


dc planning


waiting for placement





avoid IV narcotics





Subjective


ROS Limited/Unobtainable:  No


Constitutional:  Reports: no symptoms


HEENT:  Repors: no symptoms


Allergies:  


Coded Allergies:  


     No Known Allergies (Unverified , 7/13/19)





Objective





Last 24 Hour Vital Signs








  Date Time  Temp Pulse Resp B/P (MAP) Pulse Ox O2 Delivery O2 Flow Rate FiO2


 


7/27/19 04:00 96.8 81 19 100/54 (69) 99   


 


7/27/19 00:00 97.8 80 19 112/70 (84) 99   


 


7/26/19 21:41 98.1       


 


7/26/19 21:00      Room Air  


 


7/26/19 20:00 98.1 86 19 97/62 (74) 98   


 


7/26/19 17:23 98.1       


 


7/26/19 16:00 98.1 80 18 112/58 (76) 100   


 


7/26/19 12:00 98.1 87 18 134/73 (93) 100   


 


7/26/19 09:00      Room Air  


 


7/26/19 08:00 98.3 88 18 104/61 (75) 100   

















Intake and Output  


 


 7/26/19 7/27/19





 18:59 06:59


 


Intake Total 840 ml 1480 ml


 


Output Total 1200 ml 1000 ml


 


Balance -360 ml 480 ml


 


  


 


Intake Oral 840 ml 480 ml


 


Other  1000 ml


 


Output Urine Total 1200 ml 1000 ml


 


# Voids  2


 


# Bowel Movements  2








Objective


General Appearance:  WD/WN


HEENT:  normocephalic, atraumatic


Respiratory/Chest:  chest wall non-tender, lungs clear, normal breath sounds


Breasts:  no masses


Cardiovascular:  normal peripheral pulses, normal rate


Abdomen:  normal bowel sounds, soft, non tender


Genitourinary:  normal external genitalia


Extremities:  no cyanosis, less edema





Current Medications








 Medications


  (Trade)  Dose


 Ordered  Sig/Herlinda


 Route


 PRN Reason  Start Time


 Stop Time Status Last Admin


Dose Admin


 


 Acetaminophen


  (Tylenol)  650 mg  Q4H  PRN


 ORAL


 fever  7/14/19 08:15


 8/13/19 08:14  7/21/19 05:19


 


 


 Acetaminophen/


 Hydrocodone Bitart


  (Norco 10/325)  1 tab  Q4H  PRN


 ORAL


 For breakthrough pain  7/25/19 11:45


 8/1/19 11:44  7/27/19 06:38


 


 


 Amoxicillin/


 Clavulanate


 Potassium


  (Augmentin)  875 mg  EVERY 12  HOURS


 ORAL


   7/21/19 21:00


 7/28/19 20:59  7/26/19 21:05


 


 


 Artificial Tears


  (Lacri-Lube)  1 applic  QIDPRN  PRN


 BOTH EYES


 Dry Eyes  7/26/19 18:30


 8/25/19 18:14   


 


 


 Dextrose


  (Dextrose 50%)  25 ml  Q30M  PRN


 IV


 Hypoglycemia  7/14/19 08:30


 8/13/19 08:16   


 


 


 Dextrose


  (Dextrose 50%)  50 ml  Q30M  PRN


 IV


 hypoglycemia  7/14/19 08:30


 8/13/19 08:29   


 


 


 Docusate Sodium


  (Colace)  100 mg  THREE TIMES A  DAY


 ORAL


   7/14/19 18:00


 8/13/19 17:59  7/26/19 16:53


 


 


 Folic Acid


  (Folate)  3 mg  DAILY


 ORAL


   7/26/19 09:00


 8/25/19 08:59  7/26/19 08:41


 


 


 Heparin Sodium


  (Porcine)


  (Heparin 5000


 units/ml)  5,000 units  EVERY 12  HOURS


 SUBQ


   7/14/19 09:00


 8/13/19 08:59  7/26/19 21:06


 


 


 Hydrogen Peroxide


  (Hydrogen


 Peroxide)  1 applic  DAILY


 TOPIC


   7/26/19 17:15


 8/25/19 17:14  7/26/19 17:15


 


 


 Ibuprofen


  (Motrin)  600 mg  TID


 ORAL


   7/17/19 18:00


 8/16/19 17:59  7/26/19 16:53


 


 


 Nitroglycerin


  (Ntg)  0.4 mg  Q5M  PRN


 SL


 Prn Chest Pain  7/14/19 08:15


 8/13/19 08:14   


 


 


 Ondansetron HCl


  (Zofran)  4 mg  Q6H  PRN


 IVP


 Nausea & Vomiting  7/14/19 08:15


 8/13/19 08:14   


 


 


 Pantoprazole


  (Protonix)  40 mg  EVERY 12  HOURS


 ORAL


   7/14/19 21:00


 8/13/19 20:59  7/26/19 21:05


 


 


 Polyethylene


 Glycol


  (Miralax)  17 gm  DAILYPRN  PRN


 ORAL


 Constipation  7/14/19 08:15


 8/13/19 08:14  7/14/19 11:32


 


 


 Potassium Chloride


  (K-Dur)  40 meq  DAILY


 ORAL


   7/18/19 09:00


 8/17/19 08:59  7/26/19 08:24


 


 


 Trimethoprim/


 Sulfamethoxazole


  (Bactrim-DS)  1 tab  Q12HR


 ORAL


   7/23/19 21:00


 7/28/19 20:59  7/26/19 21:05


 

















Pia Coe MD Jul 27, 2019 07:26

## 2019-07-27 NOTE — NUR
NURSE NOTES:

Received report from IMER Chavarria. Patient alert, awake, and verbally responsive to let his 
needs known. Breathing unlabored and evenly without distress, discomfort, or SOB. Pain was 
verbalized, pharmacological measure was provided as ordered. Will reassess. IV site noted on 
LFA intact and currently saline locked. Left foot dressing noted intact and clean. Will 
follow wound care order. Bed placed at the lowest with brakes, alarm on, and side rails up x 
2 for safety. Call light placed within reach. Will continue to monitor and provide care as 
ordered.

## 2019-07-28 VITALS — DIASTOLIC BLOOD PRESSURE: 80 MMHG | SYSTOLIC BLOOD PRESSURE: 130 MMHG

## 2019-07-28 VITALS — DIASTOLIC BLOOD PRESSURE: 74 MMHG | SYSTOLIC BLOOD PRESSURE: 108 MMHG

## 2019-07-28 VITALS — DIASTOLIC BLOOD PRESSURE: 59 MMHG | SYSTOLIC BLOOD PRESSURE: 139 MMHG

## 2019-07-28 VITALS — DIASTOLIC BLOOD PRESSURE: 69 MMHG | SYSTOLIC BLOOD PRESSURE: 132 MMHG

## 2019-07-28 VITALS — DIASTOLIC BLOOD PRESSURE: 74 MMHG | SYSTOLIC BLOOD PRESSURE: 110 MMHG

## 2019-07-28 VITALS — DIASTOLIC BLOOD PRESSURE: 87 MMHG | SYSTOLIC BLOOD PRESSURE: 129 MMHG

## 2019-07-28 LAB
ADD MANUAL DIFF: NO
ANION GAP SERPL CALC-SCNC: 7 MMOL/L (ref 5–15)
BASOPHILS NFR BLD AUTO: 1 % (ref 0–2)
BUN SERPL-MCNC: 14 MG/DL (ref 7–18)
CALCIUM SERPL-MCNC: 9.9 MG/DL (ref 8.5–10.1)
CHLORIDE SERPL-SCNC: 101 MMOL/L (ref 98–107)
CO2 SERPL-SCNC: 27 MMOL/L (ref 21–32)
CREAT SERPL-MCNC: 1 MG/DL (ref 0.55–1.3)
EOSINOPHIL NFR BLD AUTO: 1.4 % (ref 0–3)
ERYTHROCYTE [DISTWIDTH] IN BLOOD BY AUTOMATED COUNT: 12.5 % (ref 11.6–14.8)
HCT VFR BLD CALC: 38.2 % (ref 42–52)
HGB BLD-MCNC: 12.3 G/DL (ref 14.2–18)
LYMPHOCYTES NFR BLD AUTO: 33.8 % (ref 20–45)
MCV RBC AUTO: 93 FL (ref 80–99)
MONOCYTES NFR BLD AUTO: 14.5 % (ref 1–10)
NEUTROPHILS NFR BLD AUTO: 49.3 % (ref 45–75)
PLATELET # BLD: 400 K/UL (ref 150–450)
POTASSIUM SERPL-SCNC: 4.2 MMOL/L (ref 3.5–5.1)
RBC # BLD AUTO: 4.1 M/UL (ref 4.7–6.1)
SODIUM SERPL-SCNC: 135 MMOL/L (ref 136–145)
WBC # BLD AUTO: 4.7 K/UL (ref 4.8–10.8)

## 2019-07-28 RX ADMIN — DOCUSATE SODIUM SCH MG: 100 CAPSULE, LIQUID FILLED ORAL at 08:17

## 2019-07-28 RX ADMIN — HYDROCODONE BITARTRATE AND ACETAMINOPHEN PRN TAB: 10; 325 TABLET ORAL at 20:18

## 2019-07-28 RX ADMIN — DOCUSATE SODIUM SCH MG: 100 CAPSULE, LIQUID FILLED ORAL at 17:03

## 2019-07-28 RX ADMIN — HEPARIN SODIUM SCH UNITS: 5000 INJECTION INTRAVENOUS; SUBCUTANEOUS at 08:21

## 2019-07-28 RX ADMIN — HYDROCODONE BITARTRATE AND ACETAMINOPHEN PRN TAB: 10; 325 TABLET ORAL at 12:07

## 2019-07-28 RX ADMIN — HEPARIN SODIUM SCH UNITS: 5000 INJECTION INTRAVENOUS; SUBCUTANEOUS at 20:19

## 2019-07-28 RX ADMIN — HYDROCODONE BITARTRATE AND ACETAMINOPHEN PRN TAB: 10; 325 TABLET ORAL at 04:17

## 2019-07-28 RX ADMIN — HYDROCODONE BITARTRATE AND ACETAMINOPHEN PRN TAB: 10; 325 TABLET ORAL at 16:13

## 2019-07-28 RX ADMIN — HYDROGEN PEROXIDE SCH APPLIC: 2.65 LIQUID TOPICAL at 09:00

## 2019-07-28 RX ADMIN — HYDROCODONE BITARTRATE AND ACETAMINOPHEN PRN TAB: 10; 325 TABLET ORAL at 08:16

## 2019-07-28 RX ADMIN — HYDROCODONE BITARTRATE AND ACETAMINOPHEN PRN TAB: 10; 325 TABLET ORAL at 00:17

## 2019-07-28 RX ADMIN — DOCUSATE SODIUM SCH MG: 100 CAPSULE, LIQUID FILLED ORAL at 12:06

## 2019-07-28 NOTE — NUR
CASE MANAGEMENT: REVIEW 



7/28/2019



SI:SEPSIS. 

T 97.8 HR 83 RR 20 B/P 108/74 SATS 98% ON RA 

WBC 4.7  



IS: PROTONIX PO Q12H 

K DUR PO QD

FOLATE QD

MOTRIN PO TID 



**MED/SURG STATUS***

## 2019-07-28 NOTE — NEPHROLOGY PROGRESS NOTE
Assessment/Plan


Problem List:  


(1) Cellulitis of left foot


(2) Sepsis


(3) Hyponatremia


(4) Hypoalbuminemia


Assessment


Left foot cellulitis / Leukocytosis  / No fever


Low Na


Low K


Anemia


High Lactic


Low Albumin


Plan











Isotonic solution


K supplement


DC IV fluids


monitor lytes


avoid nephrotoxics


urine tox screen positive for Amphetamines and Opiates


per orders


stable from renal stand if discharged





Subjective


ROS Limited/Unobtainable:  No





Objective


Objective





Last 24 Hour Vital Signs








  Date Time  Temp Pulse Resp B/P (MAP) Pulse Ox O2 Delivery O2 Flow Rate FiO2


 


7/28/19 12:37 97.7       


 


7/28/19 12:36 97.7       


 


7/28/19 11:37 97.7 86 18 129/87 (101) 99   


 


7/28/19 09:00      Room Air  


 


7/28/19 08:00 97.8 83 20 108/74 (85) 98   


 


7/28/19 04:00 97.8 84 18 139/59 (85) 97   


 


7/28/19 00:00 98.6 98 20 130/80 (97) 94   


 


7/27/19 21:00      Room Air  


 


7/27/19 20:00 99.8 89 17 115/66 (82) 97   


 


7/27/19 16:00 98.4 74 20 108/61 (77) 94   

















Intake and Output  


 


 7/27/19 7/28/19





 18:59 06:59


 


Intake Total 480 ml 


 


Output Total 1000 ml 1100 ml


 


Balance -520 ml -1100 ml


 


  


 


Intake Oral 480 ml 


 


Output Urine Total 1000 ml 1100 ml








Laboratory Tests


7/28/19 05:30: 


White Blood Count 4.7L, Red Blood Count 4.10L, Hemoglobin 12.3L, Hematocrit 

38.2L, Mean Corpuscular Volume 93, Mean Corpuscular Hemoglobin 30.1, Mean 

Corpuscular Hemoglobin Concent 32.3, Red Cell Distribution Width 12.5, Platelet 

Count 400, Mean Platelet Volume 5.0L, Neutrophils (%) (Auto) 49.3, Lymphocytes (

%) (Auto) 33.8, Monocytes (%) (Auto) 14.5H, Eosinophils (%) (Auto) 1.4, 

Basophils (%) (Auto) 1.0, Sodium Level 135L, Potassium Level 4.2, Chloride 

Level 101, Carbon Dioxide Level 27, Anion Gap 7, Blood Urea Nitrogen 14, 

Creatinine 1.0, Estimat Glomerular Filtration Rate > 60, Glucose Level 91, 

Calcium Level 9.9


Height (Feet):  6


Height (Inches):  1.00


Weight (Pounds):  155


General Appearance:  no apparent distress


Objective


no change











Manuel Norton MD Jul 28, 2019 15:06

## 2019-07-28 NOTE — NUR
NURSE NOTES:

Report received from IMER Madrigal. Pt in bed, awake, talkative, eating breakfast, left leg 
dressing dry and intact, will be changed today. Pt is A/Ox4, no apparent distress noted, 
discussed plan of care, bed in lowest position, call light within reach.

## 2019-07-28 NOTE — GENERAL PROGRESS NOTE
Assessment/Plan


Status:  stable, progressing


Assessment/Plan:


Assessment/Recs:


# Sepsis with cellulitis of left foot


--> on antibiotics


--> stable, progressing


--> seen by id is on bactrim and augmentin


# Hypokalemia 


--> KCl repleted


# Hyponatremia


--> ivf as needed


--> per renal


# IVDU


--> recommend cessation





Appreciate consultant recs





Subjective


Constitutional:  Denies: no symptoms, chills, diaphoresis, fever, malaise, 

weakness, other


HEENT:  Denies: no symptoms, eye pain, blurred vision, tearing, double vision, 

ear pain, ear discharge, nose pain, nose congestion, throat pain, throat 

swelling, mouth pain, mouth swelling, other


Respiratory:  Denies: no symptoms, cough, orthopnea, shortness of breath, SOB 

with excertion, SOB at rest, sputum, stridor, wheezing, other


Gastrointestinal/Abdominal:  Denies: no symptoms, abdomen distended, abdominal 

pain, black stools, tarry stools, blood in stool, constipated, diarrhea, 

difficulty swallowing, nausea, poor appetite, poor fluid intake, rectal bleeding

, vomiting, other


Neurologic/Psychiatric:  Denies: no symptoms, anxiety, depressed, emotional 

problems, headache, numbness, paresthesia, pre-existing deficit, seizure, 

tingling, tremors, weakness, other


Allergies:  


Coded Allergies:  


     No Known Allergies (Unverified , 7/13/19)


Subjective


7/25: left leg still draining clearish fluid, otherwise no f/c, no night sweats


7/26: no issues reported, no fevers or chills, labs reviewed, lying in bed, a+o 

x4


7/27: no bleeding, no f/c, no night sweats, left diabetic foot with dressing


7/28: Dressing on L foot intact and dry. asymptomatic, no events





Objective





Last 24 Hour Vital Signs








  Date Time  Temp Pulse Resp B/P (MAP) Pulse Ox O2 Delivery O2 Flow Rate FiO2


 


7/28/19 17:33 98.4       


 


7/28/19 16:43 98.4       


 


7/28/19 15:41 98.4 86 16 110/74 (86) 96   


 


7/28/19 11:37 97.7 86 18 129/87 (101) 99   


 


7/28/19 09:00      Room Air  


 


7/28/19 08:00 97.8 83 20 108/74 (85) 98   


 


7/28/19 04:00 97.8 84 18 139/59 (85) 97   


 


7/28/19 00:00 98.6 98 20 130/80 (97) 94   


 


7/27/19 21:00      Room Air  

















Intake and Output  


 


 7/27/19 7/28/19





 19:00 07:00


 


Intake Total 360 ml 


 


Output Total 1000 ml 1100 ml


 


Balance -640 ml -1100 ml


 


  


 


Intake Oral 360 ml 


 


Output Urine Total 1000 ml 1100 ml








Laboratory Tests


7/28/19 05:30: 


White Blood Count 4.7L, Red Blood Count 4.10L, Hemoglobin 12.3L, Hematocrit 

38.2L, Mean Corpuscular Volume 93, Mean Corpuscular Hemoglobin 30.1, Mean 

Corpuscular Hemoglobin Concent 32.3, Red Cell Distribution Width 12.5, Platelet 

Count 400, Mean Platelet Volume 5.0L, Neutrophils (%) (Auto) 49.3, Lymphocytes (

%) (Auto) 33.8, Monocytes (%) (Auto) 14.5H, Eosinophils (%) (Auto) 1.4, 

Basophils (%) (Auto) 1.0, Sodium Level 135L, Potassium Level 4.2, Chloride 

Level 101, Carbon Dioxide Level 27, Anion Gap 7, Blood Urea Nitrogen 14, 

Creatinine 1.0, Estimat Glomerular Filtration Rate > 60, Glucose Level 91, 

Calcium Level 9.9


Height (Feet):  6


Height (Inches):  1.00


Weight (Pounds):  155


Objective


EENT:  normal ENT inspection


Neck:  normal alignment


Cardiovascular:  normal peripheral pulses, normal rate, regular rhythm


Respiratory/Chest:  chest wall non-tender, lungs clear, normal breath sounds


Abdomen:  normal bowel sounds, non tender, soft


Extremities:  normal inspection


Edema:  1+ Arm (L), 1+ Arm (R), 1+ Leg (L), 1+ Leg (R), 1+ Pedal (L), 1+ Pedal (

R), 1+ Generalized


Edema:  trace edema


Neurologic:  motor weakness


Skin:  normal pigmentation, warm/dry, ++l foor sl red and swollen up to ankle











Jeffrey Vasquez MD Jul 28, 2019 20:31

## 2019-07-28 NOTE — PULMONOLOGY PROGRESS NOTE
Assessment/Plan


Problems:  


(1) Sepsis


(2) Cellulitis of left foot


Assessment/Plan


improving


doing better


on abx


check cultures


wound care


dvt prophylaxis. 


dc planning


waiting for placement





avoid IV narcotics





Subjective


ROS Limited/Unobtainable:  No


Allergies:  


Coded Allergies:  


     No Known Allergies (Unverified , 7/13/19)





Objective





Last 24 Hour Vital Signs








  Date Time  Temp Pulse Resp B/P (MAP) Pulse Ox O2 Delivery O2 Flow Rate FiO2


 


7/28/19 04:00 97.8 84 18 139/59 (85) 97   


 


7/28/19 00:00 98.6 98 20 130/80 (97) 94   


 


7/27/19 21:00      Room Air  


 


7/27/19 20:00 99.8 89 17 115/66 (82) 97   


 


7/27/19 19:19 98.4       


 


7/27/19 17:42 98.4       


 


7/27/19 16:00 98.4 74 20 108/61 (77) 94   


 


7/27/19 11:18 97.8 73 18 106/70 (82) 97   


 


7/27/19 09:00      Room Air  


 


7/27/19 08:00 96.3 78 18 100/63 (75) 98   

















Intake and Output  


 


 7/27/19 7/28/19





 19:00 07:00


 


Intake Total 360 ml 


 


Output Total 1000 ml 1100 ml


 


Balance -640 ml -1100 ml


 


  


 


Intake Oral 360 ml 


 


Output Urine Total 1000 ml 1100 ml








Objective


General Appearance:  WD/WN


HEENT:  normocephalic, atraumatic


Respiratory/Chest:  chest wall non-tender, lungs clear, normal breath sounds


Breasts:  no masses


Cardiovascular:  normal peripheral pulses, normal rate


Abdomen:  normal bowel sounds, soft, non tender


Genitourinary:  normal external genitalia


Extremities:  no cyanosis, less edema


Laboratory Tests


7/28/19 05:30: 


White Blood Count 4.7L, Red Blood Count 4.10L, Hemoglobin 12.3L, Hematocrit 

38.2L, Mean Corpuscular Volume 93, Mean Corpuscular Hemoglobin 30.1, Mean 

Corpuscular Hemoglobin Concent 32.3, Red Cell Distribution Width 12.5, Platelet 

Count 400, Mean Platelet Volume 5.0L, Neutrophils (%) (Auto) 49.3, Lymphocytes (

%) (Auto) 33.8, Monocytes (%) (Auto) 14.5H, Eosinophils (%) (Auto) 1.4, 

Basophils (%) (Auto) 1.0, Sodium Level 135L, Potassium Level 4.2, Chloride 

Level 101, Carbon Dioxide Level 27, Anion Gap 7, Blood Urea Nitrogen 14, 

Creatinine 1.0, Estimat Glomerular Filtration Rate > 60, Glucose Level 91, 

Calcium Level 9.9





Current Medications








 Medications


  (Trade)  Dose


 Ordered  Sig/Herlinda


 Route


 PRN Reason  Start Time


 Stop Time Status Last Admin


Dose Admin


 


 Acetaminophen


  (Tylenol)  650 mg  Q4H  PRN


 ORAL


 fever  7/14/19 08:15


 8/13/19 08:14  7/21/19 05:19


 


 


 Acetaminophen/


 Hydrocodone Bitart


  (Norco 10/325)  1 tab  Q4H  PRN


 ORAL


 For breakthrough pain  7/25/19 11:45


 8/1/19 11:44  7/28/19 04:17


 


 


 Artificial Tears


  (Lacri-Lube)  1 applic  QIDPRN  PRN


 BOTH EYES


 Dry Eyes  7/26/19 18:30


 8/25/19 18:14   


 


 


 Dextrose


  (Dextrose 50%)  25 ml  Q30M  PRN


 IV


 Hypoglycemia  7/14/19 08:30


 8/13/19 08:16   


 


 


 Dextrose


  (Dextrose 50%)  50 ml  Q30M  PRN


 IV


 hypoglycemia  7/14/19 08:30


 8/13/19 08:29   


 


 


 Docusate Sodium


  (Colace)  100 mg  THREE TIMES A  DAY


 ORAL


   7/14/19 18:00


 8/13/19 17:59  7/27/19 17:12


 


 


 Folic Acid


  (Folate)  3 mg  DAILY


 ORAL


   7/26/19 09:00


 8/25/19 08:59  7/27/19 08:02


 


 


 Heparin Sodium


  (Porcine)


  (Heparin 5000


 units/ml)  5,000 units  EVERY 12  HOURS


 SUBQ


   7/14/19 09:00


 8/13/19 08:59  7/27/19 21:10


 


 


 Hydrogen Peroxide


  (Hydrogen


 Peroxide)  1 applic  DAILY


 TOPIC


   7/26/19 17:15


 8/25/19 17:14  7/27/19 08:04


 


 


 Ibuprofen


  (Motrin)  600 mg  TID


 ORAL


   7/17/19 18:00


 8/16/19 17:59  7/27/19 17:12


 


 


 Nitroglycerin


  (Ntg)  0.4 mg  Q5M  PRN


 SL


 Prn Chest Pain  7/14/19 08:15


 8/13/19 08:14   


 


 


 Ondansetron HCl


  (Zofran)  4 mg  Q6H  PRN


 IVP


 Nausea & Vomiting  7/14/19 08:15


 8/13/19 08:14   


 


 


 Pantoprazole


  (Protonix)  40 mg  EVERY 12  HOURS


 ORAL


   7/14/19 21:00


 8/13/19 20:59  7/27/19 21:09


 


 


 Polyethylene


 Glycol


  (Miralax)  17 gm  DAILYPRN  PRN


 ORAL


 Constipation  7/14/19 08:15


 8/13/19 08:14  7/14/19 11:32


 


 


 Potassium Chloride


  (K-Dur)  40 meq  DAILY


 ORAL


   7/18/19 09:00


 8/17/19 08:59  7/27/19 08:00


 

















Pia Coe MD Jul 28, 2019 07:36

## 2019-07-28 NOTE — NUR
NURSE NOTES:

Received report from IMER Nicole. Patient is in bed. AAO x 4. On room air. Dressing on L foot 
intact and dry. IV on L FA intact and asymptomatic. No acute distress noted at this time. 
Bed locked, lowest position, alarm on, side rails up x 2, call light within reach. Will 
continue to monitor.

## 2019-07-29 VITALS — SYSTOLIC BLOOD PRESSURE: 128 MMHG | DIASTOLIC BLOOD PRESSURE: 75 MMHG

## 2019-07-29 VITALS — SYSTOLIC BLOOD PRESSURE: 122 MMHG | DIASTOLIC BLOOD PRESSURE: 72 MMHG

## 2019-07-29 VITALS — DIASTOLIC BLOOD PRESSURE: 55 MMHG | SYSTOLIC BLOOD PRESSURE: 94 MMHG

## 2019-07-29 VITALS — DIASTOLIC BLOOD PRESSURE: 76 MMHG | SYSTOLIC BLOOD PRESSURE: 113 MMHG

## 2019-07-29 VITALS — SYSTOLIC BLOOD PRESSURE: 105 MMHG | DIASTOLIC BLOOD PRESSURE: 64 MMHG

## 2019-07-29 VITALS — SYSTOLIC BLOOD PRESSURE: 93 MMHG | DIASTOLIC BLOOD PRESSURE: 60 MMHG

## 2019-07-29 LAB
ADD MANUAL DIFF: NO
ANION GAP SERPL CALC-SCNC: 5 MMOL/L (ref 5–15)
BASOPHILS NFR BLD AUTO: 1 % (ref 0–2)
BUN SERPL-MCNC: 18 MG/DL (ref 7–18)
CALCIUM SERPL-MCNC: 9.4 MG/DL (ref 8.5–10.1)
CHLORIDE SERPL-SCNC: 102 MMOL/L (ref 98–107)
CO2 SERPL-SCNC: 30 MMOL/L (ref 21–32)
CREAT SERPL-MCNC: 1.1 MG/DL (ref 0.55–1.3)
EOSINOPHIL NFR BLD AUTO: 1.9 % (ref 0–3)
ERYTHROCYTE [DISTWIDTH] IN BLOOD BY AUTOMATED COUNT: 12.9 % (ref 11.6–14.8)
HCT VFR BLD CALC: 37.4 % (ref 42–52)
HGB BLD-MCNC: 12.1 G/DL (ref 14.2–18)
LYMPHOCYTES NFR BLD AUTO: 42.2 % (ref 20–45)
MCV RBC AUTO: 93 FL (ref 80–99)
MONOCYTES NFR BLD AUTO: 12.1 % (ref 1–10)
NEUTROPHILS NFR BLD AUTO: 42.8 % (ref 45–75)
PLATELET # BLD: 365 K/UL (ref 150–450)
POTASSIUM SERPL-SCNC: 4.5 MMOL/L (ref 3.5–5.1)
RBC # BLD AUTO: 4.01 M/UL (ref 4.7–6.1)
SODIUM SERPL-SCNC: 137 MMOL/L (ref 136–145)
WBC # BLD AUTO: 5 K/UL (ref 4.8–10.8)

## 2019-07-29 RX ADMIN — HYDROCODONE BITARTRATE AND ACETAMINOPHEN PRN TAB: 10; 325 TABLET ORAL at 12:26

## 2019-07-29 RX ADMIN — HEPARIN SODIUM SCH UNITS: 5000 INJECTION INTRAVENOUS; SUBCUTANEOUS at 08:36

## 2019-07-29 RX ADMIN — HYDROCODONE BITARTRATE AND ACETAMINOPHEN PRN TAB: 10; 325 TABLET ORAL at 00:21

## 2019-07-29 RX ADMIN — HYDROCODONE BITARTRATE AND ACETAMINOPHEN PRN TAB: 10; 325 TABLET ORAL at 20:50

## 2019-07-29 RX ADMIN — HYDROCODONE BITARTRATE AND ACETAMINOPHEN PRN TAB: 10; 325 TABLET ORAL at 08:33

## 2019-07-29 RX ADMIN — HYDROCODONE BITARTRATE AND ACETAMINOPHEN PRN TAB: 10; 325 TABLET ORAL at 16:30

## 2019-07-29 RX ADMIN — DOCUSATE SODIUM SCH MG: 100 CAPSULE, LIQUID FILLED ORAL at 17:10

## 2019-07-29 RX ADMIN — HEPARIN SODIUM SCH UNITS: 5000 INJECTION INTRAVENOUS; SUBCUTANEOUS at 20:55

## 2019-07-29 RX ADMIN — DOCUSATE SODIUM SCH MG: 100 CAPSULE, LIQUID FILLED ORAL at 08:33

## 2019-07-29 RX ADMIN — DOCUSATE SODIUM SCH MG: 100 CAPSULE, LIQUID FILLED ORAL at 12:25

## 2019-07-29 RX ADMIN — HYDROGEN PEROXIDE SCH APPLIC: 2.65 LIQUID TOPICAL at 08:43

## 2019-07-29 RX ADMIN — HYDROCODONE BITARTRATE AND ACETAMINOPHEN PRN TAB: 10; 325 TABLET ORAL at 04:27

## 2019-07-29 NOTE — PODIATRIC PROGRESS NOTE
Assessment/Plan


Patient


Ray Gonzalez is a 59 year old male who was admitted on Jul 13, 2019 at 22:

58 with


Problems:  


(1) Diabetes mellitus type 2 with neurological manifestations


(2) Foot ulcer due to secondary DM


(3) Cellulitis of left foot


Assessment/Plan


Continue Xerform to the L leg and Lateral foot ulcer. 


Santyl to L dorsal foot ulcer.





Subjective


Allergies:  


Coded Allergies:  


     No Known Allergies (Unverified , 7/13/19)


Subjective


patient seen by bedside in [place] f/u L foot ulcer





Objective


Exam





Last 24 Hour Vital Signs








  Date Time  Temp Pulse Resp B/P (MAP) Pulse Ox O2 Delivery O2 Flow Rate FiO2


 


7/29/19 08:16      Room Air  


 


7/29/19 08:10 98.0 73 18 128/75 (92) 98   


 


7/29/19 04:00 97.5 70 18 113/76 (88) 100   


 


7/29/19 00:00 97.9 71 18 122/72 (89) 98   


 


7/28/19 21:00      Room Air  


 


7/28/19 20:00 97.9 84 20 132/69 (90) 96   


 


7/28/19 17:33 98.4       


 


7/28/19 16:43 98.4       


 


7/28/19 15:41 98.4 86 16 110/74 (86) 96   


 


7/28/19 11:37 97.7 86 18 129/87 (101) 99   











Laboratory Tests








Test


  7/29/19


05:20


 


White Blood Count


  5.0 K/UL


(4.8-10.8)


 


Red Blood Count


  4.01 M/UL


(4.70-6.10)  L


 


Hemoglobin


  12.1 G/DL


(14.2-18.0)  L


 


Hematocrit


  37.4 %


(42.0-52.0)  L


 


Mean Corpuscular Volume 93 FL (80-99)  


 


Mean Corpuscular Hemoglobin


  30.2 PG


(27.0-31.0)


 


Mean Corpuscular Hemoglobin


Concent 32.4 G/DL


(32.0-36.0)


 


Red Cell Distribution Width


  12.9 %


(11.6-14.8)


 


Platelet Count


  365 K/UL


(150-450)


 


Mean Platelet Volume


  5.2 FL


(6.5-10.1)  L


 


Neutrophils (%) (Auto)


  42.8 %


(45.0-75.0)  L


 


Lymphocytes (%) (Auto)


  42.2 %


(20.0-45.0)


 


Monocytes (%) (Auto)


  12.1 %


(1.0-10.0)  H


 


Eosinophils (%) (Auto)


  1.9 %


(0.0-3.0)


 


Basophils (%) (Auto)


  1.0 %


(0.0-2.0)


 


Sodium Level


  137 MMOL/L


(136-145)


 


Potassium Level


  4.5 MMOL/L


(3.5-5.1)


 


Chloride Level


  102 MMOL/L


()


 


Carbon Dioxide Level


  30 MMOL/L


(21-32)


 


Anion Gap


  5 mmol/L


(5-15)


 


Blood Urea Nitrogen


  18 mg/dL


(7-18)


 


Creatinine


  1.1 MG/DL


(0.55-1.30)


 


Estimat Glomerular Filtration


Rate > 60 mL/min


(>60)


 


Glucose Level


  105 MG/DL


()


 


Calcium Level


  9.4 MG/DL


(8.5-10.1)











Microbiology








 Date/Time


Source Procedure


Growth Status


 


 


 7/13/19 20:27


Blood Blood Culture - Final


NO GROWTH AFTER 5 DAYS Complete


 


 7/13/19 20:25


Nasal Nares MRSA Culture - Final


NO METHICILLIN RESISTANT STAPH AUREUS... Complete





 7/14/19 11:30


Leg Left Gram Stain - Final Complete


 


 7/14/19 11:30 Wound Culture - Final


Klebsiella Oxytoca


Citrobacter Freundii


Proteus Mirabilis


Streptococcus Group A


Staphylococcus Aureus - Mrsa Complete











Dermatological


Dermatological Narrative


swelling is down to the left leg and foot.


L lateral leg ulcer in granulating and clear of necrotic tissue. no drainage no 

discharge no pus no cellulitis. probs to subq/ tendon level. 


Left lateral ankle ulcer is healing. ulcer is smaller and closing. no discharge 

no pus. probs to subq. 


L dorsal foot ulcer is probing to tendon level + fibrotic base + clear drainage 

no pus no cellulites











Shimon Weston DPM Jul 29, 2019 10:47

## 2019-07-29 NOTE — NUR
HAND-OFF: 

Report given to IMER Odell., resting comfortably in bed, patient free from pain and  injury



imer rea

## 2019-07-29 NOTE — GENERAL PROGRESS NOTE
Assessment/Plan


Status:  stable, progressing


Assessment/Plan:


Assessment/Recs:


# Sepsis with cellulitis of left foot


--> on antibiotics


--> stable, progressing


--> seen by id is on bactrim and augmentin


--> slowly improving


# Hypokalemia 


--> KCl repleted


# Hyponatremia


--> ivf as needed


--> per renal


# IVDU


--> recommend cessation





Appreciate consultant recs





Subjective


Constitutional:  Denies: no symptoms, chills, diaphoresis, fever, malaise, 

weakness, other


HEENT:  Denies: no symptoms, eye pain, blurred vision, tearing, double vision, 

ear pain, ear discharge, nose pain, nose congestion, throat pain, throat 

swelling, mouth pain, mouth swelling, other


Cardiovascular:  Denies: no symptoms, chest pain, edema, irregular heart rate, 

lightheadedness, palpitations, syncope, other


Respiratory:  Denies: no symptoms, cough, orthopnea, shortness of breath, SOB 

with excertion, SOB at rest, sputum, stridor, wheezing, other


Gastrointestinal/Abdominal:  Denies: no symptoms, abdomen distended, abdominal 

pain, black stools, tarry stools, blood in stool, constipated, diarrhea, 

difficulty swallowing, nausea, poor appetite, poor fluid intake, rectal bleeding

, vomiting, other


Genitourinary:  Denies: no symptoms, burning, discharge, frequency, flank pain, 

hematuria, incontinence, pain, urgency, other


Neurologic/Psychiatric:  Denies: no symptoms, anxiety, depressed, emotional 

problems, headache, numbness, paresthesia, pre-existing deficit, seizure, 

tingling, tremors, weakness, other


Endocrine:  Denies: no symptoms, excessive sweating, flushing, intolerance to 

cold, intolerance to heat, increased hunger, increased thirst, increased urine, 

unexplained weight gain, unexplained weight loss, other


Hematologic/Lymphatic:  Denies: no symptoms, anemia, easy bleeding, easy 

bruising, other


Allergies:  


Coded Allergies:  


     No Known Allergies (Unverified , 7/13/19)


Subjective


7/25: left leg still draining clearish fluid, otherwise no f/c, no night sweats


7/26: no issues reported, no fevers or chills, labs reviewed, lying in bed, a+o 

x4


7/27: no bleeding, no f/c, no night sweats, left diabetic foot with dressing


7/28: Dressing on L foot intact and dry. asymptomatic, no events


7/29: no fevers or chills, on heparin sq, no bleeding noted





Objective





Last 24 Hour Vital Signs








  Date Time  Temp Pulse Resp B/P (MAP) Pulse Ox O2 Delivery O2 Flow Rate FiO2


 


7/29/19 08:16      Room Air  


 


7/29/19 08:10 98.0 73 18 128/75 (92) 98   


 


7/29/19 04:00 97.5 70 18 113/76 (88) 100   


 


7/29/19 00:00 97.9 71 18 122/72 (89) 98   


 


7/28/19 21:00      Room Air  


 


7/28/19 20:00 97.9 84 20 132/69 (90) 96   


 


7/28/19 17:33 98.4       


 


7/28/19 16:43 98.4       


 


7/28/19 15:41 98.4 86 16 110/74 (86) 96   


 


7/28/19 11:37 97.7 86 18 129/87 (101) 99   

















Intake and Output  


 


 7/28/19 7/29/19





 19:00 07:00


 


Output Total 1400 ml 1200 ml


 


Balance -1400 ml -1200 ml


 


  


 


Output Urine Total 1400 ml 1200 ml








Laboratory Tests


7/29/19 05:20: 


White Blood Count 5.0, Red Blood Count 4.01L, Hemoglobin 12.1L, Hematocrit 37.4L

, Mean Corpuscular Volume 93, Mean Corpuscular Hemoglobin 30.2, Mean 

Corpuscular Hemoglobin Concent 32.4, Red Cell Distribution Width 12.9, Platelet 

Count 365, Mean Platelet Volume 5.2L, Neutrophils (%) (Auto) 42.8L, Lymphocytes 

(%) (Auto) 42.2, Monocytes (%) (Auto) 12.1H, Eosinophils (%) (Auto) 1.9, 

Basophils (%) (Auto) 1.0, Sodium Level 137, Potassium Level 4.5, Chloride Level 

102, Carbon Dioxide Level 30, Anion Gap 5, Blood Urea Nitrogen 18, Creatinine 

1.1, Estimat Glomerular Filtration Rate > 60, Glucose Level 105, Calcium Level 

9.4


Height (Feet):  6


Height (Inches):  1.00


Weight (Pounds):  155


Objective


EENT:  normal ENT inspection


Neck:  normal alignment


Cardiovascular:  normal peripheral pulses, normal rate, regular rhythm


Respiratory/Chest:  chest wall non-tender, lungs clear, normal breath sounds


Abdomen:  normal bowel sounds, non tender, soft


Extremities:  normal inspection


Edema:  trace edema


Neurologic:  motor weakness


Skin:  normal pigmentation, warm/dry, ++l foor sl red and swollen up to ankle











Jeffrey Vasquez MD Jul 29, 2019 09:15

## 2019-07-29 NOTE — INFECTIOUS DISEASES PROG NOTE
Assessment/Plan


Assessment/Plan


A:








58 yo male who presnted to the ED on 7/12/19 with left leg cellulitis.





Left foot cellulitis; improving


    -wound cx: MRSA, GAS, P.  mirabilsi (Gonzalez S), C. freundi (R ancef, otherwise 

S), K. oxytoca (R amp, otherwise S)


    Leukocytosis ;SP


    Low grade fevers; SP


       -Tibia/fibula MRI: Negative for evidence of osteomyelitis. Evidence of 

superficial soft tissue ulcer, marked by a marker at the anteromedial shin 

region.Considerable subcutaneous fat edema. Given stated clinical history, 

probably on the basis of cellulitis. Correlate with clinical findings. No 

evidence of drainable abscess.


      -foot MRI:  Extensive soft tissue edema, as described. This is in keeping 

with stated clinical history of cellulitis. No findings to suggest abscess Very 

superficial fluid collections likely relate to stated clinical history of 

dorsal blisters. No marrow abnormality to suggest acute osteomyelitis 

demonstrated.


      -ANkle MRI:  Patchy areas of marrow edema, as described. Periarticular 

distribution of this is suggestive of arthropathy which may be degenerative, 

inflammatory, or, most likely, on the basis of Charcot-type changes. Per 

technologist, there are no ulcers in the area so osteomyelitis is deemed much 

less likely. Extensive edema of the subcutaneous fat. Given stated clinical 

history of cellulitis most likely on the basis of such. Correlate with clinical 

findings. No drainable


abscess collection demonstrated.


 


        -Foot xray: No acute injury identified. Soft tissue swelling. Multiple 

incidental findings as described











PLAN:





 - Continue to monitor off abx


     -7/28 SP Bactrim and Augmentin #6


     -7/21 SP Cefepime #8, IV Vancomycin #8





 - Monitor CBC and Temps


 -Podiatry f/u





Subjective


Allergies:  


Coded Allergies:  


     No Known Allergies (Unverified , 7/13/19)


Subjective


afebrile


no leukocytosis 


awaiting placement


now off abx





Objective


Vital Signs





Last 24 Hour Vital Signs








  Date Time  Temp Pulse Resp B/P (MAP) Pulse Ox O2 Delivery O2 Flow Rate FiO2


 


7/29/19 12:05 97.9 70 18 105/64 (78) 98   


 


7/29/19 08:16      Room Air  


 


7/29/19 08:10 98.0 73 18 128/75 (92) 98   


 


7/29/19 04:00 97.5 70 18 113/76 (88) 100   


 


7/29/19 00:00 97.9 71 18 122/72 (89) 98   


 


7/28/19 21:00      Room Air  


 


7/28/19 20:00 97.9 84 20 132/69 (90) 96   


 


7/28/19 17:33 98.4       


 


7/28/19 16:43 98.4       


 


7/28/19 15:41 98.4 86 16 110/74 (86) 96   








Height (Feet):  6


Height (Inches):  1.00


Weight (Pounds):  155


Objective


Gen:  NAD


HEENT: NCAT, MMM, EOMI, PERRL, No Oral lesion, no scleral icterus 


NECK:  full range of motion, supple, no meningismus, No LAD, No JVD


LUNGS:  CTAB, No W/C, No Accessory muscle use


CARDS: RRR, S1, S2, No M/R/G, 


ABD: Soft, NT, ND, No R/G, + BS, No HSM, No Masses


: Deferred


Ext: C/C/E, Pulses 2+ B/L (DP, Rad): LLE swelling with erythema and pain, 

Ulceration present with purulence.


NEURO: A/O x 4, Strength and Sensation Grossly intact


PSYCH: Normal mood and affect


SKIN:  Warm/dry, No rashes





Laboratory Tests








Test


  7/29/19


05:20


 


White Blood Count


  5.0 K/UL


(4.8-10.8)


 


Red Blood Count


  4.01 M/UL


(4.70-6.10)  L


 


Hemoglobin


  12.1 G/DL


(14.2-18.0)  L


 


Hematocrit


  37.4 %


(42.0-52.0)  L


 


Mean Corpuscular Volume 93 FL (80-99)  


 


Mean Corpuscular Hemoglobin


  30.2 PG


(27.0-31.0)


 


Mean Corpuscular Hemoglobin


Concent 32.4 G/DL


(32.0-36.0)


 


Red Cell Distribution Width


  12.9 %


(11.6-14.8)


 


Platelet Count


  365 K/UL


(150-450)


 


Mean Platelet Volume


  5.2 FL


(6.5-10.1)  L


 


Neutrophils (%) (Auto)


  42.8 %


(45.0-75.0)  L


 


Lymphocytes (%) (Auto)


  42.2 %


(20.0-45.0)


 


Monocytes (%) (Auto)


  12.1 %


(1.0-10.0)  H


 


Eosinophils (%) (Auto)


  1.9 %


(0.0-3.0)


 


Basophils (%) (Auto)


  1.0 %


(0.0-2.0)


 


Sodium Level


  137 MMOL/L


(136-145)


 


Potassium Level


  4.5 MMOL/L


(3.5-5.1)


 


Chloride Level


  102 MMOL/L


()


 


Carbon Dioxide Level


  30 MMOL/L


(21-32)


 


Anion Gap


  5 mmol/L


(5-15)


 


Blood Urea Nitrogen


  18 mg/dL


(7-18)


 


Creatinine


  1.1 MG/DL


(0.55-1.30)


 


Estimat Glomerular Filtration


Rate > 60 mL/min


(>60)


 


Glucose Level


  105 MG/DL


()


 


Calcium Level


  9.4 MG/DL


(8.5-10.1)











Current Medications








 Medications


  (Trade)  Dose


 Ordered  Sig/Herlinda


 Route


 PRN Reason  Start Time


 Stop Time Status Last Admin


Dose Admin


 


 Acetaminophen


  (Tylenol)  650 mg  Q4H  PRN


 ORAL


 fever  7/14/19 08:15


 8/13/19 08:14  7/21/19 05:19


 


 


 Acetaminophen/


 Hydrocodone Bitart


  (Norco 10/325)  1 tab  Q4H  PRN


 ORAL


 For breakthrough pain  7/25/19 11:45


 8/1/19 11:44  7/29/19 12:26


 


 


 Artificial Tears


  (Lacri-Lube)  1 applic  QIDPRN  PRN


 BOTH EYES


 Dry Eyes  7/26/19 18:30


 8/25/19 18:14   


 


 


 Dextrose


  (Dextrose 50%)  25 ml  Q30M  PRN


 IV


 Hypoglycemia  7/14/19 08:30


 8/13/19 08:16   


 


 


 Dextrose


  (Dextrose 50%)  50 ml  Q30M  PRN


 IV


 hypoglycemia  7/14/19 08:30


 8/13/19 08:29   


 


 


 Docusate Sodium


  (Colace)  100 mg  THREE TIMES A  DAY


 ORAL


   7/14/19 18:00


 8/13/19 17:59  7/29/19 12:25


 


 


 Folic Acid


  (Folate)  3 mg  DAILY


 ORAL


   7/26/19 09:00


 8/25/19 08:59  7/29/19 08:33


 


 


 Heparin Sodium


  (Porcine)


  (Heparin 5000


 units/ml)  5,000 units  EVERY 12  HOURS


 SUBQ


   7/14/19 09:00


 8/13/19 08:59  7/29/19 08:36


 


 


 Hydrogen Peroxide


  (Hydrogen


 Peroxide)  1 applic  DAILY


 TOPIC


   7/26/19 17:15


 8/25/19 17:14  7/29/19 08:43


 


 


 Ibuprofen


  (Motrin)  600 mg  TID


 ORAL


   7/17/19 18:00


 8/16/19 17:59  7/29/19 12:26


 


 


 Nitroglycerin


  (Ntg)  0.4 mg  Q5M  PRN


 SL


 Prn Chest Pain  7/14/19 08:15


 8/13/19 08:14   


 


 


 Ondansetron HCl


  (Zofran)  4 mg  Q6H  PRN


 IVP


 Nausea & Vomiting  7/14/19 08:15


 8/13/19 08:14   


 


 


 Pantoprazole


  (Protonix)  40 mg  EVERY 12  HOURS


 ORAL


   7/14/19 21:00


 8/13/19 20:59  7/29/19 08:32


 


 


 Polyethylene


 Glycol


  (Miralax)  17 gm  DAILYPRN  PRN


 ORAL


 Constipation  7/14/19 08:15


 8/13/19 08:14  7/14/19 11:32


 


 


 Potassium Chloride


  (K-Dur)  40 meq  DAILY


 ORAL


   7/18/19 09:00


 8/17/19 08:59  7/29/19 08:33


 

















Sarah Sinclair M.D. Jul 29, 2019 13:31

## 2019-07-29 NOTE — NUR
RD ASSESSMENT & RECOMMENDATIONS

SEE CARE ACTIVITY FOR COMPLETE ASSESSMENT



DAILY ESTIMATED NEEDS:

Needs based on Wound/ 75.5kg 

25-30  kcals/kg 

5279-7273  total kcals

1.25-1.5  g protein/kg

  g total protein 

25-30  mL/kg

[id number]  total fluid mLs



NUTRITION DIAGNOSIS:

* Increased protein intake needs R/T wound healing as evidenced by pt

admitted w/ traumatic ulceration to the left shin.

* Altered nutrition related lab values R/T pre-diabetes as evidenced by

A1C of 6.4.



CURRENT DIET: Now Regular     





PO DIET RECOMMENDATIONS:

[facility] 





ADDITIONAL RECOMMENDATIONS:

* Standing wt for accurate CBW / or calibrated bed scale  

* Wound healing: add MVI x 1, Vit C 250mg QD 

    - add Fabian 1pkt BID 

* Monitor BGs closely, need for hypoglycemics: A1C of 6.4 

       -> Rec carb controlled diet

## 2019-07-29 NOTE — NEPHROLOGY PROGRESS NOTE
Assessment/Plan


Problem List:  


(1) Cellulitis of left foot


(2) Sepsis


(3) Hyponatremia


(4) Hypoalbuminemia


Assessment


Left foot cellulitis / Leukocytosis  / No fever


Low Na


Low K


Anemia


High Lactic


Low Albumin


Plan











Isotonic solution


K supplement


DC IV fluids


monitor lytes


avoid nephrotoxics


urine tox screen positive for Amphetamines and Opiates


per orders


stable from renal stand if discharged





Subjective


ROS Limited/Unobtainable:  No





Objective


Objective





Last 24 Hour Vital Signs








  Date Time  Temp Pulse Resp B/P (MAP) Pulse Ox O2 Delivery O2 Flow Rate FiO2


 


7/29/19 08:16      Room Air  


 


7/29/19 08:10 98.0 73 18 128/75 (92) 98   


 


7/29/19 04:00 97.5 70 18 113/76 (88) 100   


 


7/29/19 00:00 97.9 71 18 122/72 (89) 98   


 


7/28/19 21:00      Room Air  


 


7/28/19 20:00 97.9 84 20 132/69 (90) 96   


 


7/28/19 17:33 98.4       


 


7/28/19 16:43 98.4       


 


7/28/19 15:41 98.4 86 16 110/74 (86) 96   


 


7/28/19 11:37 97.7 86 18 129/87 (101) 99   

















Intake and Output  


 


 7/28/19 7/29/19





 19:00 07:00


 


Output Total 1400 ml 1200 ml


 


Balance -1400 ml -1200 ml


 


  


 


Output Urine Total 1400 ml 1200 ml








Laboratory Tests


7/29/19 05:20: 


White Blood Count 5.0, Red Blood Count 4.01L, Hemoglobin 12.1L, Hematocrit 37.4L

, Mean Corpuscular Volume 93, Mean Corpuscular Hemoglobin 30.2, Mean 

Corpuscular Hemoglobin Concent 32.4, Red Cell Distribution Width 12.9, Platelet 

Count 365, Mean Platelet Volume 5.2L, Neutrophils (%) (Auto) 42.8L, Lymphocytes 

(%) (Auto) 42.2, Monocytes (%) (Auto) 12.1H, Eosinophils (%) (Auto) 1.9, 

Basophils (%) (Auto) 1.0, Sodium Level 137, Potassium Level 4.5, Chloride Level 

102, Carbon Dioxide Level 30, Anion Gap 5, Blood Urea Nitrogen 18, Creatinine 

1.1, Estimat Glomerular Filtration Rate > 60, Glucose Level 105, Calcium Level 

9.4


Height (Feet):  6


Height (Inches):  1.00


Weight (Pounds):  155


General Appearance:  no apparent distress


Objective


no change











Manuel Norton MD Jul 29, 2019 10:13

## 2019-07-29 NOTE — NUR
Social Service Note



SW left a message for  Lauren [phone] to address placement.



Referred to the following facilities:



[facility] for [facility] [phone] (p) [phone] (f)

Ellie at [facility] [phone] (p) declined based on insurance

Willis at [facility] [phone] (p) [phone] (f)

Preet at [place] [phone] (p) [phone] (f)

Iris at [place] [phone] (p) [phone] (f)

Yesinia at [facility] [phone] (p) [phone] (f)

Pe[facility] at [facility] [phone] (p) [phone] (f)

Estefania at [facility] [phone] (p) [phone] (f)

Yennifer at [place] [phone] (p)

China at [facility] [phone] (p) [phone] (f)

## 2019-07-29 NOTE — NUR
NURSE NOTES:

Received patient  in bed, patient  awake,alert.no sign of distress, HL patent, on fall 
precaution ,  discussed plan of care with pt, bed in lowest position call light within 
reach, bed in lowest position, left foot and leg dressing noted intact, elevate foot on 
pillow, kept clean and comfortable in bed, 



genevieve rea

## 2019-07-29 NOTE — PULMONOLOGY PROGRESS NOTE
Assessment/Plan


Problems:  


(1) Sepsis


(2) Cellulitis of left foot


Assessment/Plan


improving


doing better


on abx


check cultures


wound care


dvt prophylaxis. 


dc planning


waiting for placement





avoid IV narcotics





Subjective


ROS Limited/Unobtainable:  No


Constitutional:  Reports: no symptoms


HEENT:  Repors: no symptoms


Allergies:  


Coded Allergies:  


     No Known Allergies (Unverified , 7/13/19)





Objective





Last 24 Hour Vital Signs








  Date Time  Temp Pulse Resp B/P (MAP) Pulse Ox O2 Delivery O2 Flow Rate FiO2


 


7/29/19 08:16      Room Air  


 


7/29/19 08:10 98.0 73 18 128/75 (92) 98   


 


7/29/19 04:00 97.5 70 18 113/76 (88) 100   


 


7/29/19 00:00 97.9 71 18 122/72 (89) 98   


 


7/28/19 21:00      Room Air  


 


7/28/19 20:00 97.9 84 20 132/69 (90) 96   


 


7/28/19 17:33 98.4       


 


7/28/19 16:43 98.4       


 


7/28/19 15:41 98.4 86 16 110/74 (86) 96   

















Intake and Output  


 


 7/28/19 7/29/19





 18:59 06:59


 


Output Total 1400 ml 1200 ml


 


Balance -1400 ml -1200 ml


 


  


 


Output Urine Total 1400 ml 1200 ml








Objective


General Appearance:  WD/WN


HEENT:  normocephalic, atraumatic


Respiratory/Chest:  chest wall non-tender, lungs clear, normal breath sounds


Breasts:  no masses


Cardiovascular:  normal peripheral pulses, normal rate


Abdomen:  normal bowel sounds, soft, non tender


Genitourinary:  normal external genitalia


Extremities:  no cyanosis, less edema


Laboratory Tests


7/29/19 05:20: 


White Blood Count 5.0, Red Blood Count 4.01L, Hemoglobin 12.1L, Hematocrit 37.4L

, Mean Corpuscular Volume 93, Mean Corpuscular Hemoglobin 30.2, Mean 

Corpuscular Hemoglobin Concent 32.4, Red Cell Distribution Width 12.9, Platelet 

Count 365, Mean Platelet Volume 5.2L, Neutrophils (%) (Auto) 42.8L, Lymphocytes 

(%) (Auto) 42.2, Monocytes (%) (Auto) 12.1H, Eosinophils (%) (Auto) 1.9, 

Basophils (%) (Auto) 1.0, Sodium Level 137, Potassium Level 4.5, Chloride Level 

102, Carbon Dioxide Level 30, Anion Gap 5, Blood Urea Nitrogen 18, Creatinine 

1.1, Estimat Glomerular Filtration Rate > 60, Glucose Level 105, Calcium Level 

9.4





Current Medications








 Medications


  (Trade)  Dose


 Ordered  Sig/Herlinda


 Route


 PRN Reason  Start Time


 Stop Time Status Last Admin


Dose Admin


 


 Acetaminophen


  (Tylenol)  650 mg  Q4H  PRN


 ORAL


 fever  7/14/19 08:15


 8/13/19 08:14  7/21/19 05:19


 


 


 Acetaminophen/


 Hydrocodone Bitart


  (Norco 10/325)  1 tab  Q4H  PRN


 ORAL


 For breakthrough pain  7/25/19 11:45


 8/1/19 11:44  7/29/19 08:33


 


 


 Artificial Tears


  (Lacri-Lube)  1 applic  QIDPRN  PRN


 BOTH EYES


 Dry Eyes  7/26/19 18:30


 8/25/19 18:14   


 


 


 Dextrose


  (Dextrose 50%)  25 ml  Q30M  PRN


 IV


 Hypoglycemia  7/14/19 08:30


 8/13/19 08:16   


 


 


 Dextrose


  (Dextrose 50%)  50 ml  Q30M  PRN


 IV


 hypoglycemia  7/14/19 08:30


 8/13/19 08:29   


 


 


 Docusate Sodium


  (Colace)  100 mg  THREE TIMES A  DAY


 ORAL


   7/14/19 18:00


 8/13/19 17:59  7/29/19 08:33


 


 


 Folic Acid


  (Folate)  3 mg  DAILY


 ORAL


   7/26/19 09:00


 8/25/19 08:59  7/29/19 08:33


 


 


 Heparin Sodium


  (Porcine)


  (Heparin 5000


 units/ml)  5,000 units  EVERY 12  HOURS


 SUBQ


   7/14/19 09:00


 8/13/19 08:59  7/29/19 08:36


 


 


 Hydrogen Peroxide


  (Hydrogen


 Peroxide)  1 applic  DAILY


 TOPIC


   7/26/19 17:15


 8/25/19 17:14  7/29/19 08:43


 


 


 Ibuprofen


  (Motrin)  600 mg  TID


 ORAL


   7/17/19 18:00


 8/16/19 17:59  7/29/19 08:32


 


 


 Nitroglycerin


  (Ntg)  0.4 mg  Q5M  PRN


 SL


 Prn Chest Pain  7/14/19 08:15


 8/13/19 08:14   


 


 


 Ondansetron HCl


  (Zofran)  4 mg  Q6H  PRN


 IVP


 Nausea & Vomiting  7/14/19 08:15


 8/13/19 08:14   


 


 


 Pantoprazole


  (Protonix)  40 mg  EVERY 12  HOURS


 ORAL


   7/14/19 21:00


 8/13/19 20:59  7/29/19 08:32


 


 


 Polyethylene


 Glycol


  (Miralax)  17 gm  DAILYPRN  PRN


 ORAL


 Constipation  7/14/19 08:15


 8/13/19 08:14  7/14/19 11:32


 


 


 Potassium Chloride


  (K-Dur)  40 meq  DAILY


 ORAL


   7/18/19 09:00


 8/17/19 08:59  7/29/19 08:33


 

















Pia Coe MD Jul 29, 2019 11:56

## 2019-07-29 NOTE — NUR
SI:       CELLULITIS

T. 97.9 HR 70 RR 18 B/P 105/64

RA 98%





IS:     PROTONIX PO

HEPARIN SUBC

K-DUR PO

PLACEMENT PENDING

******MED/SURG STATUS****

## 2019-07-29 NOTE — NUR
NURSE NOTES:

Pt is in bed, awake and verbal. No acute distress noted. Vitas stable. Left leg dressing is 
dry and intact. Pain medication will be given as ordered PRN. Bed locked low in 
position,side rails up and call light within reach.

## 2019-07-30 VITALS — SYSTOLIC BLOOD PRESSURE: 118 MMHG | DIASTOLIC BLOOD PRESSURE: 59 MMHG

## 2019-07-30 VITALS — SYSTOLIC BLOOD PRESSURE: 103 MMHG | DIASTOLIC BLOOD PRESSURE: 63 MMHG

## 2019-07-30 VITALS — DIASTOLIC BLOOD PRESSURE: 63 MMHG | SYSTOLIC BLOOD PRESSURE: 118 MMHG

## 2019-07-30 VITALS — DIASTOLIC BLOOD PRESSURE: 65 MMHG | SYSTOLIC BLOOD PRESSURE: 109 MMHG

## 2019-07-30 VITALS — SYSTOLIC BLOOD PRESSURE: 117 MMHG | DIASTOLIC BLOOD PRESSURE: 64 MMHG

## 2019-07-30 LAB
ADD MANUAL DIFF: NO
ANION GAP SERPL CALC-SCNC: 6 MMOL/L (ref 5–15)
BASOPHILS NFR BLD AUTO: 0.8 % (ref 0–2)
BUN SERPL-MCNC: 17 MG/DL (ref 7–18)
CALCIUM SERPL-MCNC: 9.8 MG/DL (ref 8.5–10.1)
CHLORIDE SERPL-SCNC: 102 MMOL/L (ref 98–107)
CO2 SERPL-SCNC: 30 MMOL/L (ref 21–32)
CREAT SERPL-MCNC: 0.9 MG/DL (ref 0.55–1.3)
EOSINOPHIL NFR BLD AUTO: 2 % (ref 0–3)
ERYTHROCYTE [DISTWIDTH] IN BLOOD BY AUTOMATED COUNT: 12.6 % (ref 11.6–14.8)
HCT VFR BLD CALC: 39 % (ref 42–52)
HGB BLD-MCNC: 12.6 G/DL (ref 14.2–18)
LYMPHOCYTES NFR BLD AUTO: 41.2 % (ref 20–45)
MCV RBC AUTO: 93 FL (ref 80–99)
MONOCYTES NFR BLD AUTO: 10.3 % (ref 1–10)
NEUTROPHILS NFR BLD AUTO: 45.7 % (ref 45–75)
PLATELET # BLD: 334 K/UL (ref 150–450)
POTASSIUM SERPL-SCNC: 4.3 MMOL/L (ref 3.5–5.1)
RBC # BLD AUTO: 4.19 M/UL (ref 4.7–6.1)
SODIUM SERPL-SCNC: 138 MMOL/L (ref 136–145)
WBC # BLD AUTO: 4.4 K/UL (ref 4.8–10.8)

## 2019-07-30 RX ADMIN — HYDROGEN PEROXIDE SCH APPLIC: 2.65 LIQUID TOPICAL at 08:21

## 2019-07-30 RX ADMIN — HYDROCODONE BITARTRATE AND ACETAMINOPHEN PRN TAB: 5; 325 TABLET ORAL at 21:02

## 2019-07-30 RX ADMIN — DOCUSATE SODIUM SCH MG: 100 CAPSULE, LIQUID FILLED ORAL at 17:02

## 2019-07-30 RX ADMIN — DOCUSATE SODIUM SCH MG: 100 CAPSULE, LIQUID FILLED ORAL at 08:17

## 2019-07-30 RX ADMIN — HYDROCODONE BITARTRATE AND ACETAMINOPHEN PRN TAB: 5; 325 TABLET ORAL at 12:19

## 2019-07-30 RX ADMIN — DOCUSATE SODIUM SCH MG: 100 CAPSULE, LIQUID FILLED ORAL at 12:19

## 2019-07-30 RX ADMIN — HYDROCODONE BITARTRATE AND ACETAMINOPHEN PRN TAB: 10; 325 TABLET ORAL at 02:22

## 2019-07-30 RX ADMIN — HEPARIN SODIUM SCH UNITS: 5000 INJECTION INTRAVENOUS; SUBCUTANEOUS at 08:21

## 2019-07-30 RX ADMIN — HYDROCODONE BITARTRATE AND ACETAMINOPHEN PRN TAB: 10; 325 TABLET ORAL at 06:31

## 2019-07-30 RX ADMIN — HYDROCODONE BITARTRATE AND ACETAMINOPHEN PRN TAB: 5; 325 TABLET ORAL at 17:02

## 2019-07-30 RX ADMIN — HEPARIN SODIUM SCH UNITS: 5000 INJECTION INTRAVENOUS; SUBCUTANEOUS at 21:09

## 2019-07-30 NOTE — PULMONOLOGY PROGRESS NOTE
Assessment/Plan


Problems:  


(1) Sepsis


(2) Cellulitis of left foot


Assessment/Plan


improving


doing better


on abx


check cultures


wound care


dvt prophylaxis. 


dc planning


waiting for placement





avoid IV narcotics





Subjective


ROS Limited/Unobtainable:  No


Constitutional:  Reports: no symptoms


HEENT:  Repors: no symptoms


Respiratory:  Reports: no symptoms


Allergies:  


Coded Allergies:  


     No Known Allergies (Unverified , 7/13/19)





Objective





Last 24 Hour Vital Signs








  Date Time  Temp Pulse Resp B/P (MAP) Pulse Ox O2 Delivery O2 Flow Rate FiO2


 


7/30/19 08:15      Room Air  


 


7/30/19 08:00 97.9 83 18 118/63 (81) 94   


 


7/30/19 00:00 97.7 75 18 103/63 (76) 97   


 


7/29/19 21:00      Room Air  


 


7/29/19 20:00 97.9 79 18 94/55 (68) 97   


 


7/29/19 16:00 97.5 71 18 93/60 (71) 100   


 


7/29/19 12:05 97.9 70 18 105/64 (78) 98   

















Intake and Output  


 


 7/29/19 7/30/19





 19:00 07:00


 


Intake Total 820 ml 800 ml


 


Output Total  1200 ml


 


Balance 820 ml -400 ml


 


  


 


Intake Oral 820 ml 800 ml


 


Output Urine Total  1200 ml


 


# Voids 7 4








Objective


General Appearance:  WD/WN


HEENT:  normocephalic, atraumatic


Respiratory/Chest:  chest wall non-tender, lungs clear, normal breath sounds


Breasts:  no masses


Cardiovascular:  normal peripheral pulses, normal rate


Abdomen:  normal bowel sounds, soft, non tender


Genitourinary:  normal external genitalia


Extremities:  no cyanosis, less edema


Laboratory Tests


7/30/19 05:34: 


White Blood Count 4.4L, Red Blood Count 4.19L, Hemoglobin 12.6L, Hematocrit 

39.0L, Mean Corpuscular Volume 93, Mean Corpuscular Hemoglobin 30.0, Mean 

Corpuscular Hemoglobin Concent 32.2, Red Cell Distribution Width 12.6, Platelet 

Count 334, Mean Platelet Volume 5.4L, Neutrophils (%) (Auto) 45.7, Lymphocytes (

%) (Auto) 41.2, Monocytes (%) (Auto) 10.3H, Eosinophils (%) (Auto) 2.0, 

Basophils (%) (Auto) 0.8, Sodium Level 138, Potassium Level 4.3, Chloride Level 

102, Carbon Dioxide Level 30, Anion Gap 6, Blood Urea Nitrogen 17, Creatinine 

0.9, Estimat Glomerular Filtration Rate > 60, Glucose Level 102, Calcium Level 

9.8, Hepatitis A IgM Antibody [Pending], Hepatitis B Surface Antigen [Pending], 

Hepatitis B Core IgM Antibody [Pending], Hepatitis C Antibody [Pending], HIV (1&

2) Antibody Rapid [Pending]





Current Medications








 Medications


  (Trade)  Dose


 Ordered  Sig/Herlinda


 Route


 PRN Reason  Start Time


 Stop Time Status Last Admin


Dose Admin


 


 Acetaminophen


  (Tylenol)  650 mg  Q4H  PRN


 ORAL


 fever  7/14/19 08:15


 8/13/19 08:14  7/21/19 05:19


 


 


 Acetaminophen/


 Hydrocodone Bitart


  (Norco 10/325)  1 tab  Q4H  PRN


 ORAL


 For breakthrough pain  7/25/19 11:45


 8/1/19 11:44  7/30/19 06:31


 


 


 Artificial Tears


  (Lacri-Lube)  1 applic  QIDPRN  PRN


 BOTH EYES


 Dry Eyes  7/26/19 18:30


 8/25/19 18:14   


 


 


 Dextrose


  (Dextrose 50%)  25 ml  Q30M  PRN


 IV


 Hypoglycemia  7/14/19 08:30


 8/13/19 08:16   


 


 


 Dextrose


  (Dextrose 50%)  50 ml  Q30M  PRN


 IV


 hypoglycemia  7/14/19 08:30


 8/13/19 08:29   


 


 


 Docusate Sodium


  (Colace)  100 mg  THREE TIMES A  DAY


 ORAL


   7/14/19 18:00


 8/13/19 17:59  7/30/19 08:17


 


 


 Folic Acid


  (Folate)  3 mg  DAILY


 ORAL


   7/26/19 09:00


 8/25/19 08:59  7/30/19 08:17


 


 


 Heparin Sodium


  (Porcine)


  (Heparin 5000


 units/ml)  5,000 units  EVERY 12  HOURS


 SUBQ


   7/14/19 09:00


 8/13/19 08:59  7/30/19 08:21


 


 


 Hydrogen Peroxide


  (Hydrogen


 Peroxide)  1 applic  DAILY


 TOPIC


   7/26/19 17:15


 8/25/19 17:14  7/30/19 08:21


 


 


 Ibuprofen


  (Motrin)  600 mg  TID


 ORAL


   7/17/19 18:00


 8/16/19 17:59  7/30/19 08:16


 


 


 Nitroglycerin


  (Ntg)  0.4 mg  Q5M  PRN


 SL


 Prn Chest Pain  7/14/19 08:15


 8/13/19 08:14   


 


 


 Ondansetron HCl


  (Zofran)  4 mg  Q6H  PRN


 IVP


 Nausea & Vomiting  7/14/19 08:15


 8/13/19 08:14   


 


 


 Pantoprazole


  (Protonix)  40 mg  EVERY 12  HOURS


 ORAL


   7/14/19 21:00


 8/13/19 20:59  7/30/19 08:17


 


 


 Polyethylene


 Glycol


  (Miralax)  17 gm  DAILYPRN  PRN


 ORAL


 Constipation  7/14/19 08:15


 8/13/19 08:14  7/14/19 11:32


 


 


 Potassium Chloride


  (K-Dur)  40 meq  DAILY


 ORAL


   7/18/19 09:00


 8/17/19 08:59  7/30/19 08:17


 

















Pia Coe MD Jul 30, 2019 10:59

## 2019-07-30 NOTE — HEMATOLOGY/ONC PROGRESS NOTE
Assessment/Plan


Assessment/Plan


Assessment/Recs:


# Leukopenia with a decreased wbc 5-->4


--> obtain hepatitis and hiv lab draw


--> imaging of the abd on prn basis


--> neupogen for anc goal >1500


# Sepsis with cellulitis of left foot


--> on antibiotics


--> stable, progressing


--> seen by id and currently is off antibiotics


--> slowly improving


# Hypokalemia 


--> KCl repleted


# Hyponatremia


--> ivf as needed


--> per renal


# IVDU


--> recommend cessation





Appreciate consultant recs





Subjective


HEENT:  Denies: no symptoms, eye pain, blurred vision, tearing, double vision, 

ear pain, ear discharge, nose pain, nose congestion, throat pain, throat 

swelling, mouth pain, mouth swelling, other


Cardiovascular:  Denies: no symptoms, chest pain, edema, irregular heart rate, 

lightheadedness, palpitations, syncope, other


Respiratory:  Denies: no symptoms, cough, shortness of breath, SOB with 

excertion, SOB at rest, sputum, wheezing, other


Gastrointestinal/Abdominal:  Denies: no symptoms, abdomen distended, abdominal 

pain, black stools, tarry stools, blood in stool, constipated, diarrhea, 

difficulty swallowing, nausea, poor appetite, poor fluid intake, rectal bleeding

, vomiting, other


Genitourinary:  Denies: no symptoms, burning, discharge, frequency, flank pain, 

hematuria, incontinence, pain, urgency, other


Neurologic/Psychiatric:  Denies: no symptoms, anxiety, depressed, emotional 

problems, headache, numbness, paresthesia, pre-existing deficit, seizure, 

tingling, tremors, weakness, other


Endocrine:  Denies: no symptoms, excessive sweating, flushing, intolerance to 

cold, intolerance to heat, increased hunger, increased thirst, increased urine, 

unexplained weight gain, unexplained weight loss, other


Hematologic/Lymphatic:  Denies: no symptoms, anemia, easy bleeding, easy 

bruising, adenopathy, other


Allergies:  


Coded Allergies:  


     No Known Allergies (Unverified , 7/13/19)


Subjective


7/25: left leg still draining clearish fluid, otherwise no f/c, no night sweats


7/26: no issues reported, no fevers or chills, labs reviewed, lying in bed, a+o 

x4


7/27: no bleeding, no f/c, no night sweats, left diabetic foot with dressing


7/28: Dressing on L foot intact and dry. asymptomatic, no events


7/29: no fevers or chills, on heparin sq, no bleeding noted


7/30: is off antibiotics, without fevers, no chills noted, wbc remains low





Objective


Objective





Current Medications








 Medications


  (Trade)  Dose


 Ordered  Sig/Herlinda


 Route


 PRN Reason  Start Time


 Stop Time Status Last Admin


Dose Admin


 


 Acetaminophen


  (Tylenol)  650 mg  Q4H  PRN


 ORAL


 fever  7/14/19 08:15


 8/13/19 08:14  7/21/19 05:19


 


 


 Acetaminophen/


 Hydrocodone Bitart


  (Norco 10/325)  1 tab  Q4H  PRN


 ORAL


 For breakthrough pain  7/25/19 11:45


 8/1/19 11:44  7/30/19 06:31


 


 


 Artificial Tears


  (Lacri-Lube)  1 applic  QIDPRN  PRN


 BOTH EYES


 Dry Eyes  7/26/19 18:30


 8/25/19 18:14   


 


 


 Dextrose


  (Dextrose 50%)  25 ml  Q30M  PRN


 IV


 Hypoglycemia  7/14/19 08:30


 8/13/19 08:16   


 


 


 Dextrose


  (Dextrose 50%)  50 ml  Q30M  PRN


 IV


 hypoglycemia  7/14/19 08:30


 8/13/19 08:29   


 


 


 Docusate Sodium


  (Colace)  100 mg  THREE TIMES A  DAY


 ORAL


   7/14/19 18:00


 8/13/19 17:59  7/30/19 08:17


 


 


 Folic Acid


  (Folate)  3 mg  DAILY


 ORAL


   7/26/19 09:00


 8/25/19 08:59  7/30/19 08:17


 


 


 Heparin Sodium


  (Porcine)


  (Heparin 5000


 units/ml)  5,000 units  EVERY 12  HOURS


 SUBQ


   7/14/19 09:00


 8/13/19 08:59  7/30/19 08:21


 


 


 Hydrogen Peroxide


  (Hydrogen


 Peroxide)  1 applic  DAILY


 TOPIC


   7/26/19 17:15


 8/25/19 17:14  7/30/19 08:21


 


 


 Ibuprofen


  (Motrin)  600 mg  TID


 ORAL


   7/17/19 18:00


 8/16/19 17:59  7/30/19 08:16


 


 


 Nitroglycerin


  (Ntg)  0.4 mg  Q5M  PRN


 SL


 Prn Chest Pain  7/14/19 08:15


 8/13/19 08:14   


 


 


 Ondansetron HCl


  (Zofran)  4 mg  Q6H  PRN


 IVP


 Nausea & Vomiting  7/14/19 08:15


 8/13/19 08:14   


 


 


 Pantoprazole


  (Protonix)  40 mg  EVERY 12  HOURS


 ORAL


   7/14/19 21:00


 8/13/19 20:59  7/30/19 08:17


 


 


 Polyethylene


 Glycol


  (Miralax)  17 gm  DAILYPRN  PRN


 ORAL


 Constipation  7/14/19 08:15


 8/13/19 08:14  7/14/19 11:32


 


 


 Potassium Chloride


  (K-Dur)  40 meq  DAILY


 ORAL


   7/18/19 09:00


 8/17/19 08:59  7/30/19 08:17


 











Last 24 Hour Vital Signs








  Date Time  Temp Pulse Resp B/P (MAP) Pulse Ox O2 Delivery O2 Flow Rate FiO2


 


7/30/19 08:15      Room Air  


 


7/30/19 08:00 97.9 83 18 118/63 (81) 94   


 


7/30/19 00:00 97.7 75 18 103/63 (76) 97   


 


7/29/19 21:00      Room Air  


 


7/29/19 20:00 97.9 79 18 94/55 (68) 97   


 


7/29/19 16:00 97.5 71 18 93/60 (71) 100   


 


7/29/19 12:05 97.9 70 18 105/64 (78) 98   


 


7/29/19 08:16      Room Air  


 


7/29/19 08:10 98.0 73 18 128/75 (92) 98   


 


7/29/19 04:00 97.5 70 18 113/76 (88) 100   


 


7/29/19 00:00 97.9 71 18 122/72 (89) 98   


 


7/28/19 21:00      Room Air  


 


7/28/19 20:00 97.9 84 20 132/69 (90) 96   


 


7/28/19 17:33 98.4       


 


7/28/19 16:43 98.4       


 


7/28/19 15:41 98.4 86 16 110/74 (86) 96   


 


7/28/19 11:37 97.7 86 18 129/87 (101) 99   

















Intake and Output  


 


 7/29/19 7/30/19





 19:00 07:00


 


Intake Total 820 ml 800 ml


 


Output Total  1200 ml


 


Balance 820 ml -400 ml


 


  


 


Intake Oral 820 ml 800 ml


 


Output Urine Total  1200 ml


 


# Voids 7 4











Labs








Test


  7/28/19


05:30 7/29/19


05:20 7/30/19


05:34


 


White Blood Count


  4.7 K/UL


(4.8-10.8) 5.0 K/UL


(4.8-10.8) 4.4 K/UL


(4.8-10.8)


 


Red Blood Count


  4.10 M/UL


(4.70-6.10) 4.01 M/UL


(4.70-6.10) 4.19 M/UL


(4.70-6.10)


 


Hemoglobin


  12.3 G/DL


(14.2-18.0) 12.1 G/DL


(14.2-18.0) 12.6 G/DL


(14.2-18.0)


 


Hematocrit


  38.2 %


(42.0-52.0) 37.4 %


(42.0-52.0) 39.0 %


(42.0-52.0)


 


Mean Corpuscular Volume 93 FL (80-99)  93 FL (80-99)  93 FL (80-99) 


 


Mean Corpuscular Hemoglobin


  30.1 PG


(27.0-31.0) 30.2 PG


(27.0-31.0) 30.0 PG


(27.0-31.0)


 


Mean Corpuscular Hemoglobin


Concent 32.3 G/DL


(32.0-36.0) 32.4 G/DL


(32.0-36.0) 32.2 G/DL


(32.0-36.0)


 


Red Cell Distribution Width


  12.5 %


(11.6-14.8) 12.9 %


(11.6-14.8) 12.6 %


(11.6-14.8)


 


Platelet Count


  400 K/UL


(150-450) 365 K/UL


(150-450) 334 K/UL


(150-450)


 


Mean Platelet Volume


  5.0 FL


(6.5-10.1) 5.2 FL


(6.5-10.1) 5.4 FL


(6.5-10.1)


 


Neutrophils (%) (Auto)


  49.3 %


(45.0-75.0) 42.8 %


(45.0-75.0) 45.7 %


(45.0-75.0)


 


Lymphocytes (%) (Auto)


  33.8 %


(20.0-45.0) 42.2 %


(20.0-45.0) 41.2 %


(20.0-45.0)


 


Monocytes (%) (Auto)


  14.5 %


(1.0-10.0) 12.1 %


(1.0-10.0) 10.3 %


(1.0-10.0)


 


Eosinophils (%) (Auto)


  1.4 %


(0.0-3.0) 1.9 %


(0.0-3.0) 2.0 %


(0.0-3.0)


 


Basophils (%) (Auto)


  1.0 %


(0.0-2.0) 1.0 %


(0.0-2.0) 0.8 %


(0.0-2.0)


 


Sodium Level


  135 MMOL/L


(136-145) 137 MMOL/L


(136-145) 138 MMOL/L


(136-145)


 


Potassium Level


  4.2 MMOL/L


(3.5-5.1) 4.5 MMOL/L


(3.5-5.1) 4.3 MMOL/L


(3.5-5.1)


 


Chloride Level


  101 MMOL/L


() 102 MMOL/L


() 102 MMOL/L


()


 


Carbon Dioxide Level


  27 MMOL/L


(21-32) 30 MMOL/L


(21-32) 30 MMOL/L


(21-32)


 


Anion Gap


  7 mmol/L


(5-15) 5 mmol/L


(5-15) 6 mmol/L


(5-15)


 


Blood Urea Nitrogen


  14 mg/dL


(7-18) 18 mg/dL


(7-18) 17 mg/dL


(7-18)


 


Creatinine


  1.0 MG/DL


(0.55-1.30) 1.1 MG/DL


(0.55-1.30) 0.9 MG/DL


(0.55-1.30)


 


Estimat Glomerular Filtration


Rate > 60 mL/min


(>60) > 60 mL/min


(>60) > 60 mL/min


(>60)


 


Glucose Level


  91 MG/DL


() 105 MG/DL


() 102 MG/DL


()


 


Calcium Level


  9.9 MG/DL


(8.5-10.1) 9.4 MG/DL


(8.5-10.1) 9.8 MG/DL


(8.5-10.1)








Height (Feet):  6


Height (Inches):  1.00


Weight (Pounds):  155


Objective


HEENT: normal ENT inspection, normal alignment


CV:  normal peripheral pulses, normal rate, regular rhythm


Resp: chest wall non-tender, lungs clear, normal breath sounds


Abdomen: normal bowel sounds, non tender, soft


Extremities: normal inspection


Edema: trace edema


Neurologic: motor weakness


Skin:  normal pigmentation, warm/dry, ++l foor sl red and swollen up to ankle











Jeffrey Vasquez MD Jul 30, 2019 10:23

## 2019-07-30 NOTE — NUR
NURSE NOTES:

Patient awake in bed, resting, not in respiratory distress. Waiting for ambulance . 
Will follow up accordingly. Call light and needs in reach. Bed in lowest position and lock 
engaged.

## 2019-07-30 NOTE — NUR
NOTES







RECEIVED A CALL FROM [employer] FROM INSURANCE, REQUESTED TO SEND REFERRAL TO [facility] 
TO JEWEL HODGE [phone]. WILL FOLLOW UP WITH PLACEMENT.

## 2019-07-30 NOTE — INFECTIOUS DISEASES PROG NOTE
Assessment/Plan


Assessment/Plan


A:








58 yo male who presnted to the ED on 7/12/19 with left leg cellulitis.





Left foot cellulitis; improving


    -wound cx: MRSA, GAS, P.  mirabilsi (Gonzalez S), C. freundi (R ancef, otherwise 

S), K. oxytoca (R amp, otherwise S)


    Leukocytosis ;SP


    Low grade fevers; SP


       -Tibia/fibula MRI: Negative for evidence of osteomyelitis. Evidence of 

superficial soft tissue ulcer, marked by a marker at the anteromedial shin 

region.Considerable subcutaneous fat edema. Given stated clinical history, 

probably on the basis of cellulitis. Correlate with clinical findings. No 

evidence of drainable abscess.


      -foot MRI:  Extensive soft tissue edema, as described. This is in keeping 

with stated clinical history of cellulitis. No findings to suggest abscess Very 

superficial fluid collections likely relate to stated clinical history of 

dorsal blisters. No marrow abnormality to suggest acute osteomyelitis 

demonstrated.


      -ANkle MRI:  Patchy areas of marrow edema, as described. Periarticular 

distribution of this is suggestive of arthropathy which may be degenerative, 

inflammatory, or, most likely, on the basis of Charcot-type changes. Per 

technologist, there are no ulcers in the area so osteomyelitis is deemed much 

less likely. Extensive edema of the subcutaneous fat. Given stated clinical 

history of cellulitis most likely on the basis of such. Correlate with clinical 

findings. No drainable


abscess collection demonstrated.


 


        -Foot xray: No acute injury identified. Soft tissue swelling. Multiple 

incidental findings as described











PLAN:





 - Continue to monitor off abx


     -7/28 SP Bactrim and Augmentin #6


     -7/21 SP Cefepime #8, IV Vancomycin #8





 - Monitor CBC and Temps


 -Podiatry f/u





Subjective


Allergies:  


Coded Allergies:  


     No Known Allergies (Unverified , 7/13/19)


Subjective


afebrile


no leukocytosis 


awaiting placement


now off abx





Objective


Vital Signs





Last 24 Hour Vital Signs








  Date Time  Temp Pulse Resp B/P (MAP) Pulse Ox O2 Delivery O2 Flow Rate FiO2


 


7/30/19 08:15      Room Air  


 


7/30/19 08:00 97.9 83 18 118/63 (81) 94   


 


7/30/19 00:00 97.7 75 18 103/63 (76) 97   


 


7/29/19 21:00      Room Air  


 


7/29/19 20:00 97.9 79 18 94/55 (68) 97   


 


7/29/19 16:00 97.5 71 18 93/60 (71) 100   


 


7/29/19 12:05 97.9 70 18 105/64 (78) 98   








Height (Feet):  6


Height (Inches):  1.00


Weight (Pounds):  155


Objective


Gen:  NAD


HEENT: NCAT, MMM, EOMI, PERRL, No Oral lesion, no scleral icterus 


NECK:  full range of motion, supple, no meningismus, No LAD, No JVD


LUNGS:  CTAB, No W/C, No Accessory muscle use


CARDS: RRR, S1, S2, No M/R/G, 


ABD: Soft, NT, ND, No R/G, + BS, No HSM, No Masses


: Deferred


Ext: C/C/E, Pulses 2+ B/L (DP, Rad): LLE swelling with erythema and pain, 

Ulceration present with purulence.


NEURO: A/O x 4, Strength and Sensation Grossly intact


PSYCH: Normal mood and affect


SKIN:  Warm/dry, No rashes





Laboratory Tests








Test


  7/30/19


05:34


 


White Blood Count


  4.4 K/UL


(4.8-10.8)  L


 


Red Blood Count


  4.19 M/UL


(4.70-6.10)  L


 


Hemoglobin


  12.6 G/DL


(14.2-18.0)  L


 


Hematocrit


  39.0 %


(42.0-52.0)  L


 


Mean Corpuscular Volume 93 FL (80-99)  


 


Mean Corpuscular Hemoglobin


  30.0 PG


(27.0-31.0)


 


Mean Corpuscular Hemoglobin


Concent 32.2 G/DL


(32.0-36.0)


 


Red Cell Distribution Width


  12.6 %


(11.6-14.8)


 


Platelet Count


  334 K/UL


(150-450)


 


Mean Platelet Volume


  5.4 FL


(6.5-10.1)  L


 


Neutrophils (%) (Auto)


  45.7 %


(45.0-75.0)


 


Lymphocytes (%) (Auto)


  41.2 %


(20.0-45.0)


 


Monocytes (%) (Auto)


  10.3 %


(1.0-10.0)  H


 


Eosinophils (%) (Auto)


  2.0 %


(0.0-3.0)


 


Basophils (%) (Auto)


  0.8 %


(0.0-2.0)


 


Sodium Level


  138 MMOL/L


(136-145)


 


Potassium Level


  4.3 MMOL/L


(3.5-5.1)


 


Chloride Level


  102 MMOL/L


()


 


Carbon Dioxide Level


  30 MMOL/L


(21-32)


 


Anion Gap


  6 mmol/L


(5-15)


 


Blood Urea Nitrogen


  17 mg/dL


(7-18)


 


Creatinine


  0.9 MG/DL


(0.55-1.30)


 


Estimat Glomerular Filtration


Rate > 60 mL/min


(>60)


 


Glucose Level


  102 MG/DL


()


 


Calcium Level


  9.8 MG/DL


(8.5-10.1)


 


Hepatitis A IgM Antibody Pending  


 


Hepatitis B Surface Antigen Pending  


 


Hepatitis B Core IgM Antibody Pending  


 


Hepatitis C Antibody Pending  


 


HIV (1&2) Antibody Rapid


  Negative


(NEGATIVE)











Current Medications








 Medications


  (Trade)  Dose


 Ordered  Sig/Herlinda


 Route


 PRN Reason  Start Time


 Stop Time Status Last Admin


Dose Admin


 


 Acetaminophen


  (Tylenol)  650 mg  Q4H  PRN


 ORAL


 fever  7/14/19 08:15


 8/13/19 08:14  7/21/19 05:19


 


 


 Acetaminophen/


 Hydrocodone Bitart


  (Norco 5/325)  1 tab  Q4H  PRN


 ORAL


 Moderate Pain (Pain Scale 4-6)  7/30/19 11:00


 8/6/19 10:59   


 


 


 Artificial Tears


  (Lacri-Lube)  1 applic  QIDPRN  PRN


 BOTH EYES


 Dry Eyes  7/26/19 18:30


 8/25/19 18:14   


 


 


 Dextrose


  (Dextrose 50%)  25 ml  Q30M  PRN


 IV


 Hypoglycemia  7/14/19 08:30


 8/13/19 08:16   


 


 


 Dextrose


  (Dextrose 50%)  50 ml  Q30M  PRN


 IV


 hypoglycemia  7/14/19 08:30


 8/13/19 08:29   


 


 


 Docusate Sodium


  (Colace)  100 mg  THREE TIMES A  DAY


 ORAL


   7/14/19 18:00


 8/13/19 17:59  7/30/19 08:17


 


 


 Folic Acid


  (Folate)  3 mg  DAILY


 ORAL


   7/26/19 09:00


 8/25/19 08:59  7/30/19 08:17


 


 


 Heparin Sodium


  (Porcine)


  (Heparin 5000


 units/ml)  5,000 units  EVERY 12  HOURS


 SUBQ


   7/14/19 09:00


 8/13/19 08:59  7/30/19 08:21


 


 


 Hydrogen Peroxide


  (Hydrogen


 Peroxide)  1 applic  DAILY


 TOPIC


   7/26/19 17:15


 8/25/19 17:14  7/30/19 08:21


 


 


 Ibuprofen


  (Motrin)  600 mg  TID


 ORAL


   7/17/19 18:00


 8/16/19 17:59  7/30/19 08:16


 


 


 Nitroglycerin


  (Ntg)  0.4 mg  Q5M  PRN


 SL


 Prn Chest Pain  7/14/19 08:15


 8/13/19 08:14   


 


 


 Ondansetron HCl


  (Zofran)  4 mg  Q6H  PRN


 IVP


 Nausea & Vomiting  7/14/19 08:15


 8/13/19 08:14   


 


 


 Pantoprazole


  (Protonix)  40 mg  EVERY 12  HOURS


 ORAL


   7/14/19 21:00


 8/13/19 20:59  7/30/19 08:17


 


 


 Polyethylene


 Glycol


  (Miralax)  17 gm  DAILYPRN  PRN


 ORAL


 Constipation  7/14/19 08:15


 8/13/19 08:14  7/14/19 11:32


 


 


 Potassium Chloride


  (K-Dur)  40 meq  DAILY


 ORAL


   7/18/19 09:00


 8/17/19 08:59  7/30/19 08:17


 

















Sarah Sinclair M.D. Jul 30, 2019 11:05

## 2019-07-30 NOTE — NUR
Social Service Note



SW spoke with Lauren insurance  [phone].  Lauren will discuss with [facility] to obtain approval for BRISEIDA with DAYSI Tate.  Lauren provided a list of 
contracted providers to also utilize.



Faxed referral to the following providers:

[facility] [phone] (p) [phone] (f)

[facility]ospitals [phone] (p) [phone] (f)

[facility] [phone] (p) [phone] (f)

[place] [phone] (p) [phone] (f)

[facility] [phone] (p) [phone] (f)

[facility] [phone] (p) [phone] (f)

[facility] [phone] (p) [phone] (f)

[facility] [phone] (p) [phone] 9f)

[facility] [phone] (p) [phone] (f)

[facility] [phone] (p) [phone] (f)

## 2019-07-30 NOTE — NUR
NURSE NOTES:

Still waiting for . Called [facility] ambulance again to follow up , per agent, 
in 30mins. Charge nurse made aware.

## 2019-07-30 NOTE — NUR
Social Service Note



BRISEIDA completed with DAYSI Tate.  Patient will received skilled services.  Patient accepted 
to room 11A.  Nurse to call report prior to transfer [phone].  [facility] ambulance [phone] 
[phone].

## 2019-07-30 NOTE — NUR
HAND-OFF: 

Report given to IMER Garcia, resting comfortably in bed, patient free from pain and 
injury,still waiting for ambulance /, endorse accordingly



imer rea.

## 2019-07-30 NOTE — GENERAL PROGRESS NOTE
Assessment/Plan


Problem List:  


(1) Sepsis


ICD Codes:  A41.9 - Sepsis, unspecified organism


SNOMED:  34219164


(2) Cellulitis of left foot


ICD Codes:  L03.116 - Cellulitis of left lower limb


SNOMED:  072280516


Status:  stable, progressing


Assessment/Plan:


wound care abx pain control cbc bmp am  dc snf if clear





Subjective


Constitutional:  Reports: weakness


Allergies:  


Coded Allergies:  


     No Known Allergies (Unverified , 7/13/19)


All Systems:  reviewed and negative except above


Subjective


sleepy calm





Objective





Last 24 Hour Vital Signs








  Date Time  Temp Pulse Resp B/P (MAP) Pulse Ox O2 Delivery O2 Flow Rate FiO2


 


7/30/19 11:54 97.7 81 18 118/59 (78) 96   


 


7/30/19 08:15      Room Air  


 


7/30/19 08:00 97.9 83 18 118/63 (81) 94   


 


7/30/19 00:00 97.7 75 18 103/63 (76) 97   


 


7/29/19 21:00      Room Air  


 


7/29/19 20:00 97.9 79 18 94/55 (68) 97   


 


7/29/19 16:00 97.5 71 18 93/60 (71) 100   

















Intake and Output  


 


 7/29/19 7/30/19





 19:00 07:00


 


Intake Total 820 ml 800 ml


 


Output Total  1200 ml


 


Balance 820 ml -400 ml


 


  


 


Intake Oral 820 ml 800 ml


 


Output Urine Total  1200 ml


 


# Voids 7 4








Laboratory Tests


7/30/19 05:34: 


White Blood Count 4.4L, Red Blood Count 4.19L, Hemoglobin 12.6L, Hematocrit 

39.0L, Mean Corpuscular Volume 93, Mean Corpuscular Hemoglobin 30.0, Mean 

Corpuscular Hemoglobin Concent 32.2, Red Cell Distribution Width 12.6, Platelet 

Count 334, Mean Platelet Volume 5.4L, Neutrophils (%) (Auto) 45.7, Lymphocytes (

%) (Auto) 41.2, Monocytes (%) (Auto) 10.3H, Eosinophils (%) (Auto) 2.0, 

Basophils (%) (Auto) 0.8, Sodium Level 138, Potassium Level 4.3, Chloride Level 

102, Carbon Dioxide Level 30, Anion Gap 6, Blood Urea Nitrogen 17, Creatinine 

0.9, Estimat Glomerular Filtration Rate > 60, Glucose Level 102, Calcium Level 

9.8, Hepatitis A IgM Antibody [Pending], Hepatitis B Surface Antigen [Pending], 

Hepatitis B Core IgM Antibody [Pending], Hepatitis C Antibody [Pending], HIV (1&

2) Antibody Rapid Negative


Height (Feet):  6


Height (Inches):  1.00


Weight (Pounds):  155


General Appearance:  lethargic


EENT:  normal ENT inspection


Neck:  normal alignment


Cardiovascular:  normal peripheral pulses, normal rate, regular rhythm


Respiratory/Chest:  chest wall non-tender, lungs clear, normal breath sounds


Abdomen:  normal bowel sounds, non tender, soft


Extremities:  normal inspection


Edema:  trace edema


Neurologic:  motor weakness


Skin:  normal pigmentation, warm/dry


Objective


l foor sl red and swollen up to ankle











Freddy Muñiz DO Jul 30, 2019 13:11

## 2019-07-30 NOTE — NUR
SI:     LEFT FOOT CELLULITIS

T. 97.7 HR 81 RR 18 B/P 118/59

WBC 4.4







IS:     HEPARIN SUBC

PROTONIX

PLACEMENT PENDING

**********MED/SURG STATUS******
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
## 2019-07-30 NOTE — NUR
NURSE NOTES:

[facility] ambulance is picking up the patient at this time. Belongings checked. Patient has 
no IV access.

## 2019-07-30 NOTE — NEPHROLOGY PROGRESS NOTE
Assessment/Plan


Problem List:  


(1) Cellulitis of left foot


(2) Sepsis


(3) Hyponatremia


(4) Hypoalbuminemia


Assessment


Left foot cellulitis / Leukocytosis  / No fever


Low Na


Low K


Anemia


High Lactic


Low Albumin


Plan











Isotonic solution


K supplement


DC IV fluids


monitor lytes


avoid nephrotoxics


urine tox screen positive for Amphetamines and Opiates


per orders


stable from renal stand if discharged





Subjective


ROS Limited/Unobtainable:  No


Constitutional:  Reports: malaise





Objective


Objective





Last 24 Hour Vital Signs








  Date Time  Temp Pulse Resp B/P (MAP) Pulse Ox O2 Delivery O2 Flow Rate FiO2


 


7/30/19 11:54 97.7 81 18 118/59 (78) 96   


 


7/30/19 08:15      Room Air  


 


7/30/19 08:00 97.9 83 18 118/63 (81) 94   


 


7/30/19 00:00 97.7 75 18 103/63 (76) 97   


 


7/29/19 21:00      Room Air  


 


7/29/19 20:00 97.9 79 18 94/55 (68) 97   

















Intake and Output  


 


 7/29/19 7/30/19





 19:00 07:00


 


Intake Total 820 ml 800 ml


 


Output Total  1200 ml


 


Balance 820 ml -400 ml


 


  


 


Intake Oral 820 ml 800 ml


 


Output Urine Total  1200 ml


 


# Voids 7 4








Laboratory Tests


7/30/19 05:34: 


White Blood Count 4.4L, Red Blood Count 4.19L, Hemoglobin 12.6L, Hematocrit 

39.0L, Mean Corpuscular Volume 93, Mean Corpuscular Hemoglobin 30.0, Mean 

Corpuscular Hemoglobin Concent 32.2, Red Cell Distribution Width 12.6, Platelet 

Count 334, Mean Platelet Volume 5.4L, Neutrophils (%) (Auto) 45.7, Lymphocytes (

%) (Auto) 41.2, Monocytes (%) (Auto) 10.3H, Eosinophils (%) (Auto) 2.0, 

Basophils (%) (Auto) 0.8, Sodium Level 138, Potassium Level 4.3, Chloride Level 

102, Carbon Dioxide Level 30, Anion Gap 6, Blood Urea Nitrogen 17, Creatinine 

0.9, Estimat Glomerular Filtration Rate > 60, Glucose Level 102, Calcium Level 

9.8, Hepatitis A IgM Antibody [Pending], Hepatitis B Surface Antigen [Pending], 

Hepatitis B Core IgM Antibody [Pending], Hepatitis C Antibody [Pending], HIV (1&

2) Antibody Rapid Negative


Height (Feet):  6


Height (Inches):  1.00


Weight (Pounds):  155


General Appearance:  other - leg pain +


Objective


no change











Manuel Norton MD Jul 30, 2019 16:32

## 2019-07-30 NOTE — NUR
PTA Co-signature Notes:

Reviewed patient's file. Reviewed and approved PTA notes

-------------------------------------------------------------------------------

Addendum: 07/30/19 at 1100 by ABENA PRITCHETT PT,MG

-------------------------------------------------------------------------------

Amended: Links added.

## 2019-07-30 NOTE — NUR
NURSE NOTES:

Received patient  in bed, patient  awake,alert x4,no sign of distress, HL patent, on fall 
precaution ,  discussed plan of care with pt, bed in lowest position call light within 
reach, bed in lowest position, left foot and leg dressing noted intact, elevate foot on 
pillow, kept clean and comfortable in bed, 



genevieve rea

## 2019-07-30 NOTE — NUR
nurse notes

discharge to [facility] obtained, patient agreed with the plan of care, 
report given to Quiana WILLAMS accordingly



genevieve rea

## 2019-07-31 NOTE — DISCHARGE SUMMARY
Discharge Summary


Discharge Summary


_


DATE OF ADMISSION: 7/13/2019





DATE OF DISCHARGE: 7/30/2019





DISCHARGED BY: Dr. Muñiz 





REASON FOR ADMISSION: 


89 years old male, homeless, without major medical problem, presented to 

emergency department complaining of swelling and redness of his left food.  


Laboratory work-up revealed significant leukocytosis WBC 18.8, lactic acid 2.9.

  CRP 61.  ESR 66.


Sodium 131, potassium 3.1.


EKG revealed sinus tachycardia with no acute ischemic changes.


Chest x-ray demonstrated no acute cardiopulmonary pathology.  


Urinalysis revealed no evidence of urinary tract infection.  


Urine toxicology screen was positive for amphetamine


Patient was diagnosed with   sepsis, cellulitis, and admitted for further 

management.


 


CONSULTANTS:


pulmonary/critical care Dr. Coe 


ID specialist Dr. Sinclair


nephrologist Dr. Norton


hematologist/oncologist Dr. Vasquez


podiatrist  








[facility] COURSE: 


Patient admitted to the hospital and started on empiric antibiotics as per ID 

specialist recommendation.


Blood cultures were negative.  


Wound culture revealed Klebsiella, Citrobacter, Proteus, streptococci group A 

and MRSA.  


Pain management was addressed.  


DVT prophylaxis provided.  





X-ray of the left foot revealed no evidence of acute injury.


Left tibia-fibula MRI revealed no evidence of osteomyelitis.


Left foot MRI revealed extensive soft tissue edema, consistent with the 

clinical history of cellulitis.  No findings to suggest abscess.  No evidence 

of acute osteomyelitis.


Ankle MRI revealed arthropathy ,  degenerative, inflammatory, or, most likely, 

on the basis of Charcot-type changes. 


No ulcers.  Unlikely osteomyelitis. Extensive edema of the subcutaneous fat. 

Given stated clinical history of cellulitis,


most likely on the basis of such. Correlate with clinical findings. No 

drainable abscess collection .





Podiatrist followed.


Wound care provided as per podiatrist recommendation.


Nephrologist followed.  


Patient initially hydrated with IV fluids.


Renal parameters and electrolytes were closely monitored.


Electrolytes corrected as needed.


Nephrotoxic's were avoided. 


Supportive care provided.  


Hemoglobin A1c 6.4.  Blood sugar was closely [id number].


Patient was counseled to avoid concentrated sweets.





Venous duplex bilateral lower extremity revealed no evidence of acute DVT.


DVT prophylaxis provided. 


Patient completed course of antibiotics.


Leukocytosis resolved, no fevers


Infectious disease specialist recommended to keep patient off antibiotics.


Echocardiogram revealed ejection fraction of 55% with mild left ventricular 

hypertrophy.  No evidence of pericardial effusion.  No evidence of wall motion 

abnormality.  Grade 2 diastolic dysfunction.  Right ventricular systolic 

pressure of 47 consistent with a moderate pulmonary hypertension.  No evidence 

of vegetation





Hematologist followed.  Patient noted to have mild leukopenia with WBC 4.4.  


Hepatitis panel revealed evidence of hepatitis C.  HIV test was nonreactive.


Patient was counseled on abstinence from illicit street drugs.  


Patient require placement for wound care.


Placement was finally arranged to skilled nursing facility.


Patient was stable for transfer





FINAL DIAGNOSES: 


Sepsis


Left foot cellulitis


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


Left foot ulcer related to diabetes 


Electrolyte abnormalities:hyponatremia , hypokalemia  


Hypoalbuminemia


Hepatitis C


IV drug abuse


 





DISCHARGE MEDICATIONS:


See Medication Reconciliation list.





DISCHARGE INSTRUCTIONS:


Patient was discharged to the skilled nursing facility. 


Follow up with medical doctor at the facility.





I have been assigned to dictate discharge summary for this account. 


I was not involved in the patient's management.











Santa Beck NP Jul 31, 2019 08:09

## 2022-05-05 NOTE — NUR
Approved and I left a detailed message with the referral information HAND-OFF: 

Report given to IMER Madrigal. Pt satish.

## 2022-05-27 NOTE — NUR
Addended by: JH PINK on: 5/27/2022 10:40 AM     Modules accepted: Orders     NURSE NOTES:

Provided dressing change on the left leg as ordered due to previous dressing being soiled. 
Cleansed with NS and placed a xerofoam. Patient explained procedure before and during 
performance. Patient verbalized understanding. Patient tolerated the procedure well. Will 
continue to monitor.